# Patient Record
Sex: MALE | Race: WHITE | Employment: FULL TIME | URBAN - METROPOLITAN AREA
[De-identification: names, ages, dates, MRNs, and addresses within clinical notes are randomized per-mention and may not be internally consistent; named-entity substitution may affect disease eponyms.]

---

## 2019-11-25 ENCOUNTER — OFFICE VISIT (OUTPATIENT)
Dept: FAMILY MEDICINE | Facility: CLINIC | Age: 39
End: 2019-11-25
Payer: COMMERCIAL

## 2019-11-25 DIAGNOSIS — Z09 HOSPITAL DISCHARGE FOLLOW-UP: Primary | ICD-10-CM

## 2019-11-25 DIAGNOSIS — M25.522 LEFT ELBOW PAIN: ICD-10-CM

## 2019-11-25 DIAGNOSIS — E78.5 HYPERLIPIDEMIA LDL GOAL <100: ICD-10-CM

## 2019-11-25 DIAGNOSIS — L03.114 CELLULITIS OF LEFT UPPER EXTREMITY: ICD-10-CM

## 2019-11-25 DIAGNOSIS — E11.9 TYPE 2 DIABETES, HBA1C GOAL < 8% (H): ICD-10-CM

## 2019-11-25 PROCEDURE — 99203 OFFICE O/P NEW LOW 30 MIN: CPT | Performed by: FAMILY MEDICINE

## 2019-11-25 RX ORDER — SIMVASTATIN 10 MG
10 TABLET ORAL AT BEDTIME
Qty: 90 TABLET | Refills: 0 | Status: SHIPPED | OUTPATIENT
Start: 2019-11-25 | End: 2019-12-17

## 2019-11-25 RX ORDER — SYRINGE-NEEDLE,INSULIN,0.5 ML 27GX1/2"
SYRINGE, EMPTY DISPOSABLE MISCELLANEOUS
COMMUNITY
Start: 2019-11-20 | End: 2020-12-29

## 2019-11-25 RX ORDER — CEPHALEXIN 500 MG/1
1000 CAPSULE ORAL
COMMUNITY
Start: 2019-11-20 | End: 2019-12-17

## 2019-11-25 RX ORDER — LISINOPRIL 5 MG/1
5 TABLET ORAL DAILY
Qty: 90 TABLET | Refills: 1 | Status: SHIPPED | OUTPATIENT
Start: 2019-11-25 | End: 2019-12-17

## 2019-11-25 RX ORDER — FLUTICASONE PROPIONATE 50 MCG
1 SPRAY, SUSPENSION (ML) NASAL DAILY
COMMUNITY

## 2019-11-25 RX ORDER — INSULIN GLARGINE 100 [IU]/ML
INJECTION, SOLUTION SUBCUTANEOUS
COMMUNITY
Start: 2019-11-25 | End: 2019-12-17

## 2019-11-25 RX ORDER — INSULIN GLARGINE 100 [IU]/ML
30 INJECTION, SOLUTION SUBCUTANEOUS
COMMUNITY
Start: 2019-11-20 | End: 2019-11-25

## 2019-11-25 RX ORDER — LANCETS
EACH MISCELLANEOUS
COMMUNITY
Start: 2019-11-20 | End: 2019-12-17

## 2019-11-25 RX ORDER — BLOOD-GLUCOSE METER
EACH MISCELLANEOUS
COMMUNITY
Start: 2019-11-20 | End: 2022-07-19

## 2019-11-25 RX ORDER — INSULIN GLARGINE 100 [IU]/ML
INJECTION, SOLUTION SUBCUTANEOUS
COMMUNITY
Start: 2019-11-20 | End: 2019-11-25

## 2019-11-25 RX ORDER — FEXOFENADINE HCL 180 MG/1
180 TABLET ORAL DAILY
COMMUNITY

## 2019-11-25 RX ORDER — PEN NEEDLE, DIABETIC 32GX 5/32"
NEEDLE, DISPOSABLE MISCELLANEOUS
COMMUNITY
Start: 2019-11-20 | End: 2020-02-19

## 2019-11-25 RX ORDER — NAPROXEN 500 MG/1
500 TABLET ORAL 2 TIMES DAILY WITH MEALS
Qty: 60 TABLET | Refills: 0 | Status: SHIPPED | OUTPATIENT
Start: 2019-11-25 | End: 2020-03-16

## 2019-11-25 NOTE — PROGRESS NOTES
Subjective     Wilmer Rizvi is a 39 year old male who presents to clinic today for the following health issues:    HPI   Patient comes as a follow-up visit from the hospital.  He was admitted for left elbow cellulitis.  He was treated as in the hospital for 4 days.  He has been discharged on Keflex.  He reports the swelling the pain the redness has been a lot less.  He has no fever no chills.    Previous history diabetes patient was not taking medication.  During his hospital evaluation his hemoglobin A1c was at 14.6.  He been discharged home on metformin 500 mg twice daily, and and Basaglar 30 units at bedtime.  He reports blood sugar remained to be elevated between 180 and 200.    He is scheduled to follow-up with his primary care physician on December 17, 2019.    He denies any fever, has no chills.  He still have stiffness in his elbow.  He has been off work since that time    Hospital Follow-up Visit:    Hospital/Nursing Home/IP Rehab Facility: [facility]  Date of Admission: 11-  Date of Discharge: 11-  Reason(s) for Admission: Left hand infection            Problems taking medications regularly:  None       Medication changes since discharge: New medications on chart       Problems adhering to non-medication therapy:  None    Summary of hospitalization:  CareEverywhere information obtained and reviewed  Diagnostic Tests/Treatments reviewed.  Follow up needed: none  Other Healthcare Providers Involved in Patient s Care:         None  Update since discharge: improved.     Post Discharge Medication Reconciliation: discharge medications reconciled and changed, per note/orders (see AVS).  Plan of care communicated with patient     Coding guidelines for this visit:  Type of Medical   Decision Making Face-to-Face Visit       within 7 Days of discharge Face-to-Face Visit        within 14 days of discharge   Moderate Complexity [id number]   High Complexity [id number]          Current  "Outpatient Medications   Medication Sig Dispense Refill     aspirin 81 MG tablet Take 1 tablet (81 mg) by mouth daily 30 tablet 3     BD HERRERA U/F 32G X 4 MM insulin pen needle        blood glucose (NO BRAND SPECIFIED) test strip tid 100 strip 3     Blood Glucose Calibration (FREESTYLE CONTROL SOLUTION) LIQD 1 Bottle by Test Solution route as needed. 1 Bottle 0     blood glucose monitoring (ACCU-CHEK FASTCLIX) lancets        Blood Glucose Monitoring Suppl (ACCU-CHEK GUIDE) w/Device KIT        cephALEXin (KEFLEX) 500 MG capsule Take 1,000 mg by mouth       fexofenadine (ALLEGRA) 180 MG tablet Take 180 mg by mouth daily       fish oil-omega-3 fatty acids (FISH OIL) 1000 MG capsule Take 2 g by mouth daily Take one tablet twice daily       fluticasone (FLONASE) 50 MCG/ACT nasal spray Spray 1 spray into both nostrils daily       insulin glargine (BASAGLAR KWIKPEN) 100 UNIT/ML pen 30 units qhs       insulin syringe-needle U-100 (BD SAFETYGLIDE INSULIN SYRINGE) 29G X 1/2\" 0.5 ML miscellaneous For administering insulin at home.       lisinopril (PRINIVIL/ZESTRIL) 5 MG tablet Take 1 tablet (5 mg) by mouth daily 90 tablet 1     metFORMIN (GLUCOPHAGE) 1000 MG tablet Take 1 tablet (1,000 mg) by mouth 2 times daily (with meals) 180 tablet 3     naproxen (NAPROSYN) 500 MG tablet Take 1 tablet (500 mg) by mouth 2 times daily (with meals) 60 tablet 0     ORDER FOR DME Respironics REMSTAR 60 Series Auto CPAP 5-18cm H2O, Quattro Air medium FFM       ORDER FOR DME Lancets.  2 to 3 times daily and as needed 100 each 4     simvastatin (ZOCOR) 10 MG tablet Take 1 tablet (10 mg) by mouth At Bedtime 90 tablet 0     Allergies   Allergen Reactions     Amoxicillin Rash     Rash     Recent Labs   Lab Test 08/06/13  0939 07/19/13  1126 01/22/13  0831 01/22/13  0830 03/26/12  1025 03/12/12  0831 12/21/11  1508  12/16/11   A1C  --  7.0*  --  7.5* 7.2*  --  12.7*   < >  --    LDL  --   --  92  --   --  71 141*  --   --    HDL  --   --  25*  --   -- "  34* 25*  --   --    TRIG  --   --  159*  --   --  208* 258*  --   --    ALT  --   --   --   --   --  46  --   --  68   CR  --   --  0.90  --   --   --  0.94  --  0.90   GFRESTIMATED  --   --  >90  --   --   --  >90  --  > 60   GFRESTBLACK  --   --  >90  --   --   --  >90  --  > 60   POTASSIUM  --   --   --   --   --   --  3.8  --  3.8   TSH 1.97  --   --   --   --   --  3.36  --   --     < > = values in this interval not displayed.        Reviewed and updated as needed this visit by Provider         Review of Systems   ROS COMP: Constitutional, HEENT, cardiovascular, pulmonary, gi and gu systems are negative, except as otherwise noted.      Objective    There were no vitals taken for this visit.  There is no height or weight on file to calculate BMI.  Physical Exam   GENERAL: healthy, alert and no distress  NECK: no adenopathy, no asymmetry, masses, or scars and thyroid normal to palpation  RESP: lungs clear to auscultation - no rales, rhonchi or wheezes  CV: regular rate and rhythm, normal S1 S2, no S3 or S4, no murmur, click or rub, no peripheral edema and peripheral pulses strong  ABDOMEN: soft, nontender, no hepatosplenomegaly, no masses and bowel sounds normal  SKIN: left elbow redness, and mild tenderness  NEURO: Normal strength and tone, mentation intact and speech normal  Left elbow exam, he has minor tenderness, no discharges, minimal swelling, painful, stiffness with full extension flexion.    Diagnostic Test Results:  Labs reviewed in Epic  none         Assessment & Plan     1. Hospital discharge follow-up  2. Cellulitis of left upper extremity  Improving he is to continue with his current antibiotic Keflex 4 times a day.  In addition I given naproxen 500 mg twice daily with food for the next 2 weeks.  I did apply AC bandage.  In addition, he was advised to apply ice a few times a day and avoid overusing it.    In addition, he was given a note to remain off work until next Monday.    3. Type 2 diabetes,  HbA1C goal < 8% (H)  Remained to be elevated I increased his metformin to 1000 mg twice daily, continue with his current insulin.  Continue with Accu-Cheks 3 times a day.    He will follow-up with his primary care physician on December 17.    In addition, I added lisinopril.    - metFORMIN (GLUCOPHAGE) 1000 MG tablet; Take 1 tablet (1,000 mg) by mouth 2 times daily (with meals)  Dispense: 180 tablet; Refill: 3  - blood glucose (NO BRAND SPECIFIED) test strip; tid  Dispense: 100 strip; Refill: 3  - lisinopril (PRINIVIL/ZESTRIL) 5 MG tablet; Take 1 tablet (5 mg) by mouth daily  Dispense: 90 tablet; Refill: 1  - insulin glargine (BASAGLAR KWIKPEN) 100 UNIT/ML pen; 30 units qhs    4. Hyperlipidemia LDL goal <100  Started Simvastatin at 10 mg at bedtime.  - simvastatin (ZOCOR) 10 MG tablet; Take 1 tablet (10 mg) by mouth At Bedtime  Dispense: 90 tablet; Refill: 0    5. Left elbow pain    - naproxen (NAPROSYN) 500 MG tablet; Take 1 tablet (500 mg) by mouth 2 times daily (with meals)  Dispense: 60 tablet; Refill: 0       See Patient Instructions    Return in about 3 months (around 2/25/2020).    Lexis Olivo MD  [facility]

## 2019-11-25 NOTE — LETTER
November 25, 2019      Wilmer Rizvi  [address] [facility] [address]  [place] [address]        To Whom It May Concern:    Wilmer Rizvi  was seen on 11/25/2019.  Please excuse him  until 12/02/2019 due to illness.  Return to work on Monday, 12/02/2019        Sincerely,        Lexis Olivo MD

## 2019-11-25 NOTE — PATIENT INSTRUCTIONS
"  Patient Education   Diabetes ABCs  Work with Your Health Care Team to Manage Diabetes!     A1c (hemoglobin A1c test):  Check at least every 6 months.  Goal without diabetes: Less than 6%  Goal with diabetes: Less than 7%, but your doctor may have a different goal for you.  My Goal: ___________________   BG (blood glucose):  Goals without diabetes:  Before meals: 60 to 99 mg/dL  After meals (2 hours): under 140 mg/dL  Goals with diabetes:   Before meals: 80 to 130 mg/dL  After meals: (2 hours): under 180 mg/dL  My Goals:  Before meals: __________  After meals: ___________   Blood pressure:  Check at every doctor visit.   Goal: Less than 140/90    Cholesterol:   Check at least 1 time a year.  Goals for LDL (\"bad\" cholesterol):  With heart disease: Less than 70 mg/dL  Without heart disease: Less than 100 mg/dL   Eyes:   Have a dilated eye exam every year.   Teeth:   See your dentist twice a year. People with diabetes have a higher risk of gum disease.  Smoking:   If you smoke, it's important to quit. Make a plan with help from your health care team.  Weight:   Work towards a healthy weight with help from your diabetes educator or dietitian.  Kidneys:     Check your urine protein 1 time each year.    Protect your kidneys by keeping your blood pressure normal.  Feet:    Check your feet every day for signs of injury, dry skin, cracks, cuts, swelling, or blisters.    Ask your doctor to check your feet at every visit.    Wear shoes and socks that fit well.    Don't go barefoot.  Sex:   Talk about erectile dysfunction (ED) and female sexual dysfunction (FSD) with your doctor.  For informational purposes only. Not to replace the advice of your health care provider. Copyright   2018 5[employer]9. All rights reserved. Illustration ID [id number]   [id number]  [employer]. Clinically reviewed by [facility]. [employer] 726910 - 10/18.       Patient Education   Goals for Your Diabetes Care  A1c   Goal:  " Less than 7 percent--ask your doctor if this is right for you  Check it: Every 3 to 6 months  Why:  Shows how well your blood glucose was controlled over the past 3 months  Blood pressure   Goal: Under 140/90  Check it:  At every clinic check up for diabetes  Why:  May prevent stroke, heart disease and eye and kidney diseases  Cholesterol   Goal:  Discuss cholesterol management with your doctor, and consider taking a statin medicine  Check it:  Every year  Why:  Helps prevent heart attacks and stroke  Kidney test (urine microalbumin)  Goal:  Under 30  Do it:  Every year  Why:  Finds kidney problems before they get worse  Foot exam   Goal:  No cuts or sores, normal sensation  Do it: Remove shoes and socks at every visit; have a monofilament test every year  Why: Finds foot problems before they get worse  Eye exam   Goal:  No changes in your eyes  Do it: Every year  Why:  Finds eye problems before they get worse  Exercise  Goal:  150 minutes of aerobic exercises and 2 to 3 sessions of strength training  Do it: Every week  Why:  Helps manage diabetes and improve health  Aspirin  Goal:  If you are age 50 or older, ask your doctor if you should take aspirin  Take it: Every day, or as prescribed  Why: Lowers the risk of heart attack and stroke  Smoking and tobacco  Goal: No tobacco use  Why: Tobacco is a major risk for heart disease  Diabetes education  Goal: Learn how to manage your diabetes  Do it: At least once a year--ask your doctor for a referral  Why: The more you know, the better you can manage your diabetes  Your goals may be different from what you see here. Your care team will tell you what your goals should be.   For informational purposes only. Not to replace the advice of your health care provider.   Copyright   2009 [employer]. All rights reserved. [employer] [id number] - Rev 01/16.

## 2019-11-27 ENCOUNTER — MYC REFILL (OUTPATIENT)
Dept: FAMILY MEDICINE | Facility: CLINIC | Age: 39
End: 2019-11-27

## 2019-11-27 DIAGNOSIS — E11.9 TYPE 2 DIABETES, HBA1C GOAL < 8% (H): ICD-10-CM

## 2019-11-27 NOTE — TELEPHONE ENCOUNTER
Medication filled 11/25/19. Duplicate, refused. Patient notified via [email or web address] message.     Rakel Carr RN, BSN, PHN  [facility]: [place]

## 2019-12-17 ENCOUNTER — OFFICE VISIT (OUTPATIENT)
Dept: FAMILY MEDICINE | Facility: CLINIC | Age: 39
End: 2019-12-17
Payer: COMMERCIAL

## 2019-12-17 VITALS
BODY MASS INDEX: 31.38 KG/M2 | HEIGHT: 73 IN | DIASTOLIC BLOOD PRESSURE: 85 MMHG | SYSTOLIC BLOOD PRESSURE: 147 MMHG | HEART RATE: 101 BPM | TEMPERATURE: 98.4 F | WEIGHT: 236.8 LBS

## 2019-12-17 DIAGNOSIS — I10 HYPERTENSION GOAL BP (BLOOD PRESSURE) < 140/90: ICD-10-CM

## 2019-12-17 DIAGNOSIS — E78.5 HYPERLIPIDEMIA LDL GOAL <100: ICD-10-CM

## 2019-12-17 DIAGNOSIS — E11.9 TYPE 2 DIABETES, HBA1C GOAL < 8% (H): Primary | ICD-10-CM

## 2019-12-17 LAB — HBA1C MFR BLD: 10.5 % (ref 0–5.6)

## 2019-12-17 PROCEDURE — 83036 HEMOGLOBIN GLYCOSYLATED A1C: CPT | Performed by: FAMILY MEDICINE

## 2019-12-17 PROCEDURE — 99214 OFFICE O/P EST MOD 30 MIN: CPT | Performed by: FAMILY MEDICINE

## 2019-12-17 PROCEDURE — 82043 UR ALBUMIN QUANTITATIVE: CPT | Performed by: FAMILY MEDICINE

## 2019-12-17 PROCEDURE — 36415 COLL VENOUS BLD VENIPUNCTURE: CPT | Performed by: FAMILY MEDICINE

## 2019-12-17 RX ORDER — INSULIN GLARGINE 100 [IU]/ML
INJECTION, SOLUTION SUBCUTANEOUS
Qty: 45 ML | Refills: 1 | Status: SHIPPED | OUTPATIENT
Start: 2019-12-17 | End: 2020-03-16

## 2019-12-17 RX ORDER — LANCETS
EACH MISCELLANEOUS
Qty: 102 EACH | Refills: 3 | Status: SHIPPED | OUTPATIENT
Start: 2019-12-17 | End: 2022-07-19

## 2019-12-17 RX ORDER — LISINOPRIL 10 MG/1
10 TABLET ORAL DAILY
Qty: 30 TABLET | Refills: 1 | Status: SHIPPED | OUTPATIENT
Start: 2019-12-17 | End: 2020-03-16

## 2019-12-17 RX ORDER — SIMVASTATIN 10 MG
10 TABLET ORAL AT BEDTIME
Qty: 90 TABLET | Refills: 0 | Status: SHIPPED | OUTPATIENT
Start: 2019-12-17 | End: 2020-03-16

## 2019-12-17 ASSESSMENT — MIFFLIN-ST. JEOR: SCORE: 2041

## 2019-12-18 NOTE — PATIENT INSTRUCTIONS
BLOOD PRESSURE:  increase lisinopril from 5mg to 10mg    DIABETES:  Increase your lantus by 3 units every 3 days until your fasting blood sugar is less than 140.      Update me with blood sugar readings via [employer]    Return in 2 months      A1c Chart     6-----135 average blood sugar   7-----170  8-----205  9-----240  10----275  11----310  12----345

## 2019-12-18 NOTE — PROGRESS NOTES
Subjective     Wilmer Rizvi is a 39 year old male who presents to clinic today for the following health issues:    HPI   Diabetes Follow-up    How often are you checking your blood sugar? Four or more times daily  Blood sugar testing frequency justification:  Uncontrolled diabetes  What time of day are you checking your blood sugars (select all that apply)?  Before and after meals and At bedtime  Have you had any blood sugars above 200?  No  Have you had any blood sugars below 70?  No    What symptoms do you notice when your blood sugar is low?  None    What concerns do you have today about your diabetes? Other: Long term prognosis      Do you have any of these symptoms? (Select all that apply)  No numbness or tingling in feet.  No redness, sores or blisters on feet.  No complaints of excessive thirst.  No reports of blurry vision.  No significant changes to weight.     Have you had a diabetic eye exam in the last 12 months? Yes- Date of last eye exam: 04/2019 at [facility]    Patients presents with uncontrolled diabetes. For the past 6 years he has not managed his diabetes. Currently he is taking Metformin 1000mg BID and insulin glargine 30 units. He notes that his blood sugar spikes in the morning. His blood sugars are averaging 170-210. After being discharged from the hospital his blood sugars were in the 260s.    Diabetes Management Resources    Hyperlipidemia Follow-Up      Are you regularly taking any medication or supplement to lower your cholesterol?   Yes- simvastatin    Are you having muscle aches or other side effects that you think could be caused by your cholesterol lowering medication?  No    Hypertension Follow-up      Do you check your blood pressure regularly outside of the clinic? Yes     Are you following a low salt diet? Yes    Are your blood pressures ever more than 140 on the top number (systolic) OR more   than 90 on the bottom number (diastolic), for example 140/90? No  Managed by lisinopril 5mg.  "Patient reports his blood pressure readings are within normal limits at home.     BP Readings from Last 2 Encounters:   12/17/19 (!) 147/85   11/05/13 136/85     Hemoglobin A1C (%)   Date Value   12/17/2019 10.5 (H)   07/19/2013 7.0 (H)     LDL Cholesterol Calculated (mg/dL)   Date Value   01/22/2013 92   03/12/2012 71         How many servings of fruits and vegetables do you eat daily?  2-3    On average, how many sweetened beverages do you drink each day (Examples: soda, juice, sweet tea, etc.  Do NOT count diet or artificially sweetened beverages)?   0    How many days per week do you miss taking your medication? 0    BP Readings from Last 3 Encounters:   12/17/19 (!) 147/85   11/05/13 136/85   09/24/13 141/89    Wt Readings from Last 3 Encounters:   12/17/19 107.4 kg (236 lb 12.8 oz)   11/05/13 120.5 kg (265 lb 9.6 oz)   09/24/13 119.1 kg (262 lb 9.6 oz)        Reviewed and updated as needed this visit by Provider  Allergies  Meds  Problems  Med Hx  Surg Hx       Review of Systems   ROS COMP: Constitutional, HEENT, cardiovascular, pulmonary, gi and gu systems are negative, except as otherwise noted.    This document serves as a record of the services and decisions personally performed and made by Tammy Guevara DO. It was created on her behalf by Pham Hadley, a trained medical scribe. The creation of this document is based on the provider's statements to the medical scribe.  Pham Hadley 6:40 PM December 17, 2019      Objective    BP (!) 147/85 (BP Location: Left arm, Patient Position: Chair, Cuff Size: Adult Large)   Pulse 101   Temp 98.4  F (36.9  C) (Oral)   Ht 1.859 m (6' 1.19\")   Wt 107.4 kg (236 lb 12.8 oz)   BMI 31.08 kg/m    Body mass index is 31.08 kg/m .     Physical Exam   GENERAL: healthy, alert and no distress  HENT: mouth without ulcers or lesions  NECK: no adenopathy, no asymmetry, masses, or scars and thyroid normal to palpation  RESP: lungs clear to auscultation - no rales, " rhonchi or wheezes  CV: regular rate and rhythm, normal S1 S2, no S3 or S4, no murmur, click or rub, no peripheral edema and peripheral pulses strong  MS: no gross musculoskeletal defects noted, no edema  SKIN: no suspicious lesions or rashes to visible skin   NEURO: Normal strength and tone, mentation intact and speech normal  PSYCH: mentation appears normal, affect normal/bright        Assessment & Plan       ICD-10-CM    1. Type 2 diabetes, HbA1C goal < 8% (H) E11.9 HEMOGLOBIN A1C     Albumin Random Urine Quantitative with Creat Ratio     OPHTHALMOLOGY ADULT REFERRAL     insulin glargine (BASAGLAR KWIKPEN) 100 UNIT/ML pen     blood glucose monitoring (ACCU-CHEK FASTCLIX) lancets     blood glucose (NO BRAND SPECIFIED) test strip   2. Hyperlipidemia LDL goal <100 E78.5 simvastatin (ZOCOR) 10 MG tablet   3. Hypertension goal BP (blood pressure) < 140/90 I10 lisinopril (PRINIVIL/ZESTRIL) 10 MG tablet     Patient presents with uncontrolled diabetes. He has been instructed to continue his diabetes medication and increase his lantus by 3 units every 3 days until his fasting blood sugar is below 140. He should return in 2 months for a diabetes recheck.     Since patients blood pressure was elevated today I have increased his lisinopril from 5mg to 10mg. He should return in 2 months for a blood pressure recheck.     The current medical regimen for hyperlipidemia is effective;  continue present plan and medications.        Patient Instructions   BLOOD PRESSURE:  increase lisinopril from 5mg to 10mg    DIABETES:  Increase your lantus by 3 units every 3 days until your fasting blood sugar is less than 140.      Update me with blood sugar readings via [employer]    Return in 2 months      A1c Chart     6-----135 average blood sugar   7-----170  8-----205  9-----240  10----275  11----310  12----345         Return in about 2 months (around 2/17/2020) for BP Recheck, diabetes.     The information in this document, created by the  medical scribePham, for me, accurately reflects the services I personally performed and the decisions made by me. I have reviewed and approved this document for accuracy.  December 17, 2019 6:55 PM    Tammy Guevara DO  [facility]

## 2019-12-19 LAB
CREAT UR-MCNC: 91 MG/DL
MICROALBUMIN UR-MCNC: 44 MG/L
MICROALBUMIN/CREAT UR: 47.91 MG/G CR (ref 0–17)

## 2020-02-12 ENCOUNTER — MYC MEDICAL ADVICE (OUTPATIENT)
Dept: FAMILY MEDICINE | Facility: CLINIC | Age: 40
End: 2020-02-12

## 2020-02-12 NOTE — TELEPHONE ENCOUNTER
[employer] message sent to patient.  Dr. Guevara, please review his blood sugar results that he sent in his [employer] message.    Thank you,    Danilo Narvaez RN

## 2020-02-18 ENCOUNTER — TELEPHONE (OUTPATIENT)
Dept: FAMILY MEDICINE | Facility: CLINIC | Age: 40
End: 2020-02-18

## 2020-02-18 DIAGNOSIS — E11.9 TYPE 2 DIABETES, HBA1C GOAL < 8% (H): Primary | ICD-10-CM

## 2020-02-18 NOTE — TELEPHONE ENCOUNTER
"Requested Prescriptions   Pending Prescriptions Disp Refills     BD HERRERA U/F 32G X 4 MM insulin pen needle  Medication is historical  Last Written Prescription Date:  11/20/2019  Last Fill Quantity: ,  # refills:    Last office visit: 12/17/2019 with prescribing provider:  Ismael   Future Office Visit:   Next 5 appointments (look out 90 days)    Feb 24, 2020  8:40 AM CST  SHORT with Tammy Guevara DO  [facility] ([address] [id number] [phone]                Diabetic Supplies Protocol Passed - 2/18/2020  8:44 AM        Passed - Medication is active on med list        Passed - Patient is 18 years of age or older        Passed - Recent (6 mo) or future (30 days) visit within the authorizing provider's specialty     Patient had office visit in the last 6 months or has a visit in the next 30 days with authorizing provider.  See \"Patient Info\" tab in inbasket, or \"Choose Columns\" in Meds & Orders section of the refill encounter.             "

## 2020-02-18 NOTE — TELEPHONE ENCOUNTER
Left patient semi detailed message that alternative medication was requested by his insurance.  This medication has been sent to the pharmacy.  His Pharmacy will call when ready to .    Rylee Ramirez RN

## 2020-02-18 NOTE — TELEPHONE ENCOUNTER
Alternative requested: Re: insulin glargine (BASAGLAR KWIKPEN) 100 UNIT/ML pen    Pharmacy note: Alternative requested, please send new RX for Lantus

## 2020-02-18 NOTE — TELEPHONE ENCOUNTER
"Routing request for insulin alternative to providers in PCP absence.  New order for Lantus pended for approval.  Patient last saw PCP 12/17/19 for DMII-noted pasted below, and patient does have upcoming appt scheduled for 2/24/20.    Kenan James RN    \"Patient presents with uncontrolled diabetes. He has been instructed to continue his diabetes medication and increase his lantus by 3 units every 3 days until his fasting blood sugar is below 140. He should return in 2 months for a diabetes recheck.\"     "

## 2020-02-19 RX ORDER — PEN NEEDLE, DIABETIC 32GX 5/32"
NEEDLE, DISPOSABLE MISCELLANEOUS
Qty: 100 EACH | Refills: 3 | Status: SHIPPED | OUTPATIENT
Start: 2020-02-19 | End: 2020-12-29

## 2020-02-19 NOTE — TELEPHONE ENCOUNTER
Routing refill request to provider for review/approval because:  Medication is reported/historical    Rakel Carr RN, BSN, PHN  [facility] [place]

## 2020-03-02 ENCOUNTER — HEALTH MAINTENANCE LETTER (OUTPATIENT)
Age: 40
End: 2020-03-02

## 2020-03-16 ENCOUNTER — OFFICE VISIT (OUTPATIENT)
Dept: FAMILY MEDICINE | Facility: CLINIC | Age: 40
End: 2020-03-16
Payer: COMMERCIAL

## 2020-03-16 VITALS
TEMPERATURE: 97.8 F | SYSTOLIC BLOOD PRESSURE: 162 MMHG | WEIGHT: 240 LBS | DIASTOLIC BLOOD PRESSURE: 80 MMHG | HEART RATE: 103 BPM | OXYGEN SATURATION: 99 % | BODY MASS INDEX: 31.81 KG/M2 | HEIGHT: 73 IN

## 2020-03-16 DIAGNOSIS — E11.9 TYPE 2 DIABETES, HBA1C GOAL < 8% (H): Primary | ICD-10-CM

## 2020-03-16 DIAGNOSIS — I10 ESSENTIAL HYPERTENSION: ICD-10-CM

## 2020-03-16 DIAGNOSIS — E78.5 HYPERLIPIDEMIA LDL GOAL <100: ICD-10-CM

## 2020-03-16 DIAGNOSIS — Z11.4 SCREENING FOR HIV (HUMAN IMMUNODEFICIENCY VIRUS): ICD-10-CM

## 2020-03-16 LAB — HBA1C MFR BLD: 7.4 % (ref 0–5.6)

## 2020-03-16 PROCEDURE — 87389 HIV-1 AG W/HIV-1&-2 AB AG IA: CPT | Performed by: FAMILY MEDICINE

## 2020-03-16 PROCEDURE — 80061 LIPID PANEL: CPT | Performed by: FAMILY MEDICINE

## 2020-03-16 PROCEDURE — 36415 COLL VENOUS BLD VENIPUNCTURE: CPT | Performed by: FAMILY MEDICINE

## 2020-03-16 PROCEDURE — 90471 IMMUNIZATION ADMIN: CPT | Performed by: FAMILY MEDICINE

## 2020-03-16 PROCEDURE — 83036 HEMOGLOBIN GLYCOSYLATED A1C: CPT | Performed by: FAMILY MEDICINE

## 2020-03-16 PROCEDURE — 99214 OFFICE O/P EST MOD 30 MIN: CPT | Mod: 25 | Performed by: FAMILY MEDICINE

## 2020-03-16 PROCEDURE — 90732 PPSV23 VACC 2 YRS+ SUBQ/IM: CPT | Performed by: FAMILY MEDICINE

## 2020-03-16 PROCEDURE — 80048 BASIC METABOLIC PNL TOTAL CA: CPT | Performed by: FAMILY MEDICINE

## 2020-03-16 RX ORDER — INSULIN GLARGINE 100 [IU]/ML
50 INJECTION, SOLUTION SUBCUTANEOUS DAILY
Qty: 45 ML | Refills: 1 | Status: SHIPPED | OUTPATIENT
Start: 2020-03-16 | End: 2020-11-27

## 2020-03-16 RX ORDER — SIMVASTATIN 10 MG
10 TABLET ORAL AT BEDTIME
Qty: 90 TABLET | Refills: 3 | Status: SHIPPED | OUTPATIENT
Start: 2020-03-16 | End: 2021-05-18

## 2020-03-16 RX ORDER — AMLODIPINE BESYLATE 5 MG/1
5 TABLET ORAL DAILY
Qty: 30 TABLET | Refills: 1 | Status: SHIPPED | OUTPATIENT
Start: 2020-03-16 | End: 2020-04-05

## 2020-03-16 ASSESSMENT — MIFFLIN-ST. JEOR: SCORE: 2056.47

## 2020-03-16 NOTE — NURSING NOTE
Prior to immunization administration, verified patients identity using patient s name and date of birth. Please see Immunization Activity for additional information.     Screening Questionnaire for Adult Immunization    Are you sick today?   No   Do you have allergies to medications, food, a vaccine component or latex?   No   Have you ever had a serious reaction after receiving a vaccination?   No   Do you have a long-term health problem with heart, lung, kidney, or metabolic disease (e.g., diabetes), asthma, a blood disorder, no spleen, complement component deficiency, a cochlear implant, or a spinal fluid leak?  Are you on long-term aspirin therapy?   Yes   Do you have cancer, leukemia, HIV/AIDS, or any other immune system problem?   No   Do you have a parent, brother, or sister with an immune system problem?   No   In the past 3 months, have you taken medications that affect  your immune system, such as prednisone, other steroids, or anticancer drugs; drugs for the treatment of rheumatoid arthritis, Crohn s disease, or psoriasis; or have you had radiation treatments?   No   Have you had a seizure, or a brain or other nervous system problem?   No   During the past year, have you received a transfusion of blood or blood    products, or been given immune (gamma) globulin or antiviral drug?   No   For women: Are you pregnant or is there a chance you could become       pregnant during the next month?   No   Have you received any vaccinations in the past 4 weeks?   No     Immunization questionnaire was positive for at least one answer.  Notified Dr. Guevara.        Per orders of Dr. Guevara, injection of Pneumovax 23 given by Shaan Lindsay CMA. Patient instructed to remain in clinic for 15 minutes afterwards, and to report any adverse reaction to me immediately.       Screening performed by Shaan Lindsay CMA on 3/16/2020 at 10:03 AM.

## 2020-03-16 NOTE — PROGRESS NOTES
Subjective     Wilmer Rizvi is a 39 year old male who presents to clinic today for the following health issues:    HPI     Diabetes Follow-up    How often are you checking your blood sugar? Two times daily  Blood sugar testing frequency justification:  Patient modifying lifestyle changes (diet, exercise) with blood sugars  What time of day are you checking your blood sugars (select all that apply)?  Before and after meals  Have you had any blood sugars above 200?  Yes last 2 days  Have you had any blood sugars below 70?  No    What symptoms do you notice when your blood sugar is low?  None    What concerns do you have today about your diabetes? None     Do you have any of these symptoms? (Select all that apply)  No numbness or tingling in feet.  No redness, sores or blisters on feet.  No complaints of excessive thirst.  No reports of blurry vision.  No significant changes to weight.    Have you had a diabetic eye exam in the last 12 months? Has one scheduled for next week     Takes 50 units of Basaglar every night. Has found that blood sugars are controlled when he sticks to a specific time frame with eating and taking medication.     Hyperlipidemia Follow-Up      Are you regularly taking any medication or supplement to lower your cholesterol?   Yes- Simvastatin     Are you having muscle aches or other side effects that you think could be caused by your cholesterol lowering medication?  No     Forgets Simvastatin every once in a while.     Hypertension Follow-up      Do you check your blood pressure regularly outside of the clinic? No     Are you following a low salt diet? No    Are your blood pressures ever more than 140 on the top number (systolic) OR more   than 90 on the bottom number (diastolic), for example 140/90?     Believes high stress at work may have contributed to elevated blood pressure in clinic today. Has a blood pressure cuff at home but hasn't been using it for the last 1.5 months.     BP Readings  "from Last 2 Encounters:   03/16/20 (!) 162/80   12/17/19 (!) 147/85     Hemoglobin A1C (%)   Date Value   03/16/2020 7.4 (H)   12/17/2019 10.5 (H)     LDL Cholesterol Calculated (mg/dL)   Date Value   01/22/2013 92   03/12/2012 71       How many servings of fruits and vegetables do you eat daily?  2-3    On average, how many sweetened beverages do you drink each day (Examples: soda, juice, sweet tea, etc.  Do NOT count diet or artificially sweetened beverages)?   0    How many days per week do you exercise enough to make your heart beat faster? 7    How many minutes a day do you exercise enough to make your heart beat faster? 60 or more    How many days per week do you miss taking your medication? 0    BP Readings from Last 3 Encounters:   03/16/20 (!) 162/80   12/17/19 (!) 147/85   11/05/13 136/85    Wt Readings from Last 3 Encounters:   03/16/20 108.9 kg (240 lb)   12/17/19 107.4 kg (236 lb 12.8 oz)   11/05/13 120.5 kg (265 lb 9.6 oz)         Reviewed and updated as needed this visit by Provider  Allergies  Meds  Problems  Med Hx  Surg Hx       Review of Systems   ROS COMP: Constitutional, HEENT, cardiovascular, pulmonary, gi and gu systems are negative, except as otherwise noted.    This document serves as a record of the services and decisions personally performed and made by Tammy Guevara DO. It was created on her behalf by Teri Salamanca, a trained medical scribe. The creation of this document is based on the provider's statements to the medical scribe.  Teri Salamanca 9:46 AM March 16, 2020      Objective    BP (!) 162/80   Pulse 103   Temp 97.8  F (36.6  C) (Oral)   Ht 1.861 m (6' 1.25\")   Wt 108.9 kg (240 lb)   SpO2 99%   BMI 31.45 kg/m    Body mass index is 31.45 kg/m .     Physical Exam   GENERAL: healthy, alert and no distress  HENT: ear canals and TM's normal, nose and mouth without ulcers or lesions  NECK: no adenopathy, no asymmetry, masses, or scars and thyroid normal to palpation  RESP: lungs " clear to auscultation - no rales, rhonchi or wheezes  CV: regular rate and rhythm, normal S1 S2, no S3 or S4, no murmur, click or rub, no peripheral edema  PSYCH: mentation appears normal, affect normal/bright    Diagnostic Test Results:  Labs reviewed in Epic  Results for orders placed or performed in visit on 03/16/20 (from the past 24 hour(s))   Hemoglobin A1c   Result Value Ref Range    Hemoglobin A1C 7.4 (H) 0 - 5.6 %           Assessment & Plan       ICD-10-CM    1. Type 2 diabetes, HbA1C goal < 8% (H)  E11.9 BASIC METABOLIC PANEL     Hemoglobin A1c     insulin glargine (BASAGLAR KWIKPEN) 100 UNIT/ML pen   2. Hyperlipidemia LDL goal <100  E78.5 Lipid panel reflex to direct LDL Fasting     simvastatin (ZOCOR) 10 MG tablet   3. Essential hypertension  I10 BASIC METABOLIC PANEL     amLODIPine (NORVASC) 5 MG tablet   4. Screening for HIV (human immunodeficiency virus)  Z11.4 HIV Screening   Diabetes is well controlled, continue current medication. Patient will start amlodipine 5 mg today for hypertension and stop lisinopril. He will start monitoring blood pressures at home and increase amlodipine to 10 mg if not well controlled.         Return in about 3 months (around 6/16/2020) for Lab Work, Routine Visit.    The information in this document, created by the medical scribe for me, accurately reflects the services I personally performed and the decisions made by me. I have reviewed and approved this document for accuracy prior to leaving the patient care area.  March 16, 2020 9:55 AM    Tammy Guevara DO  [facility]

## 2020-03-17 LAB
ANION GAP SERPL CALCULATED.3IONS-SCNC: 11 MMOL/L (ref 3–14)
BUN SERPL-MCNC: 18 MG/DL (ref 7–30)
CALCIUM SERPL-MCNC: 9.1 MG/DL (ref 8.5–10.1)
CHLORIDE SERPL-SCNC: 105 MMOL/L (ref 94–109)
CHOLEST SERPL-MCNC: 200 MG/DL
CO2 SERPL-SCNC: 22 MMOL/L (ref 20–32)
CREAT SERPL-MCNC: 0.86 MG/DL (ref 0.66–1.25)
GFR SERPL CREATININE-BSD FRML MDRD: >90 ML/MIN/{1.73_M2}
GLUCOSE SERPL-MCNC: 248 MG/DL (ref 70–99)
HDLC SERPL-MCNC: 33 MG/DL
HIV 1+2 AB+HIV1 P24 AG SERPL QL IA: NONREACTIVE
LDLC SERPL CALC-MCNC: 123 MG/DL
NONHDLC SERPL-MCNC: 167 MG/DL
POTASSIUM SERPL-SCNC: 3.8 MMOL/L (ref 3.4–5.3)
SODIUM SERPL-SCNC: 138 MMOL/L (ref 133–144)
TRIGL SERPL-MCNC: 218 MG/DL

## 2020-04-01 ENCOUNTER — MYC MEDICAL ADVICE (OUTPATIENT)
Dept: FAMILY MEDICINE | Facility: CLINIC | Age: 40
End: 2020-04-01

## 2020-04-01 DIAGNOSIS — I10 ESSENTIAL HYPERTENSION: ICD-10-CM

## 2020-04-05 RX ORDER — AMLODIPINE BESYLATE 5 MG/1
5 TABLET ORAL DAILY
Qty: 90 TABLET | Refills: 1 | Status: SHIPPED | OUTPATIENT
Start: 2020-04-05 | End: 2020-11-10

## 2020-07-06 ENCOUNTER — MYC MEDICAL ADVICE (OUTPATIENT)
Dept: FAMILY MEDICINE | Facility: CLINIC | Age: 40
End: 2020-07-06

## 2020-07-07 NOTE — TELEPHONE ENCOUNTER
Forms received from Insulin Treated Diabetes Mellitus Report for Tammy Guevara DO.  Forms placed in provider 'sign me' folder.  Please mail forms to  and [employer] after completion.    Yandel Aldridge

## 2020-07-07 NOTE — TELEPHONE ENCOUNTER
Patient attached a picture of his form, which does not print well. [employer] message sent asking patient if he is able to re-send this form.    Yandel Aldridge

## 2020-07-10 NOTE — TELEPHONE ENCOUNTER
Form received back, provider is requesting an appointment to follow up on patient's blood pressure before she feels comfortable clearing patient to drive.    Left message on machine, and mychart message sent requesting for patient to set up an office visit.    Form placed in TC holding forms folder by Angela's work station.    Yandel Aldridge

## 2020-07-13 ENCOUNTER — TELEPHONE (OUTPATIENT)
Dept: FAMILY MEDICINE | Facility: CLINIC | Age: 40
End: 2020-07-13

## 2020-07-13 NOTE — TELEPHONE ENCOUNTER
Reason for Call:  Other     Detailed comments: Patient needs to schedule sooner then August to complete paperwork.    Phone Number Patient can be reached at: Home number on file [phone] (home)    Best Time:     Can we leave a detailed message on this number? YES    Call taken on 7/13/2020 at 12:56 PM by Mary Ann Mohamud

## 2020-07-14 NOTE — TELEPHONE ENCOUNTER
Patient has office visit coming up on 07/27 with PCP regarding his From's for the DMV. He is wondering if a virtual visit would be okay regarding this. Sending to provider to Advise.  He did Fax and Mychart form's to us.

## 2020-07-14 NOTE — TELEPHONE ENCOUNTER
He needs to be seen in the clinic okay to add him onto my day on Thursday may be at the end of the day or if we have MA coverage    Tammy Guevara D.O.

## 2020-07-16 ENCOUNTER — OFFICE VISIT (OUTPATIENT)
Dept: FAMILY MEDICINE | Facility: CLINIC | Age: 40
End: 2020-07-16
Payer: COMMERCIAL

## 2020-07-16 VITALS — RESPIRATION RATE: 20 BRPM | SYSTOLIC BLOOD PRESSURE: 151 MMHG | DIASTOLIC BLOOD PRESSURE: 81 MMHG | HEART RATE: 79 BPM

## 2020-07-16 DIAGNOSIS — I10 HYPERTENSION GOAL BP (BLOOD PRESSURE) < 140/90: ICD-10-CM

## 2020-07-16 DIAGNOSIS — E11.9 TYPE 2 DIABETES, HBA1C GOAL < 8% (H): Primary | ICD-10-CM

## 2020-07-16 DIAGNOSIS — N52.9 ERECTILE DYSFUNCTION, UNSPECIFIED ERECTILE DYSFUNCTION TYPE: ICD-10-CM

## 2020-07-16 LAB
ANION GAP SERPL CALCULATED.3IONS-SCNC: 8 MMOL/L (ref 3–14)
BUN SERPL-MCNC: 14 MG/DL (ref 7–30)
CALCIUM SERPL-MCNC: 9.6 MG/DL (ref 8.5–10.1)
CHLORIDE SERPL-SCNC: 107 MMOL/L (ref 94–109)
CO2 SERPL-SCNC: 24 MMOL/L (ref 20–32)
CREAT SERPL-MCNC: 0.92 MG/DL (ref 0.66–1.25)
GFR SERPL CREATININE-BSD FRML MDRD: >90 ML/MIN/{1.73_M2}
GLUCOSE SERPL-MCNC: 229 MG/DL (ref 70–99)
HBA1C MFR BLD: 8.9 % (ref 0–5.6)
POTASSIUM SERPL-SCNC: 4.6 MMOL/L (ref 3.4–5.3)
SODIUM SERPL-SCNC: 139 MMOL/L (ref 133–144)

## 2020-07-16 PROCEDURE — 99214 OFFICE O/P EST MOD 30 MIN: CPT | Performed by: FAMILY MEDICINE

## 2020-07-16 PROCEDURE — 83036 HEMOGLOBIN GLYCOSYLATED A1C: CPT | Performed by: FAMILY MEDICINE

## 2020-07-16 PROCEDURE — 36415 COLL VENOUS BLD VENIPUNCTURE: CPT | Performed by: FAMILY MEDICINE

## 2020-07-16 PROCEDURE — 80048 BASIC METABOLIC PNL TOTAL CA: CPT | Performed by: FAMILY MEDICINE

## 2020-07-16 RX ORDER — AMLODIPINE BESYLATE 10 MG/1
10 TABLET ORAL DAILY
Qty: 30 TABLET | Refills: 1 | Status: SHIPPED | OUTPATIENT
Start: 2020-07-16 | End: 2020-10-16

## 2020-07-16 RX ORDER — SILDENAFIL 100 MG/1
50-100 TABLET, FILM COATED ORAL DAILY PRN
Qty: 10 TABLET | Refills: 1 | Status: SHIPPED | OUTPATIENT
Start: 2020-07-16 | End: 2022-03-04

## 2020-07-16 NOTE — PROGRESS NOTES
Subjective     Wilmer Rizvi is a 40 year old male who presents to clinic today for the following health issues:    HPI       For for DMV regarding DM,       Hypertension Follow-up      Do you check your blood pressure regularly outside of the clinic? Yes     Are you following a low salt diet? No    Are your blood pressures ever more than 140 on the top number (systolic) OR more   than 90 on the bottom number (diastolic), for example 140/90? Yes   120-130/70-80 at home.  He is checking daily or a few times a week    He is doing well with his norvasc       And Other Concerns   163/83  Diabetes Follow-up    How often are you checking your blood sugar? Three times daily  Blood sugar testing frequency justification:  Uncontrolled diabetes  What time of day are you checking your blood sugars (select all that apply)?  Before and after meals, fasting 156, 161, 200  Have you had any blood sugars above 200?  No, only 1   Have you had any blood sugars below 70?  No    What symptoms do you notice when your blood sugar is low?  None    What concerns do you have today about your diabetes? None     Do you have any of these symptoms? (Select all that apply)  No numbness or tingling in feet.  No redness, sores or blisters on feet.  No complaints of excessive thirst.  No reports of blurry vision.  No significant changes to weight.    Have you had a diabetic eye exam in the last 12 months? No, was cancelled   Lab Results   Component Value Date    A1C 8.9 07/16/2020    A1C 7.4 03/16/2020    A1C 10.5 12/17/2019    A1C 7.0 07/19/2013    A1C 7.5 01/22/2013         Metformin 1000 mg twice daily, Basaglar 50 units daily       BP Readings from Last 2 Encounters:   07/16/20 (!) 151/81   03/16/20 (!) 162/80     Hemoglobin A1C (%)   Date Value   07/16/2020 8.9 (H)   03/16/2020 7.4 (H)     LDL Cholesterol Calculated (mg/dL)   Date Value   03/16/2020 123 (H)   01/22/2013 92                 BP Readings from Last 3 Encounters:   07/16/20 (!) 151/81    03/16/20 (!) 162/80   12/17/19 (!) 147/85    Wt Readings from Last 3 Encounters:   03/16/20 108.9 kg (240 lb)   12/17/19 107.4 kg (236 lb 12.8 oz)   11/05/13 120.5 kg (265 lb 9.6 oz)           Reviewed and updated as needed this visit by Provider  Tobacco  Allergies  Meds  Problems  Med Hx  Surg Hx  Fam Hx         Review of Systems   Constitutional, HEENT, cardiovascular, pulmonary, GI, , musculoskeletal, neuro, skin, endocrine and psych systems are negative, except as otherwise noted.      Objective    BP (!) 151/81   Pulse 79   Resp 20   There is no height or weight on file to calculate BMI.  Physical Exam   GENERAL: healthy, alert and no distress  NECK: no adenopathy, no asymmetry, masses, or scars and thyroid normal to palpation  RESP: lungs clear to auscultation - no rales, rhonchi or wheezes  CV: regular rate and rhythm, normal S1 S2, no S3 or S4, no murmur, click or rub, no peripheral edema and peripheral pulses strong  MS: no gross musculoskeletal defects noted, no edema  SKIN: no suspicious lesions or rashes  PSYCH: mentation appears normal, affect normal/bright    Diagnostic Test Results:  Labs reviewed in Epic  Results for orders placed or performed in visit on 07/16/20 (from the past 24 hour(s))   Hemoglobin A1c   Result Value Ref Range    Hemoglobin A1C 8.9 (H) 0 - 5.6 %           Assessment & Plan     (E11.9) Type 2 diabetes, HbA1C goal < 8% (H)  (primary encounter diagnosis)  Comment: The patient does not want to adjust his medication at this time.  He feels his elevated A1c is due to poor food choices during the early days of the pandemic.  Plan: Hemoglobin A1c            (I10) Hypertension goal BP (blood pressure) < 140/90  Comment: We will increase his Norvasc from 5 mg daily to 10 mg daily and recheck in 2 months  Plan: Basic metabolic panel  (Ca, Cl, CO2, Creat,         Gluc, K, Na, BUN), amLODIPine (NORVASC) 10 MG         tablet            (N52.9) Erectile dysfunction, unspecified  "erectile dysfunction type  Comment:   Plan: sildenafil (VIAGRA) 100 MG tablet               BMI:   Estimated body mass index is 31.45 kg/m  as calculated from the following:    Height as of 3/16/20: 1.861 m (6' 1.25\").    Weight as of 3/16/20: 108.9 kg (240 lb).       Return in about 3 months (around 10/16/2020) for BP Recheck, diabetes.    Tammy Guevara DO  [facility]  "

## 2020-07-16 NOTE — PATIENT INSTRUCTIONS
Lab Results   Component Value Date    A1C 8.9 07/16/2020    A1C 7.4 03/16/2020    A1C 10.5 12/17/2019    A1C 7.0 07/19/2013    A1C 7.5 01/22/2013

## 2020-10-15 DIAGNOSIS — I10 HYPERTENSION GOAL BP (BLOOD PRESSURE) < 140/90: ICD-10-CM

## 2020-10-16 RX ORDER — AMLODIPINE BESYLATE 10 MG/1
10 TABLET ORAL DAILY
Qty: 30 TABLET | Refills: 0 | Status: SHIPPED | OUTPATIENT
Start: 2020-10-16 | End: 2020-11-10

## 2020-10-16 NOTE — TELEPHONE ENCOUNTER
Routing refill request to provider for review/approval because:  Labs out of range:  bp  BP Readings from Last 3 Encounters:   07/16/20 (!) 151/81   03/16/20 (!) 162/80   12/17/19 (!) 147/85     Jackson Garcia RN, BSN

## 2020-11-10 DIAGNOSIS — I10 HYPERTENSION GOAL BP (BLOOD PRESSURE) < 140/90: ICD-10-CM

## 2020-11-10 RX ORDER — AMLODIPINE BESYLATE 10 MG/1
TABLET ORAL
Qty: 30 TABLET | Refills: 0 | Status: SHIPPED | OUTPATIENT
Start: 2020-11-10 | End: 2020-11-27

## 2020-11-10 NOTE — TELEPHONE ENCOUNTER
I have called in a 30-day supply of this medication.  Trying contact this patient and find out if he is taking his Basaglar or Metformin.  It looks like he is due for refills on these also.  He was supposed to follow-up last month for diabetes recheck    Tammy Guevara DO

## 2020-11-27 ENCOUNTER — OFFICE VISIT (OUTPATIENT)
Dept: FAMILY MEDICINE | Facility: CLINIC | Age: 40
End: 2020-11-27
Payer: COMMERCIAL

## 2020-11-27 VITALS
HEIGHT: 73 IN | HEART RATE: 96 BPM | WEIGHT: 262.4 LBS | DIASTOLIC BLOOD PRESSURE: 88 MMHG | TEMPERATURE: 98 F | SYSTOLIC BLOOD PRESSURE: 142 MMHG | BODY MASS INDEX: 34.78 KG/M2 | OXYGEN SATURATION: 96 %

## 2020-11-27 DIAGNOSIS — E11.9 TYPE 2 DIABETES, HBA1C GOAL < 8% (H): Primary | ICD-10-CM

## 2020-11-27 DIAGNOSIS — I10 HYPERTENSION GOAL BP (BLOOD PRESSURE) < 140/90: ICD-10-CM

## 2020-11-27 DIAGNOSIS — E78.5 HYPERLIPIDEMIA LDL GOAL <100: ICD-10-CM

## 2020-11-27 LAB — HBA1C MFR BLD: 9.9 % (ref 0–5.6)

## 2020-11-27 PROCEDURE — 90686 IIV4 VACC NO PRSV 0.5 ML IM: CPT | Performed by: FAMILY MEDICINE

## 2020-11-27 PROCEDURE — 99214 OFFICE O/P EST MOD 30 MIN: CPT | Mod: 25 | Performed by: FAMILY MEDICINE

## 2020-11-27 PROCEDURE — 90471 IMMUNIZATION ADMIN: CPT | Performed by: FAMILY MEDICINE

## 2020-11-27 PROCEDURE — 82043 UR ALBUMIN QUANTITATIVE: CPT | Performed by: FAMILY MEDICINE

## 2020-11-27 PROCEDURE — 36415 COLL VENOUS BLD VENIPUNCTURE: CPT | Performed by: FAMILY MEDICINE

## 2020-11-27 PROCEDURE — 83036 HEMOGLOBIN GLYCOSYLATED A1C: CPT | Performed by: FAMILY MEDICINE

## 2020-11-27 RX ORDER — AMLODIPINE BESYLATE 10 MG/1
10 TABLET ORAL DAILY
Qty: 90 TABLET | Refills: 1 | Status: SHIPPED | OUTPATIENT
Start: 2020-11-27 | End: 2021-05-11

## 2020-11-27 RX ORDER — INSULIN GLARGINE 100 [IU]/ML
70 INJECTION, SOLUTION SUBCUTANEOUS DAILY
Qty: 30 ML | Refills: 1 | Status: SHIPPED | OUTPATIENT
Start: 2020-11-27 | End: 2021-02-24

## 2020-11-27 RX ORDER — LIRAGLUTIDE 6 MG/ML
1.2 INJECTION SUBCUTANEOUS DAILY
Qty: 6 ML | Refills: 2 | Status: SHIPPED | OUTPATIENT
Start: 2020-11-27 | End: 2021-02-16

## 2020-11-27 ASSESSMENT — MIFFLIN-ST. JEOR: SCORE: 2153.08

## 2020-11-27 NOTE — PROGRESS NOTES
Subjective     Wilmer Rizvi is a 40 year old male who presents to clinic today for the following health issues:    HPI         Diabetes Follow-up      How often are you checking your blood sugar? Not at all, patient's meter has been broken for a few weeks, normally once or twice a day    What concerns do you have today about your diabetes? None     Do you have any of these symptoms? (Select all that apply)  No numbness or tingling in feet.  No redness, sores or blisters on feet.  No complaints of excessive thirst.  No reports of blurry vision.  No significant changes to weight.    Have you had a diabetic eye exam in the last 12 months? Yes- Date of last eye exam: 10/01/2020,  Location: [facility]     basaglar 68-70 units at Hs and metformin 1000 mg bid     Lab Results   Component Value Date    A1C 9.9 11/27/2020    A1C 8.9 07/16/2020    A1C 7.4 03/16/2020    A1C 10.5 12/17/2019    A1C 7.0 07/19/2013               Hyperlipidemia Follow-Up      Are you regularly taking any medication or supplement to lower your cholesterol?   Yes- Simvastatin    Are you having muscle aches or other side effects that you think could be caused by your cholesterol lowering medication?  No   LDL Cholesterol Calculated   Date Value Ref Range Status   03/16/2020 123 (H) <100 mg/dL Final     Comment:     Above desirable:  100-129 mg/dl  Borderline High:  130-159 mg/dL  High:             160-189 mg/dL  Very high:       >189 mg/dl             Hypertension Follow-up      Do you check your blood pressure regularly outside of the clinic? Yes     Are you following a low salt diet? No     Are your blood pressures ever more than 140 on the top number (systolic) OR more   than 90 on the bottom number (diastolic), for example 140/90? No 127/68 at home     BP Readings from Last 2 Encounters:   11/27/20 (!) 142/88   07/16/20 (!) 151/81     Hemoglobin A1C (%)   Date Value   11/27/2020 9.9 (H)   07/16/2020 8.9 (H)     LDL Cholesterol Calculated  "(mg/dL)   Date Value   03/16/2020 123 (H)   01/22/2013 92         How many servings of fruits and vegetables do you eat daily?  2-3    On average, how many sweetened beverages do you drink each day (Examples: soda, juice, sweet tea, etc.  Do NOT count diet or artificially sweetened beverages)?   0    How many days per week do you exercise enough to make your heart beat faster? 3 or less    How many minutes a day do you exercise enough to make your heart beat faster? 30 - 60  How many days per week do you miss taking your medication? 1    What makes it hard for you to take your medications?  forgets to take insulin at night, sometimes wakes up at night will take it      Review of Systems   Constitutional, HEENT, cardiovascular, pulmonary, GI, , musculoskeletal, neuro, skin, endocrine and psych systems are negative, except as otherwise noted.      Objective    BP (!) 142/88 (BP Location: Right arm, Patient Position: Chair, Cuff Size: Adult Large)   Pulse 96   Temp 98  F (36.7  C) (Tympanic)   Ht 1.861 m (6' 1.25\")   Wt 119 kg (262 lb 6.4 oz)   SpO2 96%   BMI 34.38 kg/m    Body mass index is 34.38 kg/m .  Physical Exam   GENERAL: healthy, alert and no distress  NECK: no adenopathy, no asymmetry, masses, or scars and thyroid normal to palpation  RESP: lungs clear to auscultation - no rales, rhonchi or wheezes  CV: regular rate and rhythm, normal S1 S2, no S3 or S4, no murmur, click or rub, no peripheral edema and peripheral pulses strong  MS: no gross musculoskeletal defects noted, no edema  PSYCH: mentation appears normal, affect normal/bright    Results for orders placed or performed in visit on 11/27/20 (from the past 24 hour(s))   Hemoglobin A1c   Result Value Ref Range    Hemoglobin A1C 9.9 (H) 0 - 5.6 %           Assessment & Plan     Wilmer was seen today for chronic disease management.    Diagnoses and all orders for this visit:    Type 2 diabetes, HbA1C goal < 8% (H)  -     Albumin Random Urine " Quantitative with Creat Ratio  -     Hemoglobin A1c  -     liraglutide (VICTOZA) 18 MG/3ML solution; Inject 1.2 mg Subcutaneous daily  -     insulin glargine (BASAGLAR KWIKPEN) 100 UNIT/ML pen; Inject 70 Units Subcutaneous daily  -     metFORMIN (GLUCOPHAGE) 1000 MG tablet; Take 1 tablet (1,000 mg) by mouth 2 times daily (with meals)  -     blood glucose monitoring (NO BRAND SPECIFIED) meter device kit; Use to test blood sugar 2 times daily or as directed.    Hypertension goal BP (blood pressure) < 140/90  -     amLODIPine (NORVASC) 10 MG tablet; Take 1 tablet (10 mg) by mouth daily    Hyperlipidemia LDL goal <100    Other orders  -     HC FLU VAC PRESRV FREE QUAD SPLIT VIR > 6 MONTHS IM ([id number])              Return in about 3 months (around 2/27/2021) for diabetes, BP Recheck.    Tammy Guevara [facility]

## 2020-12-01 LAB
CREAT UR-MCNC: 136 MG/DL
MICROALBUMIN UR-MCNC: 23 MG/L
MICROALBUMIN/CREAT UR: 17.06 MG/G CR (ref 0–17)

## 2020-12-19 ENCOUNTER — OFFICE VISIT (OUTPATIENT)
Dept: URGENT CARE | Facility: URGENT CARE | Age: 40
End: 2020-12-19
Payer: COMMERCIAL

## 2020-12-19 VITALS
SYSTOLIC BLOOD PRESSURE: 156 MMHG | WEIGHT: 261.38 LBS | TEMPERATURE: 97.5 F | RESPIRATION RATE: 12 BRPM | BODY MASS INDEX: 34.25 KG/M2 | HEART RATE: 89 BPM | DIASTOLIC BLOOD PRESSURE: 94 MMHG | OXYGEN SATURATION: 98 %

## 2020-12-19 DIAGNOSIS — Z51.89 VISIT FOR WOUND CARE: ICD-10-CM

## 2020-12-19 DIAGNOSIS — K61.1 PERIRECTAL ABSCESS: Primary | ICD-10-CM

## 2020-12-19 PROCEDURE — 99213 OFFICE O/P EST LOW 20 MIN: CPT | Performed by: PHYSICIAN ASSISTANT

## 2020-12-19 RX ORDER — DOCUSATE SODIUM 100 MG/1
100 CAPSULE, LIQUID FILLED ORAL
COMMUNITY
Start: 2020-12-16 | End: 2020-12-30

## 2020-12-19 RX ORDER — SULFAMETHOXAZOLE/TRIMETHOPRIM 800-160 MG
1 TABLET ORAL
COMMUNITY
Start: 2020-12-16 | End: 2020-12-26

## 2020-12-19 RX ORDER — CEPHALEXIN 500 MG/1
500 CAPSULE ORAL
COMMUNITY
Start: 2020-12-16 | End: 2020-12-26

## 2020-12-19 ASSESSMENT — ENCOUNTER SYMPTOMS
COLOR CHANGE: 1
WOUND: 1
WHEEZING: 0
PALPITATIONS: 0
RHINORRHEA: 0
FEVER: 0
SHORTNESS OF BREATH: 0
FATIGUE: 0
COUGH: 0
CARDIOVASCULAR NEGATIVE: 1
CHILLS: 0
RESPIRATORY NEGATIVE: 1
CHEST TIGHTNESS: 0
SORE THROAT: 0

## 2020-12-19 NOTE — PROGRESS NOTES
"Subjective   Wilmer Rizvi is a 40 year old male who presents to clinic today for the following health issues:  HPI   Wound care  Onset: 5days ago  Description:  Developed a perirectal abscess 5days ago and this was I&D in the ER with good relief.  The wound was dressed with packing strips and he was started on keflex and bactrim DS with good improvement.  Was told to have this rechecked in 3days.  Intensity: moderate  Progression of Symptoms:  improving  Accompanying Signs & Symptoms:  Continues to have moderate pain and mild drainage but no fevers.   No abdominal pain, n/v, constipation, diarrhea, bloody or black tarry stools.     Previous history of similar problem: see above  Precipitating factors:        Worsened by: none  Alleviating factors:        Improved by: none  Therapies tried and outcome: see above    Patient Active Problem List   Diagnosis     Type 2 diabetes, HbA1C goal < 8% (H)     Hyperlipidemia LDL goal <100     Hypertension goal BP (blood pressure) < 140/90     Environmental allergies     Current Outpatient Medications   Medication     amLODIPine (NORVASC) 10 MG tablet     BD HERRERA U/F 32G X 4 MM XX insulin pen needle     blood glucose (NO BRAND SPECIFIED) test strip     blood glucose monitoring (ACCU-CHEK FASTCLIX) lancets     blood glucose monitoring (NO BRAND SPECIFIED) meter device kit     Blood Glucose Monitoring Suppl (ACCU-CHEK GUIDE) w/Device KIT     fexofenadine (ALLEGRA) 180 MG tablet     fish oil-omega-3 fatty acids (FISH OIL) 1000 MG capsule     fluticasone (FLONASE) 50 MCG/ACT nasal spray     insulin glargine (BASAGLAR KWIKPEN) 100 UNIT/ML pen     insulin syringe-needle U-100 (BD SAFETYGLIDE INSULIN SYRINGE) 29G X 1/2\" 0.5 ML miscellaneous     liraglutide (VICTOZA) 18 MG/3ML solution     metFORMIN (GLUCOPHAGE) 1000 MG tablet     sildenafil (VIAGRA) 100 MG tablet     simvastatin (ZOCOR) 10 MG tablet     No current facility-administered medications for this visit.         Allergies "   Allergen Reactions     Amoxicillin Rash     Rash       Review of Systems   Constitutional: Negative for chills, fatigue and fever.   HENT: Negative.  Negative for congestion, ear pain, rhinorrhea and sore throat.    Respiratory: Negative.  Negative for cough, chest tightness, shortness of breath and wheezing.    Cardiovascular: Negative.  Negative for chest pain, palpitations and peripheral edema.   Skin: Positive for color change and wound. Negative for pallor and rash.   All other systems reviewed and are negative.           Objective    BP (!) 156/94 (BP Location: Left arm, Patient Position: Chair, Cuff Size: Adult Large)   Pulse 89   Temp 97.5  F (36.4  C) (Tympanic)   Resp 12   Wt 118.6 kg (261 lb 6 oz)   SpO2 98%   BMI 34.25 kg/m    Body mass index is 34.25 kg/m .  Physical Exam  Vitals signs and nursing note reviewed.   Constitutional:       General: He is not in acute distress.     Appearance: Normal appearance. He is well-developed. He is obese. He is not ill-appearing.   Skin:     General: Skin is warm and dry.      Capillary Refill: Capillary refill takes less than 2 seconds.      Findings: Abscess (over the R gluteal fold.  moderate tenderness but no erythema or discharge present) and wound present. No abrasion, acne, bruising, burn, ecchymosis, erythema, signs of injury, laceration, lesion, petechiae or rash. Rash is not crusting, macular, nodular, papular, purpuric, pustular, scaling, urticarial or vesicular.   Neurological:      Mental Status: He is alert and oriented to person, place, and time.   Psychiatric:         Mood and Affect: Mood normal.         Behavior: Behavior normal.         Thought Content: Thought content normal.         Judgment: Judgment normal.           Assessment & Plan   Perirectal abscess:  This was I&D in the ER with good relief.  Packing strips and dressings were changed today.  Continue with keflex and bactrim DS as directed.  Tylenol/ibuprofen as needed for pain.   Recheck in 3days for wound care. To the ER if worsening pain, fevers, drainage or redness.    Visit for wound care            Daniela Sow PA-C  [facility]

## 2020-12-29 ENCOUNTER — MYC MEDICAL ADVICE (OUTPATIENT)
Dept: FAMILY MEDICINE | Facility: CLINIC | Age: 40
End: 2020-12-29

## 2020-12-29 DIAGNOSIS — E11.9 TYPE 2 DIABETES, HBA1C GOAL < 8% (H): ICD-10-CM

## 2020-12-29 RX ORDER — PEN NEEDLE, DIABETIC 32GX 5/32"
NEEDLE, DISPOSABLE MISCELLANEOUS
Qty: 200 EACH | Refills: 1 | Status: SHIPPED | OUTPATIENT
Start: 2020-12-29 | End: 2021-07-27

## 2020-12-29 NOTE — TELEPHONE ENCOUNTER
Forwarding MyChart Rx change request to PCP.  Patient previously used one insulin pen needle daily, but now requires two due to taking Basaglar and Victoza.  Rx pended to reflect current use.    Kenna James RN  [facility]

## 2020-12-29 NOTE — TELEPHONE ENCOUNTER
MyChart sent requesting further clarification on needle size, etc, as there are a couple of past orders on file.  Will await patient's response.    Kenna James RN  [facility]

## 2021-03-04 ENCOUNTER — MYC MEDICAL ADVICE (OUTPATIENT)
Dept: FAMILY MEDICINE | Facility: CLINIC | Age: 41
End: 2021-03-04

## 2021-03-04 NOTE — TELEPHONE ENCOUNTER
Patient Quality Outreach      Summary:    Patient has the following on his problem list/HM:     Diabetes    Last A1C:  Lab Results   Component Value Date    A1C 9.9 11/27/2020    A1C 8.9 07/16/2020       Last LDL:    Lab Results   Component Value Date     03/16/2020       Is the patient on a Statin? Yes          Is the patient on Aspirin? No    Medications     HMG CoA Reductase Inhibitors     simvastatin (ZOCOR) 10 MG tablet             Last three blood pressure readings:  BP Readings from Last 3 Encounters:   12/19/20 (!) 156/94   11/27/20 (P) 128/88   07/16/20 (!) 151/81            Tobacco Use      Smoking status: Never Smoker      Smokeless tobacco: Never Used          Hypertension   Last three blood pressure readings:  BP Readings from Last 3 Encounters:   12/19/20 (!) 156/94   11/27/20 (P) 128/88   07/16/20 (!) 151/81     Blood pressure: Failed    HTN Guidelines:  ? 139/89     Patient is due/failing the following:   A1C and BP Check, Hypertension follow-up visit and Adult/Adolescent physical, date due: Overdue    Type of outreach:    Sent [email or web address] message.    Questions for provider review:    None                                                                                                               Zakiya Machuca MA       Chart routed to Care Team.

## 2021-03-04 NOTE — LETTER
March 18, 2021      Wilmer Rizvi  [address]  [place] [id number]      Your healthcare team cares about your health. To provide you with the best care, we have reviewed your chart and based on our findings, we see that you are due to:     - DIABETES FOLLOW UP: Schedule a diabetic follow up appointment as a Office Visit. Patients with diabetes should generally see their provider every 3-6 months.    - HYPERTENSION FOLLOW UP: Office Visit  - OTHER FOLLOW UP:  Annual Physical    If you have already completed these items, please contact the clinic via phone or [employer] so your care team can review and update your records.  Thank you for choosing [facility] for your healthcare needs. For any questions, concerns, or to schedule an appointment please contact the clinic.       Healthy Regards,      Your [facility] Care Team

## 2021-03-11 NOTE — TELEPHONE ENCOUNTER
Patient Quality Outreach 2nd Attempt      Summary:    Type of outreach:    Phone, left message for patient/parent to call back.    Next Steps:  Reach out within 90 days via Letter.    Max number of attempts reached: Yes. Will try again in 90 days if patient still on fail list.    Questions for provider review:    None                                                                                                                      Zakiya Machuca MA       Chart routed to Care Team.

## 2021-03-18 NOTE — TELEPHONE ENCOUNTER
Patient Quality Outreach 2nd Attempt      Summary:    Type of outreach:    Sent letter.    Next Steps:  Reach out within 90 days via Phone.    Max number of attempts reached: Yes. Will try again in 90 days if patient still on fail list.    Questions for provider review:    None                                                                                                                      Zakiya Machuca MA

## 2021-04-18 ENCOUNTER — HEALTH MAINTENANCE LETTER (OUTPATIENT)
Age: 41
End: 2021-04-18

## 2021-05-06 ENCOUNTER — MYC MEDICAL ADVICE (OUTPATIENT)
Dept: FAMILY MEDICINE | Facility: CLINIC | Age: 41
End: 2021-05-06

## 2021-05-06 DIAGNOSIS — E11.9 TYPE 2 DIABETES, HBA1C GOAL < 8% (H): ICD-10-CM

## 2021-05-06 RX ORDER — INSULIN GLARGINE 100 [IU]/ML
70 INJECTION, SOLUTION SUBCUTANEOUS DAILY
Qty: 30 ML | Refills: 0 | Status: SHIPPED | OUTPATIENT
Start: 2021-05-06 | End: 2021-05-18

## 2021-05-06 NOTE — TELEPHONE ENCOUNTER
Appointment for physical exam scheduled with patient over the phone for 5/18/2021. Please consider refills. Thank you.  Megan Thomas CMA ([facility])

## 2021-05-06 NOTE — TELEPHONE ENCOUNTER
Routing refill request to provider for review/approval because:  Labs not current:    Hemoglobin A1C   Date Value Ref Range Status   11/27/2020 9.9 (H) 0 - 5.6 % Final     Comment:     Normal <5.7% Prediabetes 5.7-6.4%  Diabetes 6.5% or higher - adopted from ADA   consensus guidelines.  Results confirmed by repeat test         Patient sent My Chart requesting assist with scheduling appointment.  Only has 2 pens left, one does not work    Cordell Wallace, RN, BSN, PHN  [facility]

## 2021-05-08 DIAGNOSIS — E11.9 TYPE 2 DIABETES, HBA1C GOAL < 8% (H): ICD-10-CM

## 2021-05-08 DIAGNOSIS — I10 HYPERTENSION GOAL BP (BLOOD PRESSURE) < 140/90: ICD-10-CM

## 2021-05-10 NOTE — TELEPHONE ENCOUNTER
Routing refill request to provider for review/approval because:  Labs not current:    Lab Results   Component Value Date    A1C 9.9 11/27/2020    A1C 8.9 07/16/2020    A1C 7.4 03/16/2020    A1C 10.5 12/17/2019    A1C 7.0 07/19/2013       BP under 140/90 in past 12 months    Zeny Caballero RN

## 2021-05-11 RX ORDER — AMLODIPINE BESYLATE 10 MG/1
TABLET ORAL
Qty: 30 TABLET | Refills: 0 | Status: SHIPPED | OUTPATIENT
Start: 2021-05-11 | End: 2021-05-18

## 2021-05-11 ASSESSMENT — ENCOUNTER SYMPTOMS
NAUSEA: 0
DIARRHEA: 1
PALPITATIONS: 0
HEARTBURN: 1
CHILLS: 0
DYSURIA: 0
SHORTNESS OF BREATH: 0
MYALGIAS: 0
HEMATURIA: 0
PARESTHESIAS: 0
SORE THROAT: 0
ABDOMINAL PAIN: 0
WEAKNESS: 0
DIZZINESS: 0
FREQUENCY: 0
ARTHRALGIAS: 0
CONSTIPATION: 1
EYE PAIN: 0
HEADACHES: 0
JOINT SWELLING: 0

## 2021-05-18 ENCOUNTER — OFFICE VISIT (OUTPATIENT)
Dept: FAMILY MEDICINE | Facility: CLINIC | Age: 41
End: 2021-05-18
Payer: COMMERCIAL

## 2021-05-18 VITALS
DIASTOLIC BLOOD PRESSURE: 88 MMHG | HEART RATE: 104 BPM | SYSTOLIC BLOOD PRESSURE: 142 MMHG | HEIGHT: 74 IN | WEIGHT: 269 LBS | BODY MASS INDEX: 34.52 KG/M2 | TEMPERATURE: 98 F

## 2021-05-18 DIAGNOSIS — Z13.220 SCREENING FOR HYPERLIPIDEMIA: ICD-10-CM

## 2021-05-18 DIAGNOSIS — E78.5 HYPERLIPIDEMIA LDL GOAL <100: ICD-10-CM

## 2021-05-18 DIAGNOSIS — Z00.00 ROUTINE GENERAL MEDICAL EXAMINATION AT A HEALTH CARE FACILITY: Primary | ICD-10-CM

## 2021-05-18 DIAGNOSIS — Z11.59 NEED FOR HEPATITIS C SCREENING TEST: ICD-10-CM

## 2021-05-18 DIAGNOSIS — E11.9 TYPE 2 DIABETES, HBA1C GOAL < 8% (H): ICD-10-CM

## 2021-05-18 DIAGNOSIS — I10 HYPERTENSION GOAL BP (BLOOD PRESSURE) < 140/90: ICD-10-CM

## 2021-05-18 LAB
ANION GAP SERPL CALCULATED.3IONS-SCNC: 7 MMOL/L (ref 3–14)
BUN SERPL-MCNC: 11 MG/DL (ref 7–30)
CALCIUM SERPL-MCNC: 9.4 MG/DL (ref 8.5–10.1)
CHLORIDE SERPL-SCNC: 106 MMOL/L (ref 94–109)
CHOLEST SERPL-MCNC: 214 MG/DL
CO2 SERPL-SCNC: 25 MMOL/L (ref 20–32)
CREAT SERPL-MCNC: 0.87 MG/DL (ref 0.66–1.25)
GFR SERPL CREATININE-BSD FRML MDRD: >90 ML/MIN/{1.73_M2}
GLUCOSE SERPL-MCNC: 191 MG/DL (ref 70–99)
HBA1C MFR BLD: 8.6 % (ref 0–5.6)
HDLC SERPL-MCNC: 33 MG/DL
LDLC SERPL CALC-MCNC: 134 MG/DL
NONHDLC SERPL-MCNC: 181 MG/DL
POTASSIUM SERPL-SCNC: 4.3 MMOL/L (ref 3.4–5.3)
SODIUM SERPL-SCNC: 138 MMOL/L (ref 133–144)
TRIGL SERPL-MCNC: 237 MG/DL

## 2021-05-18 PROCEDURE — 36415 COLL VENOUS BLD VENIPUNCTURE: CPT | Performed by: PHYSICIAN ASSISTANT

## 2021-05-18 PROCEDURE — 86803 HEPATITIS C AB TEST: CPT | Performed by: PHYSICIAN ASSISTANT

## 2021-05-18 PROCEDURE — 80061 LIPID PANEL: CPT | Performed by: PHYSICIAN ASSISTANT

## 2021-05-18 PROCEDURE — 99214 OFFICE O/P EST MOD 30 MIN: CPT | Mod: 25 | Performed by: PHYSICIAN ASSISTANT

## 2021-05-18 PROCEDURE — 80048 BASIC METABOLIC PNL TOTAL CA: CPT | Performed by: PHYSICIAN ASSISTANT

## 2021-05-18 PROCEDURE — 83036 HEMOGLOBIN GLYCOSYLATED A1C: CPT | Performed by: PHYSICIAN ASSISTANT

## 2021-05-18 PROCEDURE — 99396 PREV VISIT EST AGE 40-64: CPT | Performed by: PHYSICIAN ASSISTANT

## 2021-05-18 RX ORDER — LISINOPRIL 5 MG/1
5 TABLET ORAL DAILY
Qty: 90 TABLET | Refills: 1 | Status: SHIPPED | OUTPATIENT
Start: 2021-05-18 | End: 2021-09-10

## 2021-05-18 RX ORDER — INSULIN GLARGINE 100 [IU]/ML
70 INJECTION, SOLUTION SUBCUTANEOUS DAILY
Qty: 66 ML | Refills: 0 | Status: SHIPPED | OUTPATIENT
Start: 2021-05-18 | End: 2021-09-07

## 2021-05-18 RX ORDER — SIMVASTATIN 10 MG
10 TABLET ORAL AT BEDTIME
Qty: 90 TABLET | Refills: 3 | Status: SHIPPED | OUTPATIENT
Start: 2021-05-18 | End: 2022-06-06

## 2021-05-18 RX ORDER — LIRAGLUTIDE 6 MG/ML
1.2 INJECTION SUBCUTANEOUS DAILY
Qty: 18 ML | Refills: 1 | Status: SHIPPED | OUTPATIENT
Start: 2021-05-18 | End: 2021-09-07

## 2021-05-18 RX ORDER — AMLODIPINE BESYLATE 10 MG/1
10 TABLET ORAL DAILY
Qty: 90 TABLET | Refills: 0 | Status: SHIPPED | OUTPATIENT
Start: 2021-05-18 | End: 2021-08-09

## 2021-05-18 ASSESSMENT — ENCOUNTER SYMPTOMS
HEMATURIA: 0
NAUSEA: 0
ARTHRALGIAS: 0
ABDOMINAL PAIN: 0
EYE PAIN: 0
WEAKNESS: 0
DIZZINESS: 0
CHILLS: 0
SHORTNESS OF BREATH: 0
MYALGIAS: 0
CONSTIPATION: 1
PARESTHESIAS: 0
DYSURIA: 0
DIARRHEA: 1
PALPITATIONS: 0
JOINT SWELLING: 0
HEADACHES: 0
FREQUENCY: 0
SORE THROAT: 0
HEARTBURN: 1

## 2021-05-18 ASSESSMENT — MIFFLIN-ST. JEOR: SCORE: 2194.93

## 2021-05-18 ASSESSMENT — PAIN SCALES - GENERAL: PAINLEVEL: NO PAIN (0)

## 2021-05-18 NOTE — PROGRESS NOTES
SUBJECTIVE:   CC: Wilmer Rizvi is an 40 year old male who presents for preventative health visit.     Patient has been advised of split billing requirements and indicates understanding: Yes  Healthy Habits:     Getting at least 3 servings of Calcium per day:  Yes    Bi-annual eye exam:  Yes    Dental care twice a year:  Yes    Sleep apnea or symptoms of sleep apnea:  None    Diet:  Low salt, Diabetic and Carbohydrate counting    Frequency of exercise:  2-3 days/week    Duration of exercise:  30-45 minutes    Taking medications regularly:  Yes    Medication side effects:  None    PHQ-2 Total Score: 0    Additional concerns today:  No    Seeing me because he couldn't get in with his PCP until next week    1. Diabetes follow up: Taking long acting insulin in the mornings - does 65-70 units. His diet and exercise regimens need improvement. He has some questions about diet and lifestyle changes        Today's PHQ-2 Score:   PHQ-2 ( 1999 [employer]) 5/11/2021   Q1: Little interest or pleasure in doing things 0   Q2: Feeling down, depressed or hopeless 0   PHQ-2 Score 0   Q1: Little interest or pleasure in doing things Not at all   Q2: Feeling down, depressed or hopeless Not at all   PHQ-2 Score 0       Abuse: Current or Past(Physical, Sexual or Emotional)- No  Do you feel safe in your environment? Yes    Have you ever done Advance Care Planning? (For example, a Health Directive, POLST, or a discussion with a medical provider or your loved ones about your wishes): Yes, patient states has an Advance Care Planning document and will bring a copy to the clinic.    Social History     Tobacco Use     Smoking status: Never Smoker     Smokeless tobacco: Never Used   Substance Use Topics     Alcohol use: Yes     Comment: occasional     If you drink alcohol do you typically have >3 drinks per day or >7 drinks per week? No    Alcohol Use 5/11/2021   Prescreen: >3 drinks/day or >7 drinks/week? No   Prescreen: >3 drinks/day or >7  "drinks/week? -       Last PSA: No results found for: PSA    Reviewed orders with patient. Reviewed health maintenance and updated orders accordingly - Yes  Lab work is in process    Reviewed and updated as needed this visit by clinical staff  Tobacco  Allergies  Meds  Problems  Med Hx  Surg Hx  Fam Hx  Soc Hx          Reviewed and updated as needed this visit by Provider     Problems                Review of Systems   Constitutional: Negative for chills.   HENT: Negative for congestion, ear pain, hearing loss and sore throat.    Eyes: Negative for pain and visual disturbance.   Respiratory: Negative for shortness of breath.    Cardiovascular: Negative for chest pain, palpitations and peripheral edema.   Gastrointestinal: Positive for constipation, diarrhea and heartburn. Negative for abdominal pain and nausea.   Genitourinary: Positive for impotence. Negative for discharge, dysuria, frequency, genital sores, hematuria and urgency.   Musculoskeletal: Negative for arthralgias, joint swelling and myalgias.   Skin: Negative for rash.   Neurological: Negative for dizziness, weakness, headaches and paresthesias.   Psychiatric/Behavioral: Negative for mood changes.     OBJECTIVE:   BP (!) 142/88 (BP Location: Right arm, Patient Position: Sitting, Cuff Size: Adult Large)   Pulse 104   Temp 98  F (36.7  C) (Oral)   Ht 1.88 m (6' 2\")   Wt 122 kg (269 lb)   BMI 34.54 kg/m      Physical Exam  GENERAL: healthy, alert and no distress  EYES: Eyes grossly normal to inspection, PERRL and conjunctivae and sclerae normal  HENT: ear canals and TM's normal, nose and mouth without ulcers or lesions  NECK: no adenopathy, no asymmetry, masses, or scars and thyroid normal to palpation  RESP: lungs clear to auscultation - no rales, rhonchi or wheezes  CV: regular rate and rhythm, normal S1 S2, no S3 or S4, no murmur, click or rub, no peripheral edema and peripheral pulses strong  ABDOMEN: soft, nontender, no hepatosplenomegaly, " no masses and bowel sounds normal  MS: no gross musculoskeletal defects noted, no edema  SKIN: no suspicious lesions or rashes  NEURO: Normal strength and tone, mentation intact and speech normal  PSYCH: mentation appears normal, affect normal/bright  LYMPH: no cervical, supraclavicular, axillary, or inguinal adenopathy    Diagnostic Test Results:  Labs reviewed in Epic    ASSESSMENT/PLAN:   (Z00.00) Routine general medical examination at a health care facility  (primary encounter diagnosis)  Comment: Well person   Plan: Diet, exercise, wellness and other preventive recommendations related to health maintenance were discussed.  Follow up as needed for acute issues.  Physical exam in 1 year.     (E11.9) Type 2 diabetes, HbA1C goal < 8% (H)  Comment:   Plan: Hemoglobin A1c, Lipid panel reflex to direct         LDL Fasting, insulin glargine (BASAGLAR         KWIKPEN) 100 UNIT/ML pen, liraglutide (VICTOZA         PEN) 18 MG/3ML solution, metFORMIN (GLUCOPHAGE)        1000 MG tablet, AMBULATORY ADULT DIABETES         EDUCATOR REFERRAL, lisinopril (ZESTRIL) 5 MG         tablet, Basic metabolic panel  (Ca, Cl, CO2,         Creat, Gluc, K, Na, BUN)          Lab Results   Component Value Date    A1C 8.6 05/18/2021      Focus on diet, exercise, lifestyle changes. Recommend he increase basal insulin to 70-75 units. He might need pre-meal insulin considering how his A1C is staying above 8. He wants to do fundamentals and then go from there. Recommend seeing diabetes educator to spend more time on meal prep, planning, carbs, etc.     (Z11.59) Need for hepatitis C screening test  Comment:   Plan: Hepatitis C Screen Reflex to HCV RNA Quant and         Genotype          (Z13.220) Screening for hyperlipidemia  Comment:   Plan:     (I10) Hypertension goal BP (blood pressure) < 140/90  Comment:   Plan: amLODIPine (NORVASC) 10 MG tablet        Elevated. Restart Lisinopril. Not clear why he went off. Will do 5 mg to start     (E78.5)  "Hyperlipidemia LDL goal <100  Comment:   Plan: simvastatin (ZOCOR) 10 MG tablet        Refills     Patient has been advised of split billing requirements and indicates understanding: Yes  COUNSELING:   Reviewed preventive health counseling, as reflected in patient instructions    Estimated body mass index is 34.54 kg/m  as calculated from the following:    Height as of this encounter: 1.88 m (6' 2\").    Weight as of this encounter: 122 kg (269 lb).       He reports that he has never smoked. He has never used smokeless tobacco.      Counseling Resources:  ATP IV Guidelines  Pooled Cohorts Equation Calculator  FRAX Risk Assessment  ICSI Preventive Guidelines  Dietary Guidelines for Americans, 2010  USDA's MyPlate  ASA Prophylaxis  Lung CA Screening    REKHA WHEELER PA-C  [facility]  "

## 2021-05-18 NOTE — PATIENT INSTRUCTIONS
1. Consider doing an A1C in 3 months and then consider pre-meal insulin? You could see our [facility] clinical pharmacist to start that  2. Weight loss is a huge challenge - could see our weight loss clinic if you want at some point  3. Consider seeing diabetes educator (they will call you to schedule)     Preventive Health Recommendations  Male Ages 40 to 49    Yearly exam:             See your health care provider every year in order to  o   Review health changes.   o   Discuss preventive care.    o   Review your medicines if your doctor has prescribed any.    You should be tested each year for STDs (sexually transmitted diseases) if you re at risk.     Have a cholesterol test every 5 years.     Have a colonoscopy (test for colon cancer) if someone in your family has had colon cancer or polyps before age 50.     After age 45, have a diabetes test (fasting glucose). If you are at risk for diabetes, you should have this test every 3 years.      Talk with your health care provider about whether or not a prostate cancer screening test (PSA) is right for you.    Shots: Get a flu shot each year. Get a tetanus shot every 10 years.     Nutrition:    Eat at least 5 servings of fruits and vegetables daily.     Eat whole-grain bread, whole-wheat pasta and brown rice instead of white grains and rice.     Get adequate Calcium and Vitamin D.     Lifestyle    Exercise for at least 150 minutes a week (30 minutes a day, 5 days a week). This will help you control your weight and prevent disease.     Limit alcohol to one drink per day.     No smoking.     Wear sunscreen to prevent skin cancer.     See your dentist every six months for an exam and cleaning.

## 2021-05-19 ENCOUNTER — TELEPHONE (OUTPATIENT)
Dept: FAMILY MEDICINE | Facility: CLINIC | Age: 41
End: 2021-05-19

## 2021-05-19 LAB — HCV AB SERPL QL IA: NONREACTIVE

## 2021-05-19 NOTE — TELEPHONE ENCOUNTER
Diabetes Education Scheduling Outreach #1:    Call to patient to schedule. Left message with phone number to call to schedule.    Plan for 2nd outreach attempt within 1 week.    Robyn Mccallum  [facility]  Diabetes and Nutrition Scheduling

## 2021-05-26 NOTE — TELEPHONE ENCOUNTER
Diabetes Education Scheduling Outreach #2:    Sending [facility] message.    Robyn [facility]  Diabetes and Nutrition Scheduling

## 2021-06-08 DIAGNOSIS — E11.9 TYPE 2 DIABETES, HBA1C GOAL < 8% (H): ICD-10-CM

## 2021-08-09 DIAGNOSIS — I10 HYPERTENSION GOAL BP (BLOOD PRESSURE) < 140/90: ICD-10-CM

## 2021-08-09 RX ORDER — AMLODIPINE BESYLATE 10 MG/1
10 TABLET ORAL DAILY
Qty: 30 TABLET | Refills: 0 | Status: SHIPPED | OUTPATIENT
Start: 2021-08-09 | End: 2021-09-10

## 2021-08-09 NOTE — TELEPHONE ENCOUNTER
Routing refill request to provider for review/approval because:  Blood pressure under 140/90 in past 12 months    Zeny Caballero RN

## 2021-08-09 NOTE — TELEPHONE ENCOUNTER
Please let the patient know he needs a follow-up appointment for uncontrolled hypertension and diabetes    Tammy Guevara DO

## 2021-08-11 NOTE — TELEPHONE ENCOUNTER
[facility] 8/10/21-- patient aware he needs to schedule office visit for follow-up    Zakiya Machuca MA

## 2021-08-12 ENCOUNTER — MYC MEDICAL ADVICE (OUTPATIENT)
Dept: FAMILY MEDICINE | Facility: CLINIC | Age: 41
End: 2021-08-12

## 2021-08-12 NOTE — TELEPHONE ENCOUNTER
Patient Quality Outreach      Summary:    Patient has the following on his problem list/HM:     Diabetes    Last A1C:  Lab Results   Component Value Date    A1C 8.6 05/18/2021    A1C 9.9 11/27/2020       Last LDL:    Lab Results   Component Value Date     05/18/2021       Is the patient on a Statin? Yes          Is the patient on Aspirin? No    Medications     HMG CoA Reductase Inhibitors     simvastatin (ZOCOR) 10 MG tablet             Last three blood pressure readings:  BP Readings from Last 3 Encounters:   05/18/21 (!) 142/88   12/19/20 (!) 156/94   11/27/20 (P) 128/88            Tobacco Use      Smoking status: Never Smoker      Smokeless tobacco: Never Used          Hypertension   Last three blood pressure readings:  BP Readings from Last 3 Encounters:   05/18/21 (!) 142/88   12/19/20 (!) 156/94   11/27/20 (P) 128/88     Blood pressure: Failed    HTN Guidelines:  ? 139/89     Patient is due/failing the following:   Diabetic Follow-Up Visit    Type of outreach:    Sent [employer] message.    Questions for provider review:    None                                                                                                                                       Zakiya Machuca MA       Chart routed to Care Team.

## 2021-08-19 NOTE — TELEPHONE ENCOUNTER
Patient Quality Outreach 2nd Attempt      Summary:    Type of outreach:    Patient has read his [employer] message    Next Steps:  Reach out within 90 days via Phone.    Max number of attempts reached: Yes. Will try again in 90 days if patient still on fail list.    Questions for provider review:    None                                                                                                                      Zakiya Machuca MA

## 2021-09-04 ENCOUNTER — MYC MEDICAL ADVICE (OUTPATIENT)
Dept: FAMILY MEDICINE | Facility: CLINIC | Age: 41
End: 2021-09-04

## 2021-09-04 DIAGNOSIS — E11.9 TYPE 2 DIABETES, HBA1C GOAL < 8% (H): ICD-10-CM

## 2021-09-07 RX ORDER — INSULIN GLARGINE 100 [IU]/ML
70 INJECTION, SOLUTION SUBCUTANEOUS DAILY
Qty: 66 ML | Refills: 0 | Status: SHIPPED | OUTPATIENT
Start: 2021-09-07 | End: 2021-09-10

## 2021-09-07 RX ORDER — LIRAGLUTIDE 6 MG/ML
1.2 INJECTION SUBCUTANEOUS DAILY
Qty: 18 ML | Refills: 1 | Status: SHIPPED | OUTPATIENT
Start: 2021-09-07 | End: 2021-12-03

## 2021-09-07 NOTE — TELEPHONE ENCOUNTER
Routing refill request to provider for review/approval because:  Labs not current:    Lab Results   Component Value Date    A1C 8.6 05/18/2021    A1C 9.9 11/27/2020    A1C 8.9 07/16/2020    A1C 7.4 03/16/2020    A1C 10.5 12/17/2019     Scheduled for next visit 9/10/21    Zeny Caballero RN

## 2021-09-10 ENCOUNTER — OFFICE VISIT (OUTPATIENT)
Dept: FAMILY MEDICINE | Facility: CLINIC | Age: 41
End: 2021-09-10
Payer: COMMERCIAL

## 2021-09-10 VITALS
OXYGEN SATURATION: 96 % | SYSTOLIC BLOOD PRESSURE: 138 MMHG | HEIGHT: 73 IN | HEART RATE: 89 BPM | BODY MASS INDEX: 35.65 KG/M2 | RESPIRATION RATE: 16 BRPM | WEIGHT: 269 LBS | TEMPERATURE: 98.4 F | DIASTOLIC BLOOD PRESSURE: 80 MMHG

## 2021-09-10 DIAGNOSIS — E78.5 HYPERLIPIDEMIA LDL GOAL <100: ICD-10-CM

## 2021-09-10 DIAGNOSIS — E11.65 TYPE 2 DIABETES MELLITUS WITH HYPERGLYCEMIA, WITH LONG-TERM CURRENT USE OF INSULIN (H): Primary | ICD-10-CM

## 2021-09-10 DIAGNOSIS — Z79.4 TYPE 2 DIABETES MELLITUS WITH HYPERGLYCEMIA, WITH LONG-TERM CURRENT USE OF INSULIN (H): ICD-10-CM

## 2021-09-10 DIAGNOSIS — E11.65 TYPE 2 DIABETES MELLITUS WITH HYPERGLYCEMIA, WITH LONG-TERM CURRENT USE OF INSULIN (H): ICD-10-CM

## 2021-09-10 DIAGNOSIS — I10 HYPERTENSION GOAL BP (BLOOD PRESSURE) < 140/90: ICD-10-CM

## 2021-09-10 DIAGNOSIS — E11.9 TYPE 2 DIABETES, HBA1C GOAL < 8% (H): ICD-10-CM

## 2021-09-10 DIAGNOSIS — E66.01 MORBID OBESITY (H): ICD-10-CM

## 2021-09-10 DIAGNOSIS — Z79.4 TYPE 2 DIABETES MELLITUS WITH HYPERGLYCEMIA, WITH LONG-TERM CURRENT USE OF INSULIN (H): Primary | ICD-10-CM

## 2021-09-10 LAB — HBA1C MFR BLD: 8.7 % (ref 0–5.6)

## 2021-09-10 PROCEDURE — 36415 COLL VENOUS BLD VENIPUNCTURE: CPT | Performed by: FAMILY MEDICINE

## 2021-09-10 PROCEDURE — 99215 OFFICE O/P EST HI 40 MIN: CPT | Mod: 25 | Performed by: FAMILY MEDICINE

## 2021-09-10 PROCEDURE — 83036 HEMOGLOBIN GLYCOSYLATED A1C: CPT | Performed by: FAMILY MEDICINE

## 2021-09-10 PROCEDURE — 90686 IIV4 VACC NO PRSV 0.5 ML IM: CPT | Performed by: FAMILY MEDICINE

## 2021-09-10 PROCEDURE — 99207 PR FOOT EXAM NO CHARGE: CPT | Performed by: FAMILY MEDICINE

## 2021-09-10 PROCEDURE — 90471 IMMUNIZATION ADMIN: CPT | Performed by: FAMILY MEDICINE

## 2021-09-10 PROCEDURE — 82043 UR ALBUMIN QUANTITATIVE: CPT | Performed by: FAMILY MEDICINE

## 2021-09-10 RX ORDER — PEN NEEDLE, DIABETIC 32GX 5/32"
NEEDLE, DISPOSABLE MISCELLANEOUS
Qty: 100 EACH | Refills: 3 | Status: CANCELLED | OUTPATIENT
Start: 2021-09-10

## 2021-09-10 RX ORDER — ALBUTEROL SULFATE 108 UG/1
AEROSOL, METERED RESPIRATORY (INHALATION)
Qty: 100 EACH | Refills: 3 | Status: SHIPPED | OUTPATIENT
Start: 2021-09-10 | End: 2021-12-03

## 2021-09-10 RX ORDER — GLUCOSAMINE HCL/CHONDROITIN SU 500-400 MG
CAPSULE ORAL
Qty: 100 EACH | Refills: 3 | Status: SHIPPED | OUTPATIENT
Start: 2021-09-10 | End: 2021-12-03

## 2021-09-10 RX ORDER — FLURBIPROFEN SODIUM 0.3 MG/ML
SOLUTION/ DROPS OPHTHALMIC
Qty: 100 EACH | Refills: 3 | Status: CANCELLED | OUTPATIENT
Start: 2021-09-10

## 2021-09-10 RX ORDER — AMLODIPINE BESYLATE 10 MG/1
10 TABLET ORAL DAILY
Qty: 30 TABLET | Refills: 1 | Status: SHIPPED | OUTPATIENT
Start: 2021-09-10 | End: 2021-12-03

## 2021-09-10 RX ORDER — METHYLPREDNISOLONE SODIUM SUCCINATE 125 MG/2ML
INJECTION, POWDER, FOR SOLUTION INTRAMUSCULAR; INTRAVENOUS
Qty: 100 EACH | Refills: 3 | Status: CANCELLED | OUTPATIENT
Start: 2021-09-10

## 2021-09-10 RX ORDER — LISINOPRIL 10 MG/1
10 TABLET ORAL DAILY
Qty: 30 TABLET | Refills: 2 | Status: SHIPPED | OUTPATIENT
Start: 2021-09-10 | End: 2021-12-03

## 2021-09-10 ASSESSMENT — PAIN SCALES - GENERAL: PAINLEVEL: NO PAIN (0)

## 2021-09-10 ASSESSMENT — MIFFLIN-ST. JEOR: SCORE: 2174.06

## 2021-09-10 NOTE — PROGRESS NOTES
Assessment & Plan     Type 2 diabetes mellitus with hyperglycemia, with long-term current use of insulin (H)    We have the patient switch from Basaglar 80 units daily to Humulin 70/30 40 units twice daily with meals.  We will have him continue Victoza and Metformin    - FOOT EXAM  - Albumin Random Urine Quantitative with Creat Ratio; Future  - insulin NPH-Regular (HUMULIN MIX 70/30 KWIKPEN) susp; 40 units before breakfast  40 units before dinner  - insulin pen needle (ULTICARE) 29G X 12.7MM miscellaneous; Use 1 pen needles daily or as directed.  - alcohol swab prep pads; Use to swab area of injection/mayito as directed.  - Albumin Random Urine Quantitative with Creat Ratio    Hyperlipidemia LDL goal <100  The current medical regimen is effective;  continue present plan and medications.      Hypertension goal BP (blood pressure) < 140/90  Patient's goal blood pressure is less than 135/85 so will increase lisinopril from 5 mg to 10 mg daily and continue Norvasc 10 mg daily  - lisinopril (ZESTRIL) 10 MG tablet; Take 1 tablet (10 mg) by mouth daily    Morbid obesity (H)  Patient is working on diet and exercise        40 minutes spent on the date of the encounter doing chart review, review of test results, interpretation of tests, patient visit and documentation        Work on weight loss  Regular exercise    Return in about 3 months (around 12/10/2021) for diabetes, BP Recheck.    Tammy Guevara DO  [facility]    Raman Guillen is a 41 year old who presents for the following health issues     HPI     Diabetes Follow-up    How often are you checking your blood sugar? A few times a month  What time of day are you checking your blood sugars (select all that apply)?  Before meals  Have you had any blood sugars above 200?  No  Have you had any blood sugars below 70?  No    What symptoms do you notice when your blood sugar is low?  None    What concerns do you have today about your diabetes?  None     Do you have any of these symptoms? (Select all that apply)  No numbness or tingling in feet.  No redness, sores or blisters on feet.  No complaints of excessive thirst.  No reports of blurry vision.  No significant changes to weight.    Have you had a diabetic eye exam in the last 12 months? No- Due  Lab Results   Component Value Date    A1C 8.7 09/10/2021    A1C 8.6 05/18/2021    A1C 9.9 11/27/2020    A1C 8.9 07/16/2020    A1C 7.4 03/16/2020    A1C 10.5 12/17/2019       Basaglar 80 units daily, Victoza 1.2 mg daily and Metformin 1000 mg twice daily    His sugars are spiking with meals. He will need to come basaglar           A1c Chart     6-----135 average blood sugar   7-----170  8-----205  9-----240  10----275  11----310  12----345         Hyperlipidemia Follow-Up      Are you regularly taking any medication or supplement to lower your cholesterol?   Yes- simvastatin    Are you having muscle aches or other side effects that you think could be caused by your cholesterol lowering medication?  No   LDL Cholesterol Calculated   Date Value Ref Range Status   05/18/2021 134 (H) <100 mg/dL Final     Comment:     Above desirable:  100-129 mg/dl  Borderline High:  130-159 mg/dL  High:             160-189 mg/dL  Very high:       >189 mg/dl             Hypertension Follow-up      Do you check your blood pressure regularly outside of the clinic? No     Are you following a low salt diet? No    Are your blood pressures ever more than 140 on the top number (systolic) OR more   than 90 on the bottom number (diastolic), for example 140/90? Yes   Norvasc 10 mg daily and lisinopril 5 mg daily    BP Readings from Last 2 Encounters:   09/10/21 138/80   05/18/21 (!) 142/88     Hemoglobin A1C (%)   Date Value   09/10/2021 8.7 (H)   05/18/2021 8.6 (H)   11/27/2020 9.9 (H)     LDL Cholesterol Calculated (mg/dL)   Date Value   05/18/2021 134 (H)   03/16/2020 123 (H)         How many servings of fruits and vegetables do you eat  "daily?  4 or more    On average, how many sweetened beverages do you drink each day (Examples: soda, juice, sweet tea, etc.  Do NOT count diet or artificially sweetened beverages)?   0    How many days per week do you exercise enough to make your heart beat faster? 3 or less    How many minutes a day do you exercise enough to make your heart beat faster? 30 - 60    How many days per week do you miss taking your medication? 0        Review of Systems   Constitutional, HEENT, cardiovascular, pulmonary, gi and gu systems are negative, except as otherwise noted.      Objective    /80   Pulse 89   Temp 98.4  F (36.9  C) (Oral)   Resp 16   Ht 1.854 m (6' 1\")   Wt 122 kg (269 lb)   SpO2 96%   BMI 35.49 kg/m    Body mass index is 35.49 kg/m .  Physical Exam   GENERAL: healthy, alert and no distress  NECK: no adenopathy, no asymmetry, masses, or scars and thyroid normal to palpation  RESP: lungs clear to auscultation - no rales, rhonchi or wheezes  CV: regular rate and rhythm, normal S1 S2, no S3 or S4, no murmur, click or rub, no peripheral edema and peripheral pulses strong  MS: no gross musculoskeletal defects noted, no edema  PSYCH: mentation appears normal, affect normal/bright  Diabetic foot exam: normal DP and PT pulses, no trophic changes or ulcerative lesions, normal sensory exam and normal monofilament exam    Results for orders placed or performed in visit on 09/10/21 (from the past 24 hour(s))   Extra Tube    Narrative    The following orders were created for panel order Extra Tube.  Procedure                               Abnormality         Status                     ---------                               -----------         ------                     Extra Red Top Tube[[id number]]                                                          Extra Green Top (Lithium...[[id number]]                                                 Extra Purple Top Tube[[id number]]                                             "             Please view results for these tests on the individual orders.   Hemoglobin A1c   Result Value Ref Range    Hemoglobin A1C 8.7 (H) 0.0 - 5.6 %

## 2021-09-11 LAB
CREAT UR-MCNC: 64 MG/DL
MICROALBUMIN UR-MCNC: 17 MG/L
MICROALBUMIN/CREAT UR: 26.56 MG/G CR (ref 0–17)

## 2021-09-13 RX ORDER — HUMAN INSULIN 100 [USP'U]/ML
40 INJECTION, SUSPENSION SUBCUTANEOUS 2 TIMES DAILY WITH MEALS
Qty: 24 ML | Refills: 1 | Status: CANCELLED | OUTPATIENT
Start: 2021-09-13 | End: 2021-11-12

## 2021-09-13 NOTE — TELEPHONE ENCOUNTER
Routing to PCP,     Insurance not covering humulin  Alternative is novolin-pended.    Please verify if same dosing instructions        Rylee Ramirez RN

## 2021-11-19 ENCOUNTER — TRANSFERRED RECORDS (OUTPATIENT)
Dept: MULTI SPECIALTY CLINIC | Facility: CLINIC | Age: 41
End: 2021-11-19
Payer: COMMERCIAL

## 2021-11-20 LAB — RETINOPATHY: NORMAL

## 2021-12-03 ENCOUNTER — OFFICE VISIT (OUTPATIENT)
Dept: FAMILY MEDICINE | Facility: CLINIC | Age: 41
End: 2021-12-03
Payer: COMMERCIAL

## 2021-12-03 VITALS
WEIGHT: 274.4 LBS | SYSTOLIC BLOOD PRESSURE: 136 MMHG | OXYGEN SATURATION: 97 % | DIASTOLIC BLOOD PRESSURE: 80 MMHG | HEART RATE: 85 BPM | BODY MASS INDEX: 36.37 KG/M2 | TEMPERATURE: 98.1 F | HEIGHT: 73 IN | RESPIRATION RATE: 16 BRPM

## 2021-12-03 DIAGNOSIS — Z23 HIGH PRIORITY FOR 2019-NCOV VACCINE: ICD-10-CM

## 2021-12-03 DIAGNOSIS — Z79.4 TYPE 2 DIABETES MELLITUS WITH HYPERGLYCEMIA, WITH LONG-TERM CURRENT USE OF INSULIN (H): Primary | ICD-10-CM

## 2021-12-03 DIAGNOSIS — I10 HYPERTENSION GOAL BP (BLOOD PRESSURE) < 140/90: ICD-10-CM

## 2021-12-03 DIAGNOSIS — E11.65 TYPE 2 DIABETES MELLITUS WITH HYPERGLYCEMIA, WITH LONG-TERM CURRENT USE OF INSULIN (H): Primary | ICD-10-CM

## 2021-12-03 LAB
HBA1C MFR BLD: 7.7 % (ref 0–5.6)
HOLD SPECIMEN: NORMAL

## 2021-12-03 PROCEDURE — 99214 OFFICE O/P EST MOD 30 MIN: CPT | Mod: 25 | Performed by: FAMILY MEDICINE

## 2021-12-03 PROCEDURE — 83036 HEMOGLOBIN GLYCOSYLATED A1C: CPT | Performed by: FAMILY MEDICINE

## 2021-12-03 PROCEDURE — 0004A COVID-19,PF,PFIZER (12+ YRS): CPT | Performed by: FAMILY MEDICINE

## 2021-12-03 PROCEDURE — 36415 COLL VENOUS BLD VENIPUNCTURE: CPT | Performed by: FAMILY MEDICINE

## 2021-12-03 PROCEDURE — 91300 COVID-19,PF,PFIZER (12+ YRS): CPT | Performed by: FAMILY MEDICINE

## 2021-12-03 RX ORDER — PEN NEEDLE, DIABETIC 32GX 5/32"
NEEDLE, DISPOSABLE MISCELLANEOUS
Qty: 200 EACH | Refills: 1 | Status: SHIPPED | OUTPATIENT
Start: 2021-12-03 | End: 2022-03-04

## 2021-12-03 RX ORDER — LISINOPRIL 10 MG/1
10 TABLET ORAL DAILY
Qty: 30 TABLET | Refills: 2 | Status: SHIPPED | OUTPATIENT
Start: 2021-12-03 | End: 2022-06-06

## 2021-12-03 RX ORDER — LIRAGLUTIDE 6 MG/ML
1.2 INJECTION SUBCUTANEOUS DAILY
Qty: 18 ML | Refills: 1 | Status: SHIPPED | OUTPATIENT
Start: 2021-12-03 | End: 2022-06-06

## 2021-12-03 RX ORDER — AMLODIPINE BESYLATE 10 MG/1
10 TABLET ORAL DAILY
Qty: 90 TABLET | Refills: 1 | Status: SHIPPED | OUTPATIENT
Start: 2021-12-03 | End: 2022-06-02

## 2021-12-03 RX ORDER — ALBUTEROL SULFATE 108 UG/1
AEROSOL, METERED RESPIRATORY (INHALATION)
Qty: 100 EACH | Refills: 3 | Status: SHIPPED | OUTPATIENT
Start: 2021-12-03 | End: 2022-03-04 | Stop reason: ALTCHOICE

## 2021-12-03 ASSESSMENT — PAIN SCALES - GENERAL: PAINLEVEL: NO PAIN (0)

## 2021-12-03 ASSESSMENT — MIFFLIN-ST. JEOR: SCORE: 2198.55

## 2021-12-03 NOTE — PROGRESS NOTES
"  Assessment & Plan     Type 2 diabetes mellitus with hyperglycemia, with long-term current use of insulin (H)  The current medical regimen is effective;  continue present plan and medications.    - Hemoglobin A1c; Future  - Extra Tube; Future  - Extra Tube  - Hemoglobin A1c  - BD HERRERA U/F 32G X 4 MM insulin pen needle; Use 3 pen needles daily or as directed.  - insulin NPH-Regular (HUMULIN 70/30;NOVOLIN 70/30) susp; 40 units before breakfast  40 units before dinner  - insulin pen needle (ULTICARE) 29G X 12.7MM miscellaneous; Use 1 pen needles daily or as directed.  - liraglutide (VICTOZA PEN) 18 MG/3ML solution; Inject 1.2 mg Subcutaneous daily  - metFORMIN (GLUCOPHAGE) 1000 MG tablet; TAKE 1 TABLET BY MOUTH TWICE A DAY WITH MEALS    Hypertension goal BP (blood pressure) < 140/90  The current medical regimen is effective;  continue present plan and medications.  - amLODIPine (NORVASC) 10 MG tablet; Take 1 tablet (10 mg) by mouth daily  - lisinopril (ZESTRIL) 10 MG tablet; Take 1 tablet (10 mg) by mouth daily    High priority for 2019-nCoV vaccine  Covid booster today        30 minutes spent on the date of the encounter doing chart review, history and exam, documentation and further activities per the note       BMI:   Estimated body mass index is 36.2 kg/m  as calculated from the following:    Height as of this encounter: 1.854 m (6' 1\").    Weight as of this encounter: 124.5 kg (274 lb 6.4 oz).   Weight management plan: Discussed healthy diet and exercise guidelines      Return in about 3 months (around 3/3/2022) for diabetes, BP Recheck.    Tammy Guevara DO  [facility]    Raman Guillen is a 41 year old who presents for the following health issues     HPI     Diabetes Follow-up    How often are you checking your blood sugar? One time daily  What time of day are you checking your blood sugars (select all that apply)?  Before and after meals  Have you had any blood sugars above 200?  " No  Have you had any blood sugars below 70?  No    What symptoms do you notice when your blood sugar is low?  None    What concerns do you have today about your diabetes? None     Do you have any of these symptoms? (Select all that apply)  No numbness or tingling in feet.  No redness, sores or blisters on feet.  No complaints of excessive thirst.  No reports of blurry vision.  No significant changes to weight.    Have you had a diabetic eye exam in the last 12 months? Yes- Date of last eye exam: 11/20/21,  Location: [facility]     Humulin 70/30 40 units twice daily, Metformin 1000 mg twice daily and Victoza 1.2 mg subcu daily    He was switched from Basaglar 80 units daily to Humulin 70/30 40 units twice daily at last office visit 3 months ago.  Lab Results   Component Value Date    A1C 7.7 12/03/2021    A1C 8.7 09/10/2021    A1C 8.6 05/18/2021    A1C 9.9 11/27/2020    A1C 8.9 07/16/2020    A1C 7.4 03/16/2020    A1C 10.5 12/17/2019               Hyperlipidemia Follow-Up      Are you regularly taking any medication or supplement to lower your cholesterol?   Yes- simvastatin    Are you having muscle aches or other side effects that you think could be caused by your cholesterol lowering medication?  No   LDL Cholesterol Calculated   Date Value Ref Range Status   05/18/2021 134 (H) <100 mg/dL Final     Comment:     Above desirable:  100-129 mg/dl  Borderline High:  130-159 mg/dL  High:             160-189 mg/dL  Very high:       >189 mg/dl             Hypertension Follow-up      Do you check your blood pressure regularly outside of the clinic? Yes     Are you following a low salt diet? Yes    Are your blood pressures ever more than 140 on the top number (systolic) OR more   than 90 on the bottom number (diastolic), for example 140/90? No   Norvasc 10 mg daily and lisinopril 10 mg daily    BP Readings from Last 2 Encounters:   12/03/21 (!) 144/84   09/10/21 138/80     Hemoglobin A1C (%)   Date Value   12/03/2021  "7.7 (H)   09/10/2021 8.7 (H)   05/18/2021 8.6 (H)   11/27/2020 9.9 (H)     LDL Cholesterol Calculated (mg/dL)   Date Value   05/18/2021 134 (H)   03/16/2020 123 (H)         How many servings of fruits and vegetables do you eat daily?  2-3    On average, how many sweetened beverages do you drink each day (Examples: soda, juice, sweet tea, etc.  Do NOT count diet or artificially sweetened beverages)?   0    How many days per week do you exercise enough to make your heart beat faster? 4    How many minutes a day do you exercise enough to make your heart beat faster? 20 - 29    How many days per week do you miss taking your medication? 0      Review of Systems   Constitutional, HEENT, cardiovascular, pulmonary, gi and gu systems are negative, except as otherwise noted.      Objective    BP (!) 144/84 (BP Location: Right arm, Patient Position: Sitting, Cuff Size: Adult Large)   Pulse 85   Temp 98.1  F (36.7  C) (Oral)   Resp 16   Ht 1.854 m (6' 1\")   Wt 124.5 kg (274 lb 6.4 oz)   SpO2 97%   BMI 36.20 kg/m    Body mass index is 36.2 kg/m .  Physical Exam   GENERAL: healthy, alert and no distress  RESP: lungs clear to auscultation - no rales, rhonchi or wheezes  CV: regular rate and rhythm, normal S1 S2, no S3 or S4, no murmur, click or rub, no peripheral edema and peripheral pulses strong  MS: no gross musculoskeletal defects noted, no edema  SKIN: no suspicious lesions or rashes  PSYCH: mentation appears normal, affect normal/bright    Results for orders placed or performed in visit on 12/03/21 (from the past 24 hour(s))   Extra Tube    Narrative    The following orders were created for panel order Extra Tube.  Procedure                               Abnormality         Status                     ---------                               -----------         ------                     Extra Red Top Tube[[id number]]                               In process                 Extra Green Top (Lithium...[[id number]]          "             In process                 Extra Purple Top Tube[[id number]]                            In process                   Please view results for these tests on the individual orders.   Hemoglobin A1c   Result Value Ref Range    Hemoglobin A1C 7.7 (H) 0.0 - 5.6 %

## 2022-02-28 DIAGNOSIS — Z79.4 TYPE 2 DIABETES MELLITUS WITH HYPERGLYCEMIA, WITH LONG-TERM CURRENT USE OF INSULIN (H): ICD-10-CM

## 2022-02-28 DIAGNOSIS — E11.65 TYPE 2 DIABETES MELLITUS WITH HYPERGLYCEMIA, WITH LONG-TERM CURRENT USE OF INSULIN (H): ICD-10-CM

## 2022-02-28 NOTE — TELEPHONE ENCOUNTER
[facility] faxed a Patient Requests New RX    PHARMACY COMMENTS:  PATIENT REQUESTS NEW RX:  Please send us a new RX for 90 days supply for Humulin

## 2022-03-01 RX ORDER — INSULIN HUMAN 100 [IU]/ML
INJECTION, SUSPENSION SUBCUTANEOUS
Qty: 75 ML | Refills: 0 | Status: SHIPPED | OUTPATIENT
Start: 2022-03-01 | End: 2022-03-04

## 2022-03-04 ENCOUNTER — OFFICE VISIT (OUTPATIENT)
Dept: FAMILY MEDICINE | Facility: CLINIC | Age: 42
End: 2022-03-04
Payer: COMMERCIAL

## 2022-03-04 VITALS
HEART RATE: 92 BPM | BODY MASS INDEX: 37.03 KG/M2 | RESPIRATION RATE: 22 BRPM | TEMPERATURE: 97.6 F | SYSTOLIC BLOOD PRESSURE: 138 MMHG | DIASTOLIC BLOOD PRESSURE: 88 MMHG | HEIGHT: 73 IN | WEIGHT: 279.4 LBS | OXYGEN SATURATION: 97 %

## 2022-03-04 DIAGNOSIS — S46.912A STRAIN OF LEFT SHOULDER, INITIAL ENCOUNTER: ICD-10-CM

## 2022-03-04 DIAGNOSIS — E66.01 MORBID OBESITY (H): ICD-10-CM

## 2022-03-04 DIAGNOSIS — E11.65 TYPE 2 DIABETES MELLITUS WITH HYPERGLYCEMIA, WITH LONG-TERM CURRENT USE OF INSULIN (H): Primary | ICD-10-CM

## 2022-03-04 DIAGNOSIS — Z79.4 TYPE 2 DIABETES MELLITUS WITH HYPERGLYCEMIA, WITH LONG-TERM CURRENT USE OF INSULIN (H): Primary | ICD-10-CM

## 2022-03-04 LAB
HBA1C MFR BLD: 8.1 % (ref 0–5.6)
HOLD SPECIMEN: NORMAL
HOLD SPECIMEN: NORMAL

## 2022-03-04 PROCEDURE — 83036 HEMOGLOBIN GLYCOSYLATED A1C: CPT | Performed by: FAMILY MEDICINE

## 2022-03-04 PROCEDURE — 99215 OFFICE O/P EST HI 40 MIN: CPT | Performed by: FAMILY MEDICINE

## 2022-03-04 PROCEDURE — 36415 COLL VENOUS BLD VENIPUNCTURE: CPT | Performed by: FAMILY MEDICINE

## 2022-03-04 RX ORDER — PEN NEEDLE, DIABETIC 32GX 5/32"
NEEDLE, DISPOSABLE MISCELLANEOUS
Qty: 300 EACH | Refills: 4 | Status: SHIPPED | OUTPATIENT
Start: 2022-03-04 | End: 2023-09-21

## 2022-03-04 RX ORDER — INSULIN HUMAN 100 [IU]/ML
INJECTION, SUSPENSION SUBCUTANEOUS
Qty: 75 ML | Refills: 1 | Status: SHIPPED | OUTPATIENT
Start: 2022-03-04 | End: 2022-06-06

## 2022-03-04 RX ORDER — LIRAGLUTIDE 6 MG/ML
1.8 INJECTION SUBCUTANEOUS DAILY
Qty: 20 ML | Refills: 1 | Status: SHIPPED | OUTPATIENT
Start: 2022-03-04 | End: 2022-06-03

## 2022-03-04 ASSESSMENT — PAIN SCALES - GENERAL: PAINLEVEL: MILD PAIN (2)

## 2022-03-04 NOTE — PROGRESS NOTES
Assessment & Plan     Type 2 diabetes mellitus with hyperglycemia, with long-term current use of insulin (H)  We will increase Victoza from 1.2 to 1.8 mg daily and recheck in 3 months    - Hemoglobin A1c; Future  - Extra Tube; Future  - Hemoglobin A1c  - Extra Tube  - insulin NPH-Regular (HUMULIN MIX 70/30 KWIKPEN) susp; INJECT 40 UNITS SUB-Q BEFORE BREAKFAST AND 40 UNITS BEFORE DINNER  - liraglutide (VICTOZA) 18 MG/3ML solution; Inject 1.8 mg Subcutaneous daily  - BD HERRERA U/F 32G X 4 MM insulin pen needle; Use 3 pen needles daily or as directed.    Morbid obesity (H)  Complicated by diabetes    Strain of left shoulder, initial encounter  Start physical therapy  - Physical Therapy Referral; Future      40 minutes spent on the date of the encounter doing chart review, history and exam, documentation and further activities per the note      Return in about 3 months (around 6/4/2022) for diabetes.    Tammy Guevara [facility] JERAD Guillen is a 41 year old who presents for the following health issues  accompanied by his none.    History of Present Illness       Diabetes:   He presents for follow up of diabetes.  He is checking home blood glucose a few times a week. He checks blood glucose before meals and before and after meals.  Blood glucose is never over 200 and never under 70. When his blood glucose is low, the patient is asymptomatic for confusion, blurred vision, lethargy and reports not feeling dizzy, shaky, or weak.  He has no concerns regarding his diabetes at this time.  He is not experiencing numbness or burning in feet, excessive thirst, blurry vision, weight changes or redness, sores or blisters on feet.       Reason for visit:  Diabetes check-up & Left shoulder Pain  Symptom onset:  3-4 weeks ago  Symptoms include:  Sore left shoulder pain when extending/rotation horizontally and vertically  Symptom intensity:  Mild  Symptom progression:  Staying the same  Had these  "symptoms before:  No  What makes it worse:  Over extending, high lateral or horizontal movements  What makes it better:  Keeping under 30 degrees or less in horizontal or vertical movement and arm close to the body    He eats 2-3 servings of fruits and vegetables daily.He consumes 0 sweetened beverage(s) daily.He exercises with enough effort to increase his heart rate 20 to 29 minutes per day.  He exercises with enough effort to increase his heart rate 4 days per week.   He is taking medications regularly.     Metformin 1000 mg twice daily, Victoza 1.2 mg subcu daily Humulin 70/30 40 units with breakfast and dinner  Lab Results   Component Value Date    A1C 8.1 03/04/2022    A1C 7.7 12/03/2021    A1C 8.7 09/10/2021    A1C 8.6 05/18/2021    A1C 9.9 11/27/2020    A1C 8.9 07/16/2020    A1C 7.4 03/16/2020    A1C 10.5 12/17/2019     Patient has hypertension and is taking Norvasc 10 mg daily and lisinopril 10 mg daily    He has dyslipidemia and takes Zocor 10 mg daily    He is complaining of left shoulder pain after lifting something and turning.  He feels the pain with reaching or pulling.  He is complaining of pain in the posterior shoulder and upper arm.  It is worse with sleeping on that side and driving.      Review of Systems   Constitutional, HEENT, cardiovascular, pulmonary, gi and gu systems are negative, except as otherwise noted.      Objective    /88 (BP Location: Right arm, Patient Position: Sitting, Cuff Size: Adult Large)   Pulse 92   Temp 97.6  F (36.4  C) (Tympanic)   Resp 22   Ht 1.854 m (6' 1\")   Wt 126.7 kg (279 lb 6.4 oz)   SpO2 97%   BMI 36.86 kg/m    Body mass index is 36.86 kg/m .  Physical Exam   GENERAL: healthy, alert and no distress  NECK: no adenopathy, no asymmetry, masses, or scars and thyroid normal to palpation  RESP: lungs clear to auscultation - no rales, rhonchi or wheezes  CV: regular rate and rhythm, normal S1 S2, no S3 or S4, no murmur, click or rub, no peripheral edema " and peripheral pulses strong  MS: Non tender over biceps tendon, deltoid tendon and rotator cuff, full painless ROM, good strength of rotator cuff  SKIN: no suspicious lesions or rashes  NEURO: Normal strength and tone, mentation intact and speech normal  PSYCH: mentation appears normal, affect normal/bright    Results for orders placed or performed in visit on 03/04/22 (from the past 24 hour(s))   Hemoglobin A1c   Result Value Ref Range    Hemoglobin A1C 8.1 (H) 0.0 - 5.6 %

## 2022-03-23 ENCOUNTER — THERAPY VISIT (OUTPATIENT)
Dept: PHYSICAL THERAPY | Facility: CLINIC | Age: 42
End: 2022-03-23
Attending: FAMILY MEDICINE
Payer: COMMERCIAL

## 2022-03-23 DIAGNOSIS — G89.29 CHRONIC LEFT SHOULDER PAIN: ICD-10-CM

## 2022-03-23 DIAGNOSIS — S46.912A STRAIN OF LEFT SHOULDER, INITIAL ENCOUNTER: ICD-10-CM

## 2022-03-23 DIAGNOSIS — M25.512 CHRONIC LEFT SHOULDER PAIN: ICD-10-CM

## 2022-03-23 PROCEDURE — 97110 THERAPEUTIC EXERCISES: CPT | Mod: GP | Performed by: PHYSICAL THERAPIST

## 2022-03-23 PROCEDURE — 97161 PT EVAL LOW COMPLEX 20 MIN: CPT | Mod: GP | Performed by: PHYSICAL THERAPIST

## 2022-03-23 NOTE — PROGRESS NOTES
Physical Therapy Initial Evaluation  Subjective:  Subjective:   Wilmer Rizvi is a 41 year old male who presents to outpatient physical therapy for evaluation. His symptoms consist of:  1. L shoulder pain    Patient Comments (HPI): Reports symptoms came on in January 2022, but he feels that he has had something going on with his shoulder for a longer period of time. He lifted a box away from his body and felt pain. Felt like arm wanted to give out but did not. No feelings of instability. Pain is in L lateral shoulder, upper trap, sometimes radiates down to the elbow. Has some subjective paresthesias in L hand but is unsure. He mentions his mother had some sort nerve issue in the arm and he wonders if this is what he is feeling. He is right handed. Feels symptoms most at the end of the day.    Overall, he reports his status remains unchanged.    He denies red flags, including Numbness, Dizziness, Diplopia, Dysarthria and Dysphagia.  He reports the following red flags: none.    Aggravating Factors: reaching up/out, reaching behind back, laying on L side  Relieving Factors: Advil, rest     Previous Treatments: none    General health as reported by patient: good.    PMH: HTN, Diabetes. I briefly reviewed the review of systems as noted on the health history form.  I am only responding to those symptoms which are directly relevant the specific indication for my consultation. I recommended the patient follow up with their primary care referring provider to pursue any other symptoms which may be of concern.    Surgical history/trauma: None. He denies any significant current illness or recent hospital admissions. He denies any regional implanted devices.  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    Barriers to treatment: none    Patient Self-identified Goal: Wilmer Rizvi would like to understand what is going on in his arm and making a plan to help improve his function. He would like to lift weights again and bike ride.       The history is provided by the patient.   Patient Health History  Wilmer Rizvi being seen for Left shoulder pain.     Problem began: 1/10/2022.   Problem occurred: Unknown   Pain is reported as 2/10 on pain scale.  General health as reported by patient is good.  Pertinent medical history includes: diabetes and high blood pressure.     Medical allergies: other. Other medical allergies details: Amoxicillian.   Surgeries include:  None.    Current medications:  High blood pressure medication.       Primary job tasks include:  Computer work, lifting/carrying, prolonged sitting, prolonged standing, pushing/pulling and repetitive tasks.                                    Objective:        Flexibility/Screens:   Negative screens: Cervical (negative spurlings, overpressure)                              Shoulder Evaluation:  ROM:  AROM:  normal (EXCEPT IR: L5 L, T8 R. Painful arc)                              Pain: painful arc and mildly in end range    Strength:  : intact along myotomes C1-T1.  Flexion: Left:/5 strong/painful    Pain: +    Right: /5 strong/pain free    Pain:     Abduction:  Left:/5  Strong/painful  Pain:++    Right:/5  Strong/pain free    Pain:    Internal Rotation:  Left:/5  Strong/pain free    Pain:    Right:/5  Strong/pain free    Pain:  External Rotation:   Left:/5  Strong/pain free    Pain:   Right:/5  Strong/pain free    Pain:            Stability Testing:  not assessed      Special Tests:    Left shoulder positive for the following special tests:  Rotator cuff tear (POS+ subscap lift off, full can; NEG- hornblower, drop arm) and Acromioclavicular (Mildly POS+ Paxino's sign)  Left shoulder negative for the following special tests:  Impingement (HK and Neer)    Right  shoulder negative for the following special tests:Rotator cuff tear and Acrimioclavicular  Palpation:    Left shoulder tenderness present at:  Acrimioclavicular and Levator  Left shoulder tenderness not present at: Biceps or Supraspinatus    Right shoulder tenderness not present at:Acrimioclavicular; Biceps or Supraspinatus                                     General     ROS    Assessment/Plan:    Patient is a 41 year old male with left side shoulder complaints.    Patient has the following significant findings with corresponding treatment plan.                Diagnosis 1:  L shoulder pain (differentials partial rotator cuff tear vs tendinopathy, subacromial pain syndrome, AC joint dysfunction)    Pain -  hot/cold therapy, manual therapy, self management and education  Decreased ROM/flexibility - manual therapy, therapeutic exercise and therapeutic activity  Decreased strength - therapeutic exercise, therapeutic activities and home program  Impaired muscle performance - neuro re-education and home program  Decreased function - therapeutic activities and home program    Therapy Evaluation Codes:   Cumulative Therapy Evaluation is: Low complexity.    Previous and current functional limitations:  (See Goal Flow Sheet for this information)    Short term and Long term goals: (See Goal Flow Sheet for this information)     Communication ability:  Patient appears to be able to clearly communicate and understand verbal and written communication and follow directions correctly.  Treatment Explanation - The following has been discussed with the patient:   RX ordered/plan of care  Anticipated outcomes  Possible risks and side effects  This patient would benefit from PT intervention to resume normal activities.   Rehab potential is excellent.    Frequency:  2-3 X a month, once daily  Duration:  for 8 weeks  Discharge Plan:  Achieve all LTG.  Independent in home treatment program.  Reach maximal therapeutic benefit.    Please  refer to the daily flowsheet for treatment today, total treatment time and time spent performing 1:1 timed codes.

## 2022-04-06 ENCOUNTER — THERAPY VISIT (OUTPATIENT)
Dept: PHYSICAL THERAPY | Facility: CLINIC | Age: 42
End: 2022-04-06
Payer: COMMERCIAL

## 2022-04-06 DIAGNOSIS — M25.512 CHRONIC LEFT SHOULDER PAIN: ICD-10-CM

## 2022-04-06 DIAGNOSIS — G89.29 CHRONIC LEFT SHOULDER PAIN: ICD-10-CM

## 2022-04-06 PROCEDURE — 97140 MANUAL THERAPY 1/> REGIONS: CPT | Mod: GP | Performed by: PHYSICAL THERAPIST

## 2022-04-06 PROCEDURE — 97110 THERAPEUTIC EXERCISES: CPT | Mod: GP | Performed by: PHYSICAL THERAPIST

## 2022-04-27 ENCOUNTER — THERAPY VISIT (OUTPATIENT)
Dept: PHYSICAL THERAPY | Facility: CLINIC | Age: 42
End: 2022-04-27
Payer: COMMERCIAL

## 2022-04-27 DIAGNOSIS — M25.512 CHRONIC LEFT SHOULDER PAIN: Primary | ICD-10-CM

## 2022-04-27 DIAGNOSIS — G89.29 CHRONIC LEFT SHOULDER PAIN: Primary | ICD-10-CM

## 2022-04-27 PROCEDURE — 97110 THERAPEUTIC EXERCISES: CPT | Mod: GP | Performed by: PHYSICAL THERAPIST

## 2022-06-02 DIAGNOSIS — I10 HYPERTENSION GOAL BP (BLOOD PRESSURE) < 140/90: ICD-10-CM

## 2022-06-02 RX ORDER — AMLODIPINE BESYLATE 10 MG/1
TABLET ORAL
Qty: 90 TABLET | Refills: 1 | Status: SHIPPED | OUTPATIENT
Start: 2022-06-02 | End: 2022-12-08

## 2022-06-02 NOTE — TELEPHONE ENCOUNTER
**Patient has appointment scheduled for 6/6/22.    Routing refill request to provider for review/approval because:  Labs not current:  Creatinine  Creatinine   Date Value Ref Range Status   05/18/2021 0.87 0.66 - 1.25 mg/dL Final     Rylee Barrett RN

## 2022-06-03 DIAGNOSIS — E11.65 TYPE 2 DIABETES MELLITUS WITH HYPERGLYCEMIA, WITH LONG-TERM CURRENT USE OF INSULIN (H): ICD-10-CM

## 2022-06-03 DIAGNOSIS — Z79.4 TYPE 2 DIABETES MELLITUS WITH HYPERGLYCEMIA, WITH LONG-TERM CURRENT USE OF INSULIN (H): ICD-10-CM

## 2022-06-03 RX ORDER — LIRAGLUTIDE 6 MG/ML
INJECTION SUBCUTANEOUS
Qty: 3 ML | Refills: 1 | Status: SHIPPED | OUTPATIENT
Start: 2022-06-03 | End: 2022-06-06

## 2022-06-03 NOTE — TELEPHONE ENCOUNTER
Routing refill request to provider for review/approval because:  Labs not current:    Hemoglobin A1C POCT   Date Value Ref Range Status   05/18/2021 8.6 (H) 0 - 5.6 % Final     Comment:     Normal <5.7% Prediabetes 5.7-6.4%  Diabetes 6.5% or higher - adopted from ADA   consensus guidelines.  Results confirmed by repeat test       Hemoglobin A1C   Date Value Ref Range Status   03/04/2022 8.1 (H) 0.0 - 5.6 % Final     Comment:     Normal <5.7%   Prediabetes 5.7-6.4%    Diabetes 6.5% or higher     Note: Adopted from ADA consensus guidelines.       Creatinine   Date Value Ref Range Status   05/18/2021 0.87 0.66 - 1.25 mg/dL Final       Patient has appointment 6/7    Cordell Wallace, RN, BSN, PHN  [facility]

## 2022-06-06 ENCOUNTER — OFFICE VISIT (OUTPATIENT)
Dept: FAMILY MEDICINE | Facility: CLINIC | Age: 42
End: 2022-06-06
Payer: COMMERCIAL

## 2022-06-06 VITALS
HEIGHT: 73 IN | DIASTOLIC BLOOD PRESSURE: 80 MMHG | TEMPERATURE: 98.1 F | WEIGHT: 272 LBS | OXYGEN SATURATION: 98 % | BODY MASS INDEX: 36.05 KG/M2 | SYSTOLIC BLOOD PRESSURE: 134 MMHG | HEART RATE: 98 BPM | RESPIRATION RATE: 16 BRPM

## 2022-06-06 DIAGNOSIS — E11.65 TYPE 2 DIABETES MELLITUS WITH HYPERGLYCEMIA, WITH LONG-TERM CURRENT USE OF INSULIN (H): Primary | ICD-10-CM

## 2022-06-06 DIAGNOSIS — Z91.09 ENVIRONMENTAL ALLERGIES: ICD-10-CM

## 2022-06-06 DIAGNOSIS — E78.5 HYPERLIPIDEMIA LDL GOAL <100: ICD-10-CM

## 2022-06-06 DIAGNOSIS — Z79.4 TYPE 2 DIABETES MELLITUS WITH HYPERGLYCEMIA, WITH LONG-TERM CURRENT USE OF INSULIN (H): Primary | ICD-10-CM

## 2022-06-06 DIAGNOSIS — I10 HYPERTENSION GOAL BP (BLOOD PRESSURE) < 140/90: ICD-10-CM

## 2022-06-06 LAB — HBA1C MFR BLD: 8.3 % (ref 0–5.6)

## 2022-06-06 PROCEDURE — 80048 BASIC METABOLIC PNL TOTAL CA: CPT | Performed by: FAMILY MEDICINE

## 2022-06-06 PROCEDURE — 90471 IMMUNIZATION ADMIN: CPT | Performed by: FAMILY MEDICINE

## 2022-06-06 PROCEDURE — 36415 COLL VENOUS BLD VENIPUNCTURE: CPT | Performed by: FAMILY MEDICINE

## 2022-06-06 PROCEDURE — 90670 PCV13 VACCINE IM: CPT | Performed by: FAMILY MEDICINE

## 2022-06-06 PROCEDURE — 99214 OFFICE O/P EST MOD 30 MIN: CPT | Mod: 25 | Performed by: FAMILY MEDICINE

## 2022-06-06 PROCEDURE — 83036 HEMOGLOBIN GLYCOSYLATED A1C: CPT | Performed by: FAMILY MEDICINE

## 2022-06-06 RX ORDER — LISINOPRIL 10 MG/1
10 TABLET ORAL DAILY
Qty: 90 TABLET | Refills: 1 | Status: SHIPPED | OUTPATIENT
Start: 2022-06-06 | End: 2022-11-24

## 2022-06-06 RX ORDER — SIMVASTATIN 10 MG
10 TABLET ORAL AT BEDTIME
Qty: 90 TABLET | Refills: 3 | Status: SHIPPED | OUTPATIENT
Start: 2022-06-06 | End: 2023-09-21

## 2022-06-06 RX ORDER — LIRAGLUTIDE 6 MG/ML
1.2 INJECTION SUBCUTANEOUS DAILY
Qty: 18 ML | Refills: 1 | Status: CANCELLED | OUTPATIENT
Start: 2022-06-06

## 2022-06-06 RX ORDER — INSULIN HUMAN 100 [IU]/ML
INJECTION, SUSPENSION SUBCUTANEOUS
Qty: 75 ML | Refills: 1 | Status: SHIPPED | OUTPATIENT
Start: 2022-06-06 | End: 2023-08-14

## 2022-06-06 RX ORDER — LIRAGLUTIDE 6 MG/ML
1.8 INJECTION SUBCUTANEOUS DAILY
Qty: 15 ML | Refills: 3 | Status: SHIPPED | OUTPATIENT
Start: 2022-06-06 | End: 2023-08-14

## 2022-06-06 ASSESSMENT — PAIN SCALES - GENERAL: PAINLEVEL: NO PAIN (0)

## 2022-06-06 NOTE — PROGRESS NOTES
"  Assessment & Plan     Type 2 diabetes mellitus with hyperglycemia, with long-term current use of insulin (H)    Diabetes continues to be uncontrolled.  We will increase his Humulin 70/30 to 45 units twice daily.  We will also have him start diabetic educator and getting a continuous glucose monitor.  He will follow-up in 3 months for his    - BASIC METABOLIC PANEL; Future  - Hemoglobin A1c; Future  - BASIC METABOLIC PANEL  - Hemoglobin A1c  - metFORMIN (GLUCOPHAGE) 1000 MG tablet; TAKE 1 TABLET BY MOUTH TWICE A DAY WITH MEALS  - insulin NPH-Regular (HUMULIN MIX 70/30 KWIKPEN) susp; INJECT 45 UNITS SUB-Q BEFORE BREAKFAST AND 45 UNITS BEFORE DINNER  - AMB Adult Diabetes Educator Referral; Future  - liraglutide (VICTOZA PEN) 18 MG/3ML solution; Inject 1.8 mg Subcutaneous daily    Hyperlipidemia LDL goal <100  Who will come in fasting in 3 months we will do fasting labs at that time.  May switch to Lipitor because of simvastatin and Norvasc interaction  - simvastatin (ZOCOR) 10 MG tablet; Take 1 tablet (10 mg) by mouth At Bedtime    Hypertension goal BP (blood pressure) < 140/90    - lisinopril (ZESTRIL) 10 MG tablet; Take 1 tablet (10 mg) by mouth daily    Environmental allergies  Patient was told to stop Allegra and start Claritin.  He is to continue the Flonase and Mucinex.  I asked him to stop the DayQuil and NyQuil.  He is uncertain what he is allergic to so we will send him to an allergist  - Adult Allergy/Asthma Referral; Future    30 minutes spent on the date of the encounter doing chart review, history and exam, documentation and further activities per the note       BMI:   Estimated body mass index is 35.89 kg/m  as calculated from the following:    Height as of this encounter: 1.854 m (6' 1\").    Weight as of this encounter: 123.4 kg (272 lb).   Weight management plan: Discussed healthy diet and exercise guidelines        Return in about 3 months (around 9/6/2022) for diabetes fasting labs .    Tammy Guevara, " [facility] JERAD Guillen is a 41 year old who presents for the following health issues     History of Present Illness       Diabetes:   He presents for follow up of diabetes.  He is checking home blood glucose one time daily. He checks blood glucose before and after meals.  Blood glucose is sometimes over 200 and never under 70. When his blood glucose is low, the patient is asymptomatic for confusion, blurred vision, lethargy and reports not feeling dizzy, shaky, or weak.  He is concerned about other.  He is not experiencing numbness or burning in feet, excessive thirst, blurry vision, weight changes or redness, sores or blisters on feet.         Reason for visit:  3 month labs / allergy issues    He eats 2-3 servings of fruits and vegetables daily.He consumes 0 sweetened beverage(s) daily.He exercises with enough effort to increase his heart rate 30 to 60 minutes per day.  He exercises with enough effort to increase his heart rate 4 days per week.   He is taking medications regularly.       Metformin 1000 mg twice daily, Victoza pen 1.8 inject daily, Humulin 70/30 40 units twice daily    The patient Victoza was increased from 1.2 mg 1.8 mg daily about 3 months ago.    Lab Results   Component Value Date    A1C 8.3 06/06/2022    A1C 8.1 03/04/2022    A1C 7.7 12/03/2021    A1C 8.7 09/10/2021    A1C 8.6 05/18/2021    A1C 9.9 11/27/2020    A1C 8.9 07/16/2020    A1C 7.4 03/16/2020    A1C 10.5 12/17/2019         --- Seasonal allegies. Has been taking Mucinex, flonase, Allegra and dayquil, nyquil but continues to have sinus pressure. Stated he had Covid about 3 weeks ago.   He has used claritin in the past. He has done allegra the last 2-3 years.         Hyperlipidemia Follow-Up      Are you regularly taking any medication or supplement to lower your cholesterol?   Yes- simvastatin    Are you having muscle aches or other side effects that you think could be caused by your cholesterol  "lowering medication?  No   LDL Cholesterol Calculated   Date Value Ref Range Status   05/18/2021 134 (H) <100 mg/dL Final     Comment:     Above desirable:  100-129 mg/dl  Borderline High:  130-159 mg/dL  High:             160-189 mg/dL  Very high:       >189 mg/dl               Hypertension Follow-up      Do you check your blood pressure regularly outside of the clinic? Yes     Are you following a low salt diet? Yes    Are your blood pressures ever more than 140 on the top number (systolic) OR more   than 90 on the bottom number (diastolic), for example 140/90? Yes 130-140's/ 80-90's  Norvasc 10 mg daily and lisinopril 10 mg daily          Review of Systems   Constitutional, HEENT, cardiovascular, pulmonary, gi and gu systems are negative, except as otherwise noted.      Objective    BP (!) 146/84 (BP Location: Right arm, Patient Position: Sitting, Cuff Size: Adult Large)   Pulse 98   Temp 98.1  F (36.7  C) (Oral)   Resp 16   Ht 1.854 m (6' 1\")   Wt 123.4 kg (272 lb)   SpO2 98%   BMI 35.89 kg/m    Body mass index is 35.89 kg/m .  Physical Exam   GENERAL: healthy, alert and no distress  HENT: normal cephalic/atraumatic, ear canals and TM's normal, nasal mucosa edematous , oropharynx clear and oral mucous membranes moist  NECK: no adenopathy, no asymmetry, masses, or scars and thyroid normal to palpation  RESP: lungs clear to auscultation - no rales, rhonchi or wheezes  CV: regular rate and rhythm, normal S1 S2, no S3 or S4, no murmur, click or rub, no peripheral edema and peripheral pulses strong  MS: no gross musculoskeletal defects noted, no edema  PSYCH: mentation appears normal, affect normal/bright    Results for orders placed or performed in visit on 06/06/22 (from the past 24 hour(s))   Hemoglobin A1c   Result Value Ref Range    Hemoglobin A1C 8.3 (H) 0.0 - 5.6 %               "

## 2022-06-07 LAB
ANION GAP SERPL CALCULATED.3IONS-SCNC: 3 MMOL/L (ref 3–14)
BUN SERPL-MCNC: 16 MG/DL (ref 7–30)
CALCIUM SERPL-MCNC: 9.4 MG/DL (ref 8.5–10.1)
CHLORIDE BLD-SCNC: 110 MMOL/L (ref 94–109)
CO2 SERPL-SCNC: 26 MMOL/L (ref 20–32)
CREAT SERPL-MCNC: 0.79 MG/DL (ref 0.66–1.25)
GFR SERPL CREATININE-BSD FRML MDRD: >90 ML/MIN/1.73M2
GLUCOSE BLD-MCNC: 184 MG/DL (ref 70–99)
POTASSIUM BLD-SCNC: 4.6 MMOL/L (ref 3.4–5.3)
SODIUM SERPL-SCNC: 139 MMOL/L (ref 133–144)

## 2022-06-08 ENCOUNTER — TELEPHONE (OUTPATIENT)
Dept: FAMILY MEDICINE | Facility: CLINIC | Age: 42
End: 2022-06-08
Payer: COMMERCIAL

## 2022-06-08 NOTE — TELEPHONE ENCOUNTER
Diabetes Education Scheduling Outreach #1:    Call to patient to schedule. Left message with phone number to call to schedule.    Also sent [employer] message for second attempt. Requested patient to call to schedule.    Robyn Rodriguez OnCall  Diabetes and Nutrition Scheduling

## 2022-06-23 NOTE — PROGRESS NOTES
Discharge Note    Progress reporting period is from initial evaluation date (please see noted date below) to Apr 27, 2022.  No linked episodes      Wilmer failed to follow up and current status is unknown.  Please see information below for last relevant information on current status.  Patient seen for 3 visits.    SUBJECTIVE  Subjective changes noted by patient:  Feels the shoulder is more worked up today. Woke up with more soreness and it has remained a dull ache all day. Felt that spine manual therapy increased his soreness and did not give much benefit.  .  Current pain level is  .     Previous pain level was  2/10.   Changes in function:  Yes (See Goal flowsheet attached for changes in current functional level)  Adverse reaction to treatment or activity: None    OBJECTIVE  Changes noted in objective findings: ROM full with painful arc scaption, abduction, flexion     ASSESSMENT/PLAN  Diagnosis: L shoulder pain   Updated problem list and treatment plan:   Pain - HEP  Decreased ROM/flexibility - HEP  Decreased function - HEP  STG/LTGs have been met or progress has been made towards goals:  Yes, please see goal flowsheet for most current information  Assessment of Progress: current status is unknown.    Last current status: Pt is progressing as expected, Pt has experienced exacerbation of symptoms   Self Management Plans:  HEP  I have re-evaluated this patient and find that the nature, scope, duration and intensity of the therapy is appropriate for the medical condition of the patient.  Wilmer continues to require the following intervention to meet STG and LTG's:  HEP.    Recommendations:  Discharge with current home program.  Patient to follow up with MD as needed.    Please refer to the daily flowsheet for treatment today, total treatment time and time spent performing 1:1 timed codes.

## 2022-07-09 ENCOUNTER — HEALTH MAINTENANCE LETTER (OUTPATIENT)
Age: 42
End: 2022-07-09

## 2022-07-13 ENCOUNTER — ALLIED HEALTH/NURSE VISIT (OUTPATIENT)
Dept: EDUCATION SERVICES | Facility: CLINIC | Age: 42
End: 2022-07-13
Attending: FAMILY MEDICINE
Payer: COMMERCIAL

## 2022-07-13 DIAGNOSIS — Z79.4 TYPE 2 DIABETES MELLITUS WITH HYPERGLYCEMIA, WITH LONG-TERM CURRENT USE OF INSULIN (H): ICD-10-CM

## 2022-07-13 DIAGNOSIS — E11.65 TYPE 2 DIABETES MELLITUS WITH HYPERGLYCEMIA, WITH LONG-TERM CURRENT USE OF INSULIN (H): Primary | ICD-10-CM

## 2022-07-13 DIAGNOSIS — E11.65 TYPE 2 DIABETES MELLITUS WITH HYPERGLYCEMIA, WITH LONG-TERM CURRENT USE OF INSULIN (H): ICD-10-CM

## 2022-07-13 DIAGNOSIS — Z79.4 TYPE 2 DIABETES MELLITUS WITH HYPERGLYCEMIA, WITH LONG-TERM CURRENT USE OF INSULIN (H): Primary | ICD-10-CM

## 2022-07-13 PROCEDURE — G0108 DIAB MANAGE TRN  PER INDIV: HCPCS | Performed by: DIETITIAN, REGISTERED

## 2022-07-13 PROCEDURE — 99207 PR DROP WITH A PROCEDURE: CPT | Performed by: DIETITIAN, REGISTERED

## 2022-07-13 NOTE — LETTER
7/13/2022         RE: Wilmer Rizvi  [address] [facility] [address]  [place] [id number]        Dear Colleague,    Thank you for referring your patient, Wilmer Rizvi, to the [facility]. Please see a copy of my visit note below.    Diabetes Self-Management Education & Support    Presents for: Individual review    Type of Visit: In Person    How would patient like to obtain AVS? MyChart    ASSESSMENT:    Wilmer recently ran out of diabetes testing supplies and would like to switch to CGM. He feels like he has a good grasp on healthy eating for diabetes. We started the Dexcom G6 in clinic today. Connected patient to Dexcom clarity account. Discussed and recommended follow-up to review CGM reports and make insulin adjustments.  We also discussed switching from Victoza to Ozempic. Patient is interested, but would like to wait until next visit with PCP to discuss.     Patient's most recent   Lab Results   Component Value Date    A1C 8.3 06/06/2022    A1C 8.6 05/18/2021    is not meeting goal of <7.0    Diabetes knowledge and skills assessment:   Patient is knowledgeable in diabetes management concepts related to: Healthy Eating, Being Active, Monitoring, Taking Medication, Problem Solving, Reducing Risks and Healthy Coping    Continue education with the following diabetes management concepts: Healthy Eating, Being Active, Monitoring, Taking Medication, Problem Solving, Reducing Risks and Healthy Coping    Based on learning assessment above, most appropriate setting for further diabetes education would be: Individual setting.    INTERVENTIONS:    Education provided today on:  AADE Self-Care Behaviors:  Monitoring: purpose, proper technique, log and interpret results, individual blood glucose targets and frequency of monitoring  Taking Medication: action of prescribed medication and when to take medications  Problem Solving: high blood glucose - causes, signs/symptoms, treatment and prevention and  low blood glucose - causes, signs/symptoms, treatment and prevention    Opportunities for ongoing education and support in diabetes-self management were discussed. Pt verbalized understanding of concepts discussed and recommendations provided today.       Education Materials Provided:  No new materials provided today      PLAN    Started Dexcom G6 in clinic today with sample - routed rx for PCP to send sensor refills to pharmacy. Unsure if covered by insurance, patient is open to switching to Viviana 2 if needed.   Continue with current insulin dose  Follow-up via [employer]      Topics to cover at upcoming visits: Healthy Eating, Being Active, Monitoring, Taking Medication, Problem Solving, Reducing Risks and Healthy Coping  Follow-up: Via [employer]    See Goals Section for co-developed, patient-stated behavior change goals.  AVS provided to patient today.          SUBJECTIVE / OBJECTIVE:  Presents for: Individual review  Accompanied by: Self  Diabetes education in the past 24mo: No  Diabetes type: Type 2  Date of diagnosis: 2009  Disease course: Other  How confident are you filling out medical forms by yourself:: Extremely  Difficulty affording diabetes medication?: No  Difficulty affording diabetes testing supplies?: No  Other concerns:: None  Cultural Influences/Ethnic Background:  Choose not to answer    Diabetes Symptoms & Complications:  Fatigue: Sometimes  Neuropathy: No  Polydipsia: No  Polyphagia: No  Polyuria: No  Visual change: No  Slow healing wounds: No  Symptom course: Stable  Weight trend: Stable  Autonomic neuropathy: No  CVA: No  Heart disease: No  Nephropathy: No  Peripheral neuropathy: No  Peripheral Vascular Disease: No  Retinopathy: No  Sexual dysfunction: No    Patient Problem List and Family Medical History reviewed for relevant medical history, current medical status, and diabetes risk factors.    Vitals:  There were no vitals taken for this visit.  Estimated body mass index is 35.89 kg/m  as  "calculated from the following:    Height as of 6/6/22: 1.854 m (6' 1\").    Weight as of 6/6/22: 123.4 kg (272 lb).   Last 3 BP:   BP Readings from Last 3 Encounters:   06/06/22 134/80   03/04/22 138/88   12/03/21 136/80       History   Smoking Status     Never Smoker   Smokeless Tobacco     Never Used       Labs:  Lab Results   Component Value Date    A1C 8.3 06/06/2022    A1C 8.6 05/18/2021     Lab Results   Component Value Date     06/06/2022     05/18/2021     Lab Results   Component Value Date     05/18/2021     HDL Cholesterol   Date Value Ref Range Status   05/18/2021 33 (L) >39 mg/dL Final   ]  GFR Estimate   Date Value Ref Range Status   06/06/2022 >90 >60 mL/min/1.73m2 Final     Comment:     Effective December 21, 2021 eGFRcr in adults is calculated using the 2021 CKD-EPI creatinine equation which includes age and gender (Earnest et al., NEJ, DOI: 10.1056/TXQKyv6707092)   05/18/2021 >90 >60 mL/min/[1.73_m2] Final     Comment:     Non  GFR Calc  Starting 12/18/2018, serum creatinine based estimated GFR (eGFR) will be   calculated using the Chronic Kidney Disease Epidemiology Collaboration   (CKD-EPI) equation.       GFR Estimate If Black   Date Value Ref Range Status   05/18/2021 >90 >60 mL/min/[1.73_m2] Final     Comment:      GFR Calc  Starting 12/18/2018, serum creatinine based estimated GFR (eGFR) will be   calculated using the Chronic Kidney Disease Epidemiology Collaboration   (CKD-EPI) equation.       Lab Results   Component Value Date    CR 0.79 06/06/2022    CR 0.87 05/18/2021     No results found for: MICROALBUMIN    Healthy Eating:  Healthy Eating Assessed Today: Yes  Cultural/Holiness diet restrictions?: No  Meal planning/habits: Carb counting, Low carb, Smaller portions, Plate planning method  How many times a week on average do you eat food made away from home (restaurant/take-out)?: 1  Meals include: Breakfast, Lunch, Dinner, Evening " Snack  Breakfast: At work cafeteria: eggs, protein, sometimes with hashbrowns  Dinner: meat, cheese, fruit, vegetables OR salad with protein  Snacks: Fruit, cheese, crackers, nuts  Beverages: Water, Coffee, Diet soda  Has patient met with a dietitian in the past?: Yes    Being Active:  Being Active Assessed Today: Yes  Exercise:: Yes (walking, active at work)  Days per week of moderate to strenuous exercise (like a brisk walk): 4  On average, minutes per day of exercise at this level: 30  How intense was your typical exercise? : Moderate (like brisk walking)  Exercise Minutes per Week: 120  Barrier to exercise: Other (shoulder injury)    Monitoring:  Monitoring Assessed Today: Yes  Blood Glucose Meter: Other  Times checking blood sugar at home (number): Other  Blood glucose trend: Other    Glucose data:  Did not review current BG today      Taking Medications:  Diabetes Medication(s)     Biguanides       metFORMIN (GLUCOPHAGE) 1000 MG tablet    TAKE 1 TABLET BY MOUTH TWICE A DAY WITH MEALS    Insulin       insulin NPH-Regular (HUMULIN MIX 70/30 KWIKPEN) susp    INJECT 45 UNITS SUB-Q BEFORE BREAKFAST AND 45 UNITS BEFORE DINNER    Incretin Mimetic Agents (GLP-1 Receptor Agonists)       liraglutide (VICTOZA PEN) 18 MG/3ML solution    Inject 1.8 mg Subcutaneous daily          Taking Medication Assessed Today: Yes  Current Treatments: Insulin Injections, Oral Medication (taken by mouth)  Problems taking diabetes medications regularly?: No  Diabetes medication side effects?: No    Problem Solving:  Problem Solving Assessed Today: Yes              Reducing Risks:  Reducing Risks Assessed Today: Yes  CAD Risks: Diabetes Mellitus, Dyslipidemia, Family history  Has dilated eye exam at least once a year?: Yes  Sees dentist every 6 months?: Yes  Feet checked by healthcare provider in the last year?: Yes    Healthy Coping:  Healthy Coping Assessed Today: Yes  Informal Support system:: Family, Friends  Patient Activation Measure  Survey Score:  CLAYTON Score (Last Two) 12/21/2011   CLAYTON Raw Score 42   Activation Score 66   CLAYTON Level 3             Monserrat Avila RD LD [facility]  Time Spent: 45 minutes  Encounter Type: Individual        Any diabetes medication dose changes were made via the Certified Diabetes Care & Education Protocol in collaboration with the patient's referring provider. A copy of this encounter was shared with the provider.

## 2022-07-13 NOTE — TELEPHONE ENCOUNTER
Wilmer Arcos would benefit from continuous glucose monitoring, he is interested in the Dexcom G6 system.     Please sign pended order if you agree with plan.     Thanks!  MELBA Plata [facility]

## 2022-07-13 NOTE — PROGRESS NOTES
Diabetes Self-Management Education & Support    Presents for: Individual review    Type of Visit: In Person    How would patient like to obtain AVS? [place]    ASSESSMENT:    Wilmer recently ran out of diabetes testing supplies and would like to switch to CGM. He feels like he has a good grasp on healthy eating for diabetes. We started the Dexcom G6 in clinic today. Connected patient to Dexcom clarity account. Discussed and recommended follow-up to review CGM reports and make insulin adjustments.  We also discussed switching from Victoza to Ozempic. Patient is interested, but would like to wait until next visit with PCP to discuss.     Patient's most recent   Lab Results   Component Value Date    A1C 8.3 06/06/2022    A1C 8.6 05/18/2021    is not meeting goal of <7.0    Diabetes knowledge and skills assessment:   Patient is knowledgeable in diabetes management concepts related to: Healthy Eating, Being Active, Monitoring, Taking Medication, Problem Solving, Reducing Risks and Healthy Coping    Continue education with the following diabetes management concepts: Healthy Eating, Being Active, Monitoring, Taking Medication, Problem Solving, Reducing Risks and Healthy Coping    Based on learning assessment above, most appropriate setting for further diabetes education would be: Individual setting.    INTERVENTIONS:    Education provided today on:  AADE Self-Care Behaviors:  Monitoring: purpose, proper technique, log and interpret results, individual blood glucose targets and frequency of monitoring  Taking Medication: action of prescribed medication and when to take medications  Problem Solving: high blood glucose - causes, signs/symptoms, treatment and prevention and low blood glucose - causes, signs/symptoms, treatment and prevention    Opportunities for ongoing education and support in diabetes-self management were discussed. Pt verbalized understanding of concepts discussed and recommendations provided today.    "    Education Materials Provided:  No new materials provided today      PLAN    Started Dexcom G6 in clinic today with sample - routed rx for PCP to send sensor refills to pharmacy. Unsure if covered by insurance, patient is open to switching to Viviana 2 if needed.   Continue with current insulin dose  Follow-up via [employer]      Topics to cover at upcoming visits: Healthy Eating, Being Active, Monitoring, Taking Medication, Problem Solving, Reducing Risks and Healthy Coping  Follow-up: Via [employer] [employer]    See Goals Section for co-developed, patient-stated behavior change goals.  AVS provided to patient today.          SUBJECTIVE / OBJECTIVE:  Presents for: Individual review  Accompanied by: Self  Diabetes education in the past 24mo: No  Diabetes type: Type 2  Date of diagnosis: 2009  Disease course: Other  How confident are you filling out medical forms by yourself:: Extremely  Difficulty affording diabetes medication?: No  Difficulty affording diabetes testing supplies?: No  Other concerns:: None  Cultural Influences/Ethnic Background:  Choose not to answer    Diabetes Symptoms & Complications:  Fatigue: Sometimes  Neuropathy: No  Polydipsia: No  Polyphagia: No  Polyuria: No  Visual change: No  Slow healing wounds: No  Symptom course: Stable  Weight trend: Stable  Autonomic neuropathy: No  CVA: No  Heart disease: No  Nephropathy: No  Peripheral neuropathy: No  Peripheral Vascular Disease: No  Retinopathy: No  Sexual dysfunction: No    Patient Problem List and Family Medical History reviewed for relevant medical history, current medical status, and diabetes risk factors.    Vitals:  There were no vitals taken for this visit.  Estimated body mass index is 35.89 kg/m  as calculated from the following:    Height as of 6/6/22: 1.854 m (6' 1\").    Weight as of 6/6/22: 123.4 kg (272 lb).   Last 3 BP:   BP Readings from Last 3 Encounters:   06/06/22 134/80   03/04/22 138/88   12/03/21 136/80       History   Smoking Status     " Never Smoker   Smokeless Tobacco     Never Used       Labs:  Lab Results   Component Value Date    A1C 8.3 06/06/2022    A1C 8.6 05/18/2021     Lab Results   Component Value Date     06/06/2022     05/18/2021     Lab Results   Component Value Date     05/18/2021     HDL Cholesterol   Date Value Ref Range Status   05/18/2021 33 (L) >39 mg/dL Final   ]  GFR Estimate   Date Value Ref Range Status   06/06/2022 >90 >60 mL/min/1.73m2 Final     Comment:     Effective December 21, 2021 eGFRcr in adults is calculated using the 2021 CKD-EPI creatinine equation which includes age and gender (Earnest et al., NEJ, DOI: 10.1056/NLBGhs4305091)   05/18/2021 >90 >60 mL/min/[1.73_m2] Final     Comment:     Non  GFR Calc  Starting 12/18/2018, serum creatinine based estimated GFR (eGFR) will be   calculated using the Chronic Kidney Disease Epidemiology Collaboration   (CKD-EPI) equation.       GFR Estimate If Black   Date Value Ref Range Status   05/18/2021 >90 >60 mL/min/[1.73_m2] Final     Comment:      GFR Calc  Starting 12/18/2018, serum creatinine based estimated GFR (eGFR) will be   calculated using the Chronic Kidney Disease Epidemiology Collaboration   (CKD-EPI) equation.       Lab Results   Component Value Date    CR 0.79 06/06/2022    CR 0.87 05/18/2021     No results found for: MICROALBUMIN    Healthy Eating:  Healthy Eating Assessed Today: Yes  Cultural/Mosque diet restrictions?: No  Meal planning/habits: Carb counting, Low carb, Smaller portions, Plate planning method  How many times a week on average do you eat food made away from home (restaurant/take-out)?: 1  Meals include: Breakfast, Lunch, Dinner, Evening Snack  Breakfast: At work cafeteria: eggs, protein, sometimes with hashbrowns  Dinner: meat, cheese, fruit, vegetables OR salad with protein  Snacks: Fruit, cheese, crackers, nuts  Beverages: Water, Coffee, Diet soda  Has patient met with a dietitian in the  past?: Yes    Being Active:  Being Active Assessed Today: Yes  Exercise:: Yes (walking, active at work)  Days per week of moderate to strenuous exercise (like a brisk walk): 4  On average, minutes per day of exercise at this level: 30  How intense was your typical exercise? : Moderate (like brisk walking)  Exercise Minutes per Week: 120  Barrier to exercise: Other (shoulder injury)    Monitoring:  Monitoring Assessed Today: Yes  Blood Glucose Meter: Other  Times checking blood sugar at home (number): Other  Blood glucose trend: Other    Glucose data:  Did not review current BG today      Taking Medications:  Diabetes Medication(s)     Biguanides       metFORMIN (GLUCOPHAGE) 1000 MG tablet    TAKE 1 TABLET BY MOUTH TWICE A DAY WITH MEALS    Insulin       insulin NPH-Regular (HUMULIN MIX 70/30 KWIKPEN) susp    INJECT 45 UNITS SUB-Q BEFORE BREAKFAST AND 45 UNITS BEFORE DINNER    Incretin Mimetic Agents (GLP-1 Receptor Agonists)       liraglutide (VICTOZA PEN) 18 MG/3ML solution    Inject 1.8 mg Subcutaneous daily          Taking Medication Assessed Today: Yes  Current Treatments: Insulin Injections, Oral Medication (taken by mouth)  Problems taking diabetes medications regularly?: No  Diabetes medication side effects?: No    Problem Solving:  Problem Solving Assessed Today: Yes              Reducing Risks:  Reducing Risks Assessed Today: Yes  CAD Risks: Diabetes Mellitus, Dyslipidemia, Family history  Has dilated eye exam at least once a year?: Yes  Sees dentist every 6 months?: Yes  Feet checked by healthcare provider in the last year?: Yes    Healthy Coping:  Healthy Coping Assessed Today: Yes  Informal Support system:: Family, Friends  Patient Activation Measure Survey Score:  CLAYTON Score (Last Two) 12/21/2011   CLAYTON Raw Score 42   Activation Score 66   CLAYTON Level 3             MELBA Plata [facility]  Time Spent: 45 minutes  Encounter Type: Individual        Any diabetes medication dose changes were made via the  Certified Diabetes Care & Education Protocol in collaboration with the patient's referring provider. A copy of this encounter was shared with the provider.

## 2022-07-14 ENCOUNTER — MYC MEDICAL ADVICE (OUTPATIENT)
Dept: EDUCATION SERVICES | Facility: CLINIC | Age: 42
End: 2022-07-14

## 2022-07-14 RX ORDER — PROCHLORPERAZINE 25 MG/1
1 SUPPOSITORY RECTAL
Qty: 1 EACH | Refills: 1 | Status: SHIPPED | OUTPATIENT
Start: 2022-07-14 | End: 2022-09-15 | Stop reason: ALTCHOICE

## 2022-07-14 RX ORDER — PROCHLORPERAZINE 25 MG/1
1 SUPPOSITORY RECTAL
Qty: 3 EACH | Refills: 5 | Status: SHIPPED | OUTPATIENT
Start: 2022-07-14 | End: 2022-09-15 | Stop reason: ALTCHOICE

## 2022-07-19 ENCOUNTER — LAB (OUTPATIENT)
Dept: LAB | Facility: CLINIC | Age: 42
End: 2022-07-19

## 2022-07-19 ENCOUNTER — OFFICE VISIT (OUTPATIENT)
Dept: ALLERGY | Facility: CLINIC | Age: 42
End: 2022-07-19
Payer: COMMERCIAL

## 2022-07-19 VITALS
HEART RATE: 82 BPM | SYSTOLIC BLOOD PRESSURE: 126 MMHG | DIASTOLIC BLOOD PRESSURE: 83 MMHG | WEIGHT: 273 LBS | OXYGEN SATURATION: 96 % | BODY MASS INDEX: 36.02 KG/M2

## 2022-07-19 DIAGNOSIS — J30.9 ALLERGIC RHINITIS, UNSPECIFIED SEASONALITY, UNSPECIFIED TRIGGER: ICD-10-CM

## 2022-07-19 DIAGNOSIS — J30.9 ALLERGIC RHINITIS, UNSPECIFIED SEASONALITY, UNSPECIFIED TRIGGER: Primary | ICD-10-CM

## 2022-07-19 PROCEDURE — 86003 ALLG SPEC IGE CRUDE XTRC EA: CPT | Mod: 59

## 2022-07-19 PROCEDURE — 36415 COLL VENOUS BLD VENIPUNCTURE: CPT

## 2022-07-19 PROCEDURE — 99203 OFFICE O/P NEW LOW 30 MIN: CPT | Performed by: INTERNAL MEDICINE

## 2022-07-19 PROCEDURE — 86003 ALLG SPEC IGE CRUDE XTRC EA: CPT

## 2022-07-19 ASSESSMENT — ENCOUNTER SYMPTOMS
FACIAL SWELLING: 0
WHEEZING: 0
ARTHRALGIAS: 0
HEADACHES: 1
DIARRHEA: 0
EYE ITCHING: 0
MYALGIAS: 0
SHORTNESS OF BREATH: 0
EYE REDNESS: 0
FEVER: 0
ACTIVITY CHANGE: 0
CHEST TIGHTNESS: 0
NAUSEA: 0
JOINT SWELLING: 0
FATIGUE: 0
COUGH: 0
VOMITING: 0
RHINORRHEA: 0
ADENOPATHY: 0
EYE DISCHARGE: 0
SINUS PRESSURE: 0

## 2022-07-19 NOTE — LETTER
7/19/2022         RE: Wilmer Rizvi  [address] [facility] [address]  [place] [id number]        Dear Colleague,    Thank you for referring your patient, Wilmer Rizvi, to the [facility]TY [facility]. Please see a copy of my visit note below.    Wilmer Rizvi was seen in the Allergy Clinic at [facility].    Wilmer Rizvi is a 42 year old White male being seen today at the request of Tammy Guevara DO / [facility] in consultation for Environmental allergies.    Wilmer has symptoms of allergic rhinitis for at least 20 to 30 years.  The last year has been particularly bothersome.  He was taking Allegra for several years and Flonase together all year-round with good symptom control.  This spring he has significant uptick in his allergy symptoms including nasal congestion, sinus pressure, sneezing, cough, rhinorrhea.  He was seen and recommended to try Claritin which provided partial benefit but still is symptomatic.  He is currently back to baseline.  He approximates that was April through June where most of his symptoms were occurring.  He did develop COVID this spring.    He does not have any eye itching.  He is here to be assessed for allergic rhinitis.      Past Medical History:   Diagnosis Date     Diabetes (H) 2012     Hypertension goal BP (blood pressure) < 140/90      Family History   Problem Relation Age of Onset     Diabetes Mother      Thyroid Disease Mother      Breast Cancer Mother      Hypertension Father         Has dementia  and altheimers     Past Surgical History:   Procedure Laterality Date     SURGICAL HISTORY OF -       Groin Cyst removal 2012       ENVIRONMENTAL HISTORY:   Pets inside the house include None.  Do you smoke cigarettes or other recreational drugs? No There is/are 0 smokers living in the house. The house does not have a damp basement.     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     Review of Systems   Constitutional: Negative for activity change, fatigue and fever.   HENT: Negative for congestion, dental problem, ear pain, facial swelling, nosebleeds, postnasal drip, rhinorrhea, sinus pressure and sneezing.    Eyes: Negative for discharge, redness and itching.   Respiratory: Negative for cough, chest tightness, shortness of breath and wheezing.    Cardiovascular: Negative for chest pain.   Gastrointestinal: Negative for diarrhea, nausea and vomiting.   Musculoskeletal: Negative for arthralgias, joint swelling and myalgias.   Skin: Negative for rash.   Neurological: Positive for headaches.   Hematological: Negative for adenopathy.   Psychiatric/Behavioral: Negative for behavioral problems and self-injury.     Patient supplied answers from flow sheet for:  Allergy and Asthma Intake Questionnaire.  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 : (P) No    Nasal and Eye Symptoms   Check any nasal or sinus symptoms you have: : (P) Stuffy head or nose, Ear Pressure  Have you had Nasal polyps?: (P) No  Have you had sinus surgery?: (P) No  Have you had sinus infections?: (P) Yes  Have you used any nasal sprays?: (P) Yes  Names:: (P) Flownaise  Have you used any pills for symptoms?: (P) Yes  Names:: (P) Clariton  Have you used any eye drops: (P) No  When do nasal, sinus or eye symptoms occur?: (P) Spring, Summer, Fall, Morning, Evening, At work or school, Outdoors, Indoors  What makes nasal, sinus or eye symptoms worse?: (P) Colds, infections            Current Outpatient  Medications:      amLODIPine (NORVASC) 10 MG tablet, TAKE 1 TABLET BY MOUTH EVERY DAY, Disp: 90 tablet, Rfl: 1     BD HERRERA U/F 32G X 4 MM insulin pen needle, Use 3 pen needles daily or as directed., Disp: 300 each, Rfl: 4     Continuous Blood Gluc Sensor (DEXCOM G6 SENSOR) MISC, 1 each every 10 days Change every 10 days., Disp: 3 each, Rfl: 5     Continuous Blood Gluc Transmit (DEXCOM G6 TRANSMITTER) MISC, 1 each every 3 months Change every 3 months., Disp: 1 each, Rfl: 1     fexofenadine (ALLEGRA) 180 MG tablet, Take 180 mg by mouth daily, Disp: , Rfl:      fish oil-omega-3 fatty acids 1000 MG capsule, Take 2 g by mouth daily Take one tablet twice daily, Disp: , Rfl:      fluticasone (FLONASE) 50 MCG/ACT nasal spray, Spray 1 spray into both nostrils daily, Disp: , Rfl:      insulin NPH-Regular (HUMULIN MIX 70/30 KWIKPEN) susp, INJECT 45 UNITS SUB-Q BEFORE BREAKFAST AND 45 UNITS BEFORE DINNER, Disp: 75 mL, Rfl: 1     liraglutide (VICTOZA PEN) 18 MG/3ML solution, Inject 1.8 mg Subcutaneous daily, Disp: 15 mL, Rfl: 3     lisinopril (ZESTRIL) 10 MG tablet, Take 1 tablet (10 mg) by mouth daily, Disp: 90 tablet, Rfl: 1     metFORMIN (GLUCOPHAGE) 1000 MG tablet, TAKE 1 TABLET BY MOUTH TWICE A DAY WITH MEALS, Disp: 180 tablet, Rfl: 1     simvastatin (ZOCOR) 10 MG tablet, Take 1 tablet (10 mg) by mouth At Bedtime, Disp: 90 tablet, Rfl: 3     blood glucose (NO BRAND SPECIFIED) test strip, tid, Disp: 100 strip, Rfl: 3     blood glucose monitoring (ACCU-CHEK FASTCLIX) lancets, Take qid due to high sugars, Disp: 102 each, Rfl: 3     blood glucose monitoring (NO BRAND SPECIFIED) meter device kit, Use to test blood sugar 2 times daily or as directed., Disp: 1 kit, Rfl: 0     Blood Glucose Monitoring Suppl (ACCU-CHEK GUIDE) w/Device KIT, , Disp: , Rfl:   Allergies   Allergen Reactions     Amoxicillin Rash     Rash         EXAM:   /83   Pulse 82   Wt 123.8 kg (273 lb)   SpO2 96%   BMI 36.02 kg/m      Physical  Exam  Constitutional:       General: He is not in acute distress.     Appearance: Normal appearance. He is not ill-appearing.   HENT:      Head: Normocephalic and atraumatic.      Nose: No turbinate hypertrophy, and minimal rhinorrhea.     Mouth/Throat:      Mouth: Mucous membranes are moist.      Pharynx: Oropharynx is clear. No posterior oropharyngeal erythema.   Eyes:      General:         Right eye: No discharge.         Left eye: No discharge.   Cardiovascular:      Rate and Rhythm: Normal rate and regular rhythm.      Heart sounds: Normal heart sounds.   Pulmonary:      Effort: Pulmonary effort is normal.      Breath sounds: Normal breath sounds. No wheezing or rhonchi.   Skin:     General: Skin is warm.      Findings: No erythema or rash.   Neurological:      General: No focal deficit present.      Mental Status: He is alert. Mental status is at baseline.   Psychiatric:         Mood and Affect: Mood normal.         Behavior: Behavior normal.     WORKUP: Unable to skin test due to taking Claritin.    ASSESSMENT/PLAN:  Wilmer Rizvi is a 42 year old male seen today for allergic rhinitis evaluation.    1. Will check labs since he is taking Claritin.  2. Will make recommendations based on your labs.  3. Will consider a trial off of medications depending on lab results.      Follow-up if not improving or flares of symptoms despite medications.      Thank you for allowing me to participate in the care of Wilmer Rizvi.      A total of 30 minutes was spent on the day of the encounter performing chart review, history and exam, documentation, and counseling and coordination of care as noted above.       Eugene Kelley MD  Allergy/Immunology  [facility]        Again, thank you for allowing me to participate in the care of your patient.        Sincerely,        Eugene Kelley MD

## 2022-07-19 NOTE — PROGRESS NOTES
Wilmer Rizvi was seen in the Allergy Clinic at [facility].    Wilmer Rizvi is a 42 year old White male being seen today at the request of Tammy Guevara DO / [facility] in consultation for Environmental allergies.    Wilmer has symptoms of allergic rhinitis for at least 20 to 30 years.  The last year has been particularly bothersome.  He was taking Allegra for several years and Flonase together all year-round with good symptom control.  This spring he has significant uptick in his allergy symptoms including nasal congestion, sinus pressure, sneezing, cough, rhinorrhea.  He was seen and recommended to try Claritin which provided partial benefit but still is symptomatic.  He is currently back to baseline.  He approximates that was April through June where most of his symptoms were occurring.  He did develop COVID this spring.    He does not have any eye itching.  He is here to be assessed for allergic rhinitis.      Past Medical History:   Diagnosis Date     Diabetes (H) 2012     Hypertension goal BP (blood pressure) < 140/90      Family History   Problem Relation Age of Onset     Diabetes Mother      Thyroid Disease Mother      Breast Cancer Mother      Hypertension Father         Has dementia  and altheimers     Past Surgical History:   Procedure Laterality Date     SURGICAL HISTORY OF -       Groin Cyst removal 2012       ENVIRONMENTAL HISTORY:   Pets inside the house include None.  Do you smoke cigarettes or other recreational drugs? No There is/are 0 smokers living in the house. The house does not have a damp basement.     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     Review of Systems   Constitutional: Negative for activity change, fatigue and fever.   HENT: Negative for congestion, dental problem, ear pain, facial swelling, nosebleeds, postnasal drip, rhinorrhea, sinus pressure and sneezing.    Eyes:  Negative for discharge, redness and itching.   Respiratory: Negative for cough, chest tightness, shortness of breath and wheezing.    Cardiovascular: Negative for chest pain.   Gastrointestinal: Negative for diarrhea, nausea and vomiting.   Musculoskeletal: Negative for arthralgias, joint swelling and myalgias.   Skin: Negative for rash.   Neurological: Positive for headaches.   Hematological: Negative for adenopathy.   Psychiatric/Behavioral: Negative for behavioral problems and self-injury.     Patient supplied answers from flow sheet for:  Allergy and Asthma Intake Questionnaire.  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 : (P) No    Nasal and Eye Symptoms   Check any nasal or sinus symptoms you have: : (P) Stuffy head or nose, Ear Pressure  Have you had Nasal polyps?: (P) No  Have you had sinus surgery?: (P) No  Have you had sinus infections?: (P) Yes  Have you used any nasal sprays?: (P) Yes  Names:: (P) Flownaise  Have you used any pills for symptoms?: (P) Yes  Names:: (P) Clariton  Have you used any eye drops: (P) No  When do nasal, sinus or eye symptoms occur?: (P) Spring, Summer, Fall, Morning, Evening, At work or school, Outdoors, Indoors  What makes nasal, sinus or eye symptoms worse?: (P) Colds, infections            Current Outpatient Medications:      amLODIPine (NORVASC) 10 MG tablet, TAKE 1 TABLET BY MOUTH EVERY DAY, Disp: 90 tablet, Rfl: 1     BD HERRERA U/F 32G X 4 MM insulin pen needle, Use 3 pen needles daily or as directed., Disp: 300 each, Rfl: 4     Continuous Blood Gluc Sensor (DEXCOM G6  SENSOR) MISC, 1 each every 10 days Change every 10 days., Disp: 3 each, Rfl: 5     Continuous Blood Gluc Transmit (DEXCOM G6 TRANSMITTER) MISC, 1 each every 3 months Change every 3 months., Disp: 1 each, Rfl: 1     fexofenadine (ALLEGRA) 180 MG tablet, Take 180 mg by mouth daily, Disp: , Rfl:      fish oil-omega-3 fatty acids 1000 MG capsule, Take 2 g by mouth daily Take one tablet twice daily, Disp: , Rfl:      fluticasone (FLONASE) 50 MCG/ACT nasal spray, Spray 1 spray into both nostrils daily, Disp: , Rfl:      insulin NPH-Regular (HUMULIN MIX 70/30 KWIKPEN) susp, INJECT 45 UNITS SUB-Q BEFORE BREAKFAST AND 45 UNITS BEFORE DINNER, Disp: 75 mL, Rfl: 1     liraglutide (VICTOZA PEN) 18 MG/3ML solution, Inject 1.8 mg Subcutaneous daily, Disp: 15 mL, Rfl: 3     lisinopril (ZESTRIL) 10 MG tablet, Take 1 tablet (10 mg) by mouth daily, Disp: 90 tablet, Rfl: 1     metFORMIN (GLUCOPHAGE) 1000 MG tablet, TAKE 1 TABLET BY MOUTH TWICE A DAY WITH MEALS, Disp: 180 tablet, Rfl: 1     simvastatin (ZOCOR) 10 MG tablet, Take 1 tablet (10 mg) by mouth At Bedtime, Disp: 90 tablet, Rfl: 3     blood glucose (NO BRAND SPECIFIED) test strip, tid, Disp: 100 strip, Rfl: 3     blood glucose monitoring (ACCU-CHEK FASTCLIX) lancets, Take qid due to high sugars, Disp: 102 each, Rfl: 3     blood glucose monitoring (NO BRAND SPECIFIED) meter device kit, Use to test blood sugar 2 times daily or as directed., Disp: 1 kit, Rfl: 0     Blood Glucose Monitoring Suppl (ACCU-CHEK GUIDE) w/Device KIT, , Disp: , Rfl:   Allergies   Allergen Reactions     Amoxicillin Rash     Rash         EXAM:   /83   Pulse 82   Wt 123.8 kg (273 lb)   SpO2 96%   BMI 36.02 kg/m      Physical Exam  Constitutional:       General: He is not in acute distress.     Appearance: Normal appearance. He is not ill-appearing.   HENT:      Head: Normocephalic and atraumatic.      Nose: No turbinate hypertrophy, and minimal rhinorrhea.     Mouth/Throat:      Mouth: Mucous  membranes are moist.      Pharynx: Oropharynx is clear. No posterior oropharyngeal erythema.   Eyes:      General:         Right eye: No discharge.         Left eye: No discharge.   Cardiovascular:      Rate and Rhythm: Normal rate and regular rhythm.      Heart sounds: Normal heart sounds.   Pulmonary:      Effort: Pulmonary effort is normal.      Breath sounds: Normal breath sounds. No wheezing or rhonchi.   Skin:     General: Skin is warm.      Findings: No erythema or rash.   Neurological:      General: No focal deficit present.      Mental Status: He is alert. Mental status is at baseline.   Psychiatric:         Mood and Affect: Mood normal.         Behavior: Behavior normal.     WORKUP: Unable to skin test due to taking Claritin.    ASSESSMENT/PLAN:  Wilmer Rizvi is a 42 year old male seen today for allergic rhinitis evaluation.    1. Will check labs since he is taking Claritin.  2. Will make recommendations based on your labs.  3. Will consider a trial off of medications depending on lab results.      Follow-up if not improving or flares of symptoms despite medications.      Thank you for allowing me to participate in the care of Wilmer Rizvi.      A total of 30 minutes was spent on the day of the encounter performing chart review, history and exam, documentation, and counseling and coordination of care as noted above.       Eugene Kelley MD  Allergy/Immunology  [facility]

## 2022-07-20 LAB
CALIF WALNUT POLN IGE QN: <0.1 KU(A)/L
EAST WHITE PINE IGE QN: <0.1 KU(A)/L
SALTWORT IGE QN: <0.1 KU(A)/L
SILVER BIRCH IGE QN: 0.65 KU(A)/L
TIMOTHY IGE QN: 0.39 KU(A)/L
WHITE MULBERRY IGE QN: <0.1 KU(A)/L

## 2022-07-21 LAB
A ALTERNATA IGE QN: 0.71 KU(A)/L
A FUMIGATUS IGE QN: <0.1 KU(A)/L
C HERBARUM IGE QN: <0.1 KU(A)/L
CAT DANDER IGG QN: <0.1 KU(A)/L
CEDAR IGE QN: <0.1 KU(A)/L
COMMON RAGWEED IGE QN: <0.1 KU(A)/L
COTTONWOOD IGE QN: <0.1 KU(A)/L
D FARINAE IGE QN: <0.1 KU(A)/L
D PTERONYSS IGE QN: <0.1 KU(A)/L
DOG DANDER+EPITH IGE QN: <0.1 KU(A)/L
E PURPURASCENS IGE QN: <0.1 KU(A)/L
ENGL PLANTAIN IGE QN: <0.1 KU(A)/L
FIREBUSH IGE QN: <0.1 KU(A)/L
GIANT RAGWEED IGE QN: <0.1 KU(A)/L
GOOSEFOOT IGE QN: <0.1 KU(A)/L
JOHNSON GRASS IGE QN: <0.1 KU(A)/L
MAPLE IGE QN: <0.1 KU(A)/L
MUGWORT IGE QN: <0.1 KU(A)/L
NETTLE IGE QN: <0.1 KU(A)/L
P NOTATUM IGE QN: <0.1 KU(A)/L
RED MULBERRY IGE QN: <0.1 KU(A)/L
SHEEP SORREL IGE QN: <0.1 KU(A)/L
WHITE ASH IGE QN: <0.1 KU(A)/L
WHITE ELM IGE QN: <0.1 KU(A)/L
WHITE OAK IGE QN: 0.23 KU(A)/L
WORMWOOD IGE QN: <0.1 KU(A)/L

## 2022-07-22 ENCOUNTER — TELEPHONE (OUTPATIENT)
Dept: EDUCATION SERVICES | Facility: CLINIC | Age: 42
End: 2022-07-22

## 2022-07-22 ENCOUNTER — TELEPHONE (OUTPATIENT)
Dept: FAMILY MEDICINE | Facility: CLINIC | Age: 42
End: 2022-07-22

## 2022-07-22 DIAGNOSIS — E11.65 TYPE 2 DIABETES MELLITUS WITH HYPERGLYCEMIA, WITH LONG-TERM CURRENT USE OF INSULIN (H): Primary | ICD-10-CM

## 2022-07-22 DIAGNOSIS — Z79.4 TYPE 2 DIABETES MELLITUS WITH HYPERGLYCEMIA, WITH LONG-TERM CURRENT USE OF INSULIN (H): Primary | ICD-10-CM

## 2022-07-22 NOTE — TELEPHONE ENCOUNTER
PA Initiation    Medication: Freestyle rosa maria reader and sensors - pa pending   Insurance Company: [employer] ([facility]) - Phone [phone] Fax [phone]  Pharmacy Filling the Rx:    Filling Pharmacy Phone:    Filling Pharmacy Fax:    Start Date: 7/22/2022    Sensors key: [id number]            [place] key:[id number]

## 2022-07-22 NOTE — TELEPHONE ENCOUNTER
Dr. Guevara,    Dexcom was not covered by [employer] insurance. Please sign pended order for Viviana 2 if you agree with plan.     [facility]

## 2022-07-28 NOTE — TELEPHONE ENCOUNTER
Prior Authorization Approval    Authorization Effective Date: 7/22/2022  Authorization Expiration Date: 7/22/2023  Medication: Freestyle rosa maria reader and sensors - pa approved  Approved Dose/Quantity: 2/28ds  Reference #: [id number]   Insurance Company: [employer] ([facility]) - Phone [phone] Fax [phone]  Expected CoPay:       CoPay Card Available:      Foundation Assistance Needed:    Which Pharmacy is filling the prescription (Not needed for infusion/clinic administered):    Pharmacy Notified:    Patient Notified:

## 2022-09-03 ENCOUNTER — HEALTH MAINTENANCE LETTER (OUTPATIENT)
Age: 42
End: 2022-09-03

## 2022-09-13 ASSESSMENT — ENCOUNTER SYMPTOMS
DIZZINESS: 0
HEMATURIA: 0
MYALGIAS: 0
SHORTNESS OF BREATH: 0
FREQUENCY: 0
HEADACHES: 0
HEARTBURN: 0
HEMATOCHEZIA: 0
JOINT SWELLING: 0
ARTHRALGIAS: 0
FEVER: 0
CONSTIPATION: 0
CHILLS: 0
DIARRHEA: 0
EYE PAIN: 0
COUGH: 0
PARESTHESIAS: 0
WEAKNESS: 0
PALPITATIONS: 0
DYSURIA: 0
NAUSEA: 0
SORE THROAT: 0
ABDOMINAL PAIN: 0
NERVOUS/ANXIOUS: 0

## 2022-09-15 ENCOUNTER — OFFICE VISIT (OUTPATIENT)
Dept: FAMILY MEDICINE | Facility: CLINIC | Age: 42
End: 2022-09-15
Payer: COMMERCIAL

## 2022-09-15 VITALS
DIASTOLIC BLOOD PRESSURE: 80 MMHG | BODY MASS INDEX: 35.07 KG/M2 | RESPIRATION RATE: 20 BRPM | HEIGHT: 73 IN | WEIGHT: 264.6 LBS | SYSTOLIC BLOOD PRESSURE: 132 MMHG | OXYGEN SATURATION: 97 % | TEMPERATURE: 98.1 F | HEART RATE: 95 BPM

## 2022-09-15 DIAGNOSIS — Z00.00 ROUTINE GENERAL MEDICAL EXAMINATION AT A HEALTH CARE FACILITY: Primary | ICD-10-CM

## 2022-09-15 DIAGNOSIS — E11.65 TYPE 2 DIABETES MELLITUS WITH HYPERGLYCEMIA, WITH LONG-TERM CURRENT USE OF INSULIN (H): ICD-10-CM

## 2022-09-15 DIAGNOSIS — E78.5 HYPERLIPIDEMIA LDL GOAL <100: ICD-10-CM

## 2022-09-15 DIAGNOSIS — M25.512 CHRONIC LEFT SHOULDER PAIN: ICD-10-CM

## 2022-09-15 DIAGNOSIS — D22.5 MELANOCYTIC NEVUS OF TRUNK: ICD-10-CM

## 2022-09-15 DIAGNOSIS — Z79.4 TYPE 2 DIABETES MELLITUS WITH HYPERGLYCEMIA, WITH LONG-TERM CURRENT USE OF INSULIN (H): ICD-10-CM

## 2022-09-15 DIAGNOSIS — I10 HYPERTENSION GOAL BP (BLOOD PRESSURE) < 140/90: ICD-10-CM

## 2022-09-15 DIAGNOSIS — Z23 HIGH PRIORITY FOR 2019-NCOV VACCINE: ICD-10-CM

## 2022-09-15 DIAGNOSIS — G89.29 CHRONIC LEFT SHOULDER PAIN: ICD-10-CM

## 2022-09-15 LAB — HBA1C MFR BLD: 6.5 % (ref 0–5.6)

## 2022-09-15 PROCEDURE — 83036 HEMOGLOBIN GLYCOSYLATED A1C: CPT | Performed by: FAMILY MEDICINE

## 2022-09-15 PROCEDURE — 80061 LIPID PANEL: CPT | Performed by: FAMILY MEDICINE

## 2022-09-15 PROCEDURE — 0124A COVID-19,PF,PFIZER BOOSTER BIVALENT: CPT | Performed by: FAMILY MEDICINE

## 2022-09-15 PROCEDURE — 99207 PR FOOT EXAM NO CHARGE: CPT | Mod: 25 | Performed by: FAMILY MEDICINE

## 2022-09-15 PROCEDURE — 90686 IIV4 VACC NO PRSV 0.5 ML IM: CPT | Performed by: FAMILY MEDICINE

## 2022-09-15 PROCEDURE — 91312 COVID-19,PF,PFIZER BOOSTER BIVALENT: CPT | Performed by: FAMILY MEDICINE

## 2022-09-15 PROCEDURE — 82043 UR ALBUMIN QUANTITATIVE: CPT | Performed by: FAMILY MEDICINE

## 2022-09-15 PROCEDURE — 99396 PREV VISIT EST AGE 40-64: CPT | Mod: 25 | Performed by: FAMILY MEDICINE

## 2022-09-15 PROCEDURE — 36415 COLL VENOUS BLD VENIPUNCTURE: CPT | Performed by: FAMILY MEDICINE

## 2022-09-15 PROCEDURE — 80048 BASIC METABOLIC PNL TOTAL CA: CPT | Performed by: FAMILY MEDICINE

## 2022-09-15 PROCEDURE — 90471 IMMUNIZATION ADMIN: CPT | Performed by: FAMILY MEDICINE

## 2022-09-15 PROCEDURE — 99214 OFFICE O/P EST MOD 30 MIN: CPT | Mod: 25 | Performed by: FAMILY MEDICINE

## 2022-09-15 ASSESSMENT — ENCOUNTER SYMPTOMS
SHORTNESS OF BREATH: 0
HEMATURIA: 0
HEADACHES: 0
NERVOUS/ANXIOUS: 0
ARTHRALGIAS: 0
FREQUENCY: 0
CONSTIPATION: 0
ABDOMINAL PAIN: 0
DYSURIA: 0
WEAKNESS: 0
FEVER: 0
SORE THROAT: 0
MYALGIAS: 0
CHILLS: 0
HEARTBURN: 0
DIZZINESS: 0
DIARRHEA: 0
PARESTHESIAS: 0
JOINT SWELLING: 0
HEMATOCHEZIA: 0
PALPITATIONS: 0
EYE PAIN: 0
COUGH: 0
NAUSEA: 0

## 2022-09-15 ASSESSMENT — PAIN SCALES - GENERAL: PAINLEVEL: MILD PAIN (3)

## 2022-09-15 NOTE — PROGRESS NOTES
derSUBJECTIVE:   CC: Wilmer is an 42 year old who presents for preventative health visit.     Patient has been advised of split billing requirements and indicates understanding: Yes     Healthy Habits:     Getting at least 3 servings of Calcium per day:  Yes    Bi-annual eye exam:  Yes    Dental care twice a year:  Yes    Sleep apnea or symptoms of sleep apnea:  None    Diet:  Low salt, Diabetic and Carbohydrate counting    Frequency of exercise:  2-3 days/week    Duration of exercise:  15-30 minutes    Taking medications regularly:  Yes    Medication side effects:  None    PHQ-2 Total Score: 0    Additional concerns today:  Yes    --Follow up on shoulder pain- seems to be getting worse.  This has been an issue since 3/2022.  He went to PT and increased his strength and ROM a lot.  He will get pain at rest.  He is getting some shooting pains into the forearm.  He denies numbness or tingling.  He strengthened his rotator cuff.  He also has pain up to his neck.  He denies weakness.  The pain is not burning.  He is right handed and the pain is in left.  He denies trauma or throwing sports.      Diabetes Follow-up    How often are you checking your blood sugar? Continuous glucose monitor  What time of day are you checking your blood sugars (select all that apply)?  Before and after meals  Have you had any blood sugars above 200?  Yes spikes but not over 250's  Have you had any blood sugars below 70?  Yes as low as 50's based on CGM    What symptoms do you notice when your blood sugar is low?  None    What concerns do you have today about your diabetes? None     Do you have any of these symptoms? (Select all that apply)  No numbness or tingling in feet.  No redness, sores or blisters on feet.  No complaints of excessive thirst.  No reports of blurry vision.  No significant changes to weight.  Metformin 1000 mg twice daily, Victoza 1.8 mg subcu daily and Humulin 70/30 45 units twice daily  When he was seen 3 months ago his  Humulin was increased    Lab Results   Component Value Date    A1C 6.5 09/15/2022    A1C 8.3 06/06/2022    A1C 8.1 03/04/2022    A1C 7.7 12/03/2021    A1C 8.7 09/10/2021    A1C 8.6 05/18/2021    A1C 9.9 11/27/2020    A1C 8.9 07/16/2020    A1C 7.4 03/16/2020    A1C 10.5 12/17/2019           Hyperlipidemia Follow-Up      Are you regularly taking any medication or supplement to lower your cholesterol?   Yes- simvastatin    Are you having muscle aches or other side effects that you think could be caused by your cholesterol lowering medication?  No    Hypertension Follow-up      Do you check your blood pressure regularly outside of the clinic? Yes     Are you following a low salt diet? Yes    Are your blood pressures ever more than 140 on the top number (systolic) OR more   than 90 on the bottom number (diastolic), for example 140/90? No 130/80's  Lisinopril 10 mg daily    BP Readings from Last 2 Encounters:   09/15/22 132/80   07/19/22 126/83     Hemoglobin A1C (%)   Date Value   09/15/2022 6.5 (H)   06/06/2022 8.3 (H)   05/18/2021 8.6 (H)   11/27/2020 9.9 (H)     LDL Cholesterol Calculated (mg/dL)   Date Value   05/18/2021 134 (H)   03/16/2020 123 (H)           Today's PHQ-2 Score:   PHQ-2 ( 1999 [employer]) 9/13/2022   Q1: Little interest or pleasure in doing things 0   Q2: Feeling down, depressed or hopeless 0   PHQ-2 Score 0   PHQ-2 Total Score (12-17 Years)- Positive if 3 or more points; Administer PHQ-A if positive -   Q1: Little interest or pleasure in doing things Not at all   Q2: Feeling down, depressed or hopeless Not at all   PHQ-2 Score 0       Abuse: Current or Past(Physical, Sexual or Emotional)- No  Do you feel safe in your environment? Yes    Social History     Tobacco Use     Smoking status: Never Smoker     Smokeless tobacco: Never Used   Substance Use Topics     Alcohol use: Yes     Comment: occasional     If you drink alcohol do you typically have >3 drinks per day or >7 drinks per week? No    No  "flowsheet data found.    Last PSA: No results found for: PSA    Reviewed orders with patient. Reviewed health maintenance and updated orders accordingly - Yes  BP Readings from Last 3 Encounters:   09/15/22 132/80   07/19/22 126/83   06/06/22 134/80    Wt Readings from Last 3 Encounters:   09/15/22 120 kg (264 lb 9.6 oz)   07/19/22 123.8 kg (273 lb)   06/06/22 123.4 kg (272 lb)                    Reviewed and updated as needed this visit by clinical staff   Tobacco  Allergies  Meds   Med Hx  Surg Hx  Fam Hx  Soc Hx          Reviewed and updated as needed this visit by Provider                       Review of Systems   Constitutional: Negative for chills and fever.   HENT: Negative for congestion, ear pain, hearing loss and sore throat.    Eyes: Negative for pain and visual disturbance.   Respiratory: Negative for cough and shortness of breath.    Cardiovascular: Negative for chest pain, palpitations and peripheral edema.   Gastrointestinal: Negative for abdominal pain, constipation, diarrhea, heartburn, hematochezia and nausea.   Genitourinary: Negative for dysuria, frequency, genital sores, hematuria, impotence, penile discharge and urgency.   Musculoskeletal: Negative for arthralgias, joint swelling and myalgias.   Skin: Negative for rash.   Neurological: Negative for dizziness, weakness, headaches and paresthesias.   Psychiatric/Behavioral: Negative for mood changes. The patient is not nervous/anxious.          OBJECTIVE:   /80 (BP Location: Right arm, Patient Position: Sitting, Cuff Size: Adult Large)   Pulse 95   Temp 98.1  F (36.7  C) (Oral)   Resp 20   Ht 1.854 m (6' 1\")   Wt 120 kg (264 lb 9.6 oz)   SpO2 97%   BMI 34.91 kg/m      Physical Exam  GENERAL: healthy, alert and no distress  EYES: Eyes grossly normal to inspection, PERRL and conjunctivae and sclerae normal  HENT: ear canals and TM's normal, nose and mouth without ulcers or lesions  NECK: no adenopathy, no asymmetry, masses, or " scars and thyroid normal to palpation  RESP: lungs clear to auscultation - no rales, rhonchi or wheezes  CV: regular rate and rhythm, normal S1 S2, no S3 or S4, no murmur, click or rub, no peripheral edema and peripheral pulses strong  ABDOMEN: soft, nontender, no hepatosplenomegaly, no masses and bowel sounds normal  MS: normal muscle tone, normal range of motion and tenderness to palpation anterior ac and glenohumeral joints  SKIN: no suspicious lesions or rashes  NEURO: Normal strength and tone, mentation intact and speech normal  PSYCH: mentation appears normal, affect normal/bright    Diagnostic Test Results:  Labs reviewed in Epic  Results for orders placed or performed in visit on 09/15/22 (from the past 24 hour(s))   Hemoglobin A1c   Result Value Ref Range    Hemoglobin A1C 6.5 (H) 0.0 - 5.6 %       ASSESSMENT/PLAN:         Routine general medical examination at a health care facility  (primary encounter diagnosis)  Comment:   Plan:     (E11.65,  Z79.4) Type 2 diabetes mellitus with hyperglycemia, with long-term current use of insulin (H)  Comment: Patient is working with diabetic educator and will slightly reduce his evening insulin.  His A1c is in goal so can follow-up in 6 months  Plan: Albumin Random Urine Quantitative with Creat         Ratio, Hemoglobin A1c, Basic metabolic panel          (Ca, Cl, CO2, Creat, Gluc, K, Na, BUN), FOOT         EXAM, OFFICE/OUTPT VISIT,EST,LEVL IV            (M25.512,  G89.29) Chronic left shoulder pain  Comment: Shoulder x-ray was nonconclusive we will get a MRI.  After the MRI we will decide if he needs to see Ortho or get a steroid injection  Plan: XR Shoulder Left 2 Views, MR Shoulder Left w/o         Contrast, OFFICE/OUTPT VISIT,EST,LEVL IV            (D22.5) Melanocytic nevus of trunk  Comment: Numerous irregular moles on his back will refer to Derm  Plan: Adult Dermatology Referral, OFFICE/OUTPT         VISIT,EST,LEVL IV            (E78.5) Hyperlipidemia LDL goal  "<100  Comment: The current medical regimen is effective;  continue present plan and medications.    Plan: OFFICE/OUTPT VISIT,ESTLEVL IV            (I10) Hypertension goal BP (blood pressure) < 140/90  Comment: The current medical regimen is effective;  continue present plan and medications.    Plan: OFFICE/OUTPT VISIT,ESTLEVL IV            (Z23) High priority for 2019-nCoV vaccine  Comment:   Plan: COVID-19,PF,PFIZER BOOSTER BIVALENT 12+Yrs              COUNSELING:   Reviewed preventive health counseling, as reflected in patient instructions       Regular exercise       Healthy diet/nutrition    Estimated body mass index is 34.91 kg/m  as calculated from the following:    Height as of this encounter: 1.854 m (6' 1\").    Weight as of this encounter: 120 kg (264 lb 9.6 oz).     Weight management plan: Discussed healthy diet and exercise guidelines    He reports that he has never smoked. He has never used smokeless tobacco.      Counseling Resources:  ATP IV Guidelines  Pooled Cohorts Equation Calculator  FRAX Risk Assessment  ICSI Preventive Guidelines  Dietary Guidelines for Americans, 2010  USDA's MyPlate  ASA Prophylaxis  Lung CA Screening    Tammy Guevara DO  [facility]  "

## 2022-09-16 LAB
ANION GAP SERPL CALCULATED.3IONS-SCNC: 6 MMOL/L (ref 3–14)
BUN SERPL-MCNC: 10 MG/DL (ref 7–30)
CALCIUM SERPL-MCNC: 8.7 MG/DL (ref 8.5–10.1)
CHLORIDE BLD-SCNC: 112 MMOL/L (ref 94–109)
CHOLEST SERPL-MCNC: 112 MG/DL
CO2 SERPL-SCNC: 23 MMOL/L (ref 20–32)
CREAT SERPL-MCNC: 0.84 MG/DL (ref 0.66–1.25)
CREAT UR-MCNC: 234 MG/DL
FASTING STATUS PATIENT QL REPORTED: YES
GFR SERPL CREATININE-BSD FRML MDRD: >90 ML/MIN/1.73M2
GLUCOSE BLD-MCNC: 147 MG/DL (ref 70–99)
HDLC SERPL-MCNC: 32 MG/DL
LDLC SERPL CALC-MCNC: 60 MG/DL
MICROALBUMIN UR-MCNC: 11 MG/L
MICROALBUMIN/CREAT UR: 4.7 MG/G CR (ref 0–17)
NONHDLC SERPL-MCNC: 80 MG/DL
POTASSIUM BLD-SCNC: 4 MMOL/L (ref 3.4–5.3)
SODIUM SERPL-SCNC: 141 MMOL/L (ref 133–144)
TRIGL SERPL-MCNC: 101 MG/DL

## 2022-10-17 ENCOUNTER — MEDICAL CORRESPONDENCE (OUTPATIENT)
Dept: MRI IMAGING | Facility: CLINIC | Age: 42
End: 2022-10-17

## 2022-10-31 ENCOUNTER — ANCILLARY PROCEDURE (OUTPATIENT)
Dept: MRI IMAGING | Facility: CLINIC | Age: 42
End: 2022-10-31
Attending: FAMILY MEDICINE
Payer: COMMERCIAL

## 2022-10-31 PROCEDURE — 73221 MRI JOINT UPR EXTREM W/O DYE: CPT | Mod: LT | Performed by: RADIOLOGY

## 2022-11-01 ENCOUNTER — MYC MEDICAL ADVICE (OUTPATIENT)
Dept: FAMILY MEDICINE | Facility: CLINIC | Age: 42
End: 2022-11-01

## 2022-11-01 DIAGNOSIS — G89.29 CHRONIC LEFT SHOULDER PAIN: Primary | ICD-10-CM

## 2022-11-01 DIAGNOSIS — M25.512 CHRONIC LEFT SHOULDER PAIN: Primary | ICD-10-CM

## 2022-11-01 NOTE — TELEPHONE ENCOUNTER
Routing My Chart message to PCP    orthopedic referral pended    Cordell Wallace, RN, BSN, PHN  [facility]

## 2022-11-10 ENCOUNTER — MYC MEDICAL ADVICE (OUTPATIENT)
Dept: FAMILY MEDICINE | Facility: CLINIC | Age: 42
End: 2022-11-10

## 2022-11-10 NOTE — TELEPHONE ENCOUNTER
DIAGNOSIS: (L) Shoulder    APPOINTMENT DATE: 11/15/2022   NOTES STATUS DETAILS   OFFICE NOTE from referring provider Internal 09/15/2022 Dr Guevara [facility]    OFFICE NOTE from other specialist Internal 04/27/2022 PT [facility]   03/04/2022 Dr Guevara [facility]    DISCHARGE SUMMARY from hospital N/A    DISCHARGE REPORT from the ER N/A    OPERATIVE REPORT N/A    EMG report N/A    MEDICATION LIST N/A    MRI Internal 10/31/2022 LFT shoulder   DEXA (osteoporosis/bone health) N/A    CT SCAN N/A    XRAYS (IMAGES & REPORTS) Internal 09/15/2022 LFT shoulder

## 2022-11-10 NOTE — TELEPHONE ENCOUNTER
RN replied to patient via [employer]. See message for details.     Cordell Wallace RN, BSN, PHN  [facility]: [place]

## 2022-11-15 ENCOUNTER — OFFICE VISIT (OUTPATIENT)
Dept: ORTHOPEDICS | Facility: CLINIC | Age: 42
End: 2022-11-15
Attending: FAMILY MEDICINE
Payer: COMMERCIAL

## 2022-11-15 ENCOUNTER — PRE VISIT (OUTPATIENT)
Dept: ORTHOPEDICS | Facility: CLINIC | Age: 42
End: 2022-11-15

## 2022-11-15 DIAGNOSIS — M25.512 CHRONIC LEFT SHOULDER PAIN: ICD-10-CM

## 2022-11-15 DIAGNOSIS — G89.29 CHRONIC LEFT SHOULDER PAIN: ICD-10-CM

## 2022-11-15 PROCEDURE — 99204 OFFICE O/P NEW MOD 45 MIN: CPT | Performed by: FAMILY MEDICINE

## 2022-11-15 NOTE — LETTER
11/15/2022         RE: Wilmer Rizvi  [address]  [place] [address]        Dear Colleague,    Thank you for referring your patient, Wilmer Rizvi, to the [facility] [place]. Please see a copy of my visit note below.    CHIEF COMPLAINT:  Pain of the Left Shoulder (No specific injury, worsening over last 11 months, tried physical therapy)       HISTORY OF PRESENT ILLNESS  Mr. Rizvi is a pleasant 42 year old male who presents to clinic today with left shoulder pain.  Wilmer has shoulder pain that is longstanding, this has definitely gotten worse over the past year.  Wilmer has been managed by his primary care office for his shoulder, he has had an x-ray and an MRI, he has also been to physical therapy and ultimately his pain persists.  He now feels pain that is fairly constant, he points to the top of his shoulder, sometimes pain radiates down the shoulder, sometimes to his hand.  This is quite painful when he moves that shoulder, this is his nondominant arm.        Additional history: as documented    MEDICAL HISTORY  Patient Active Problem List   Diagnosis     Type 2 diabetes mellitus with hyperglycemia, with long-term current use of insulin (H)     Hyperlipidemia LDL goal <100     Hypertension goal BP (blood pressure) < 140/90     Environmental allergies     Morbid obesity (H)     Chronic left shoulder pain       Current Outpatient Medications   Medication Sig Dispense Refill     amLODIPine (NORVASC) 10 MG tablet TAKE 1 TABLET BY MOUTH EVERY DAY 90 tablet 1     BD HERRERA U/F 32G X 4 MM insulin pen needle Use 3 pen needles daily or as directed. 300 each 4     Continuous Blood Gluc Sensor (FREESTYLE GAYLE 2 SENSOR) MISC 1 each every 14 days Use 1 sensor every 14 days. Use to read blood sugars per 's instructions. 2 each 5     fexofenadine (ALLEGRA) 180 MG tablet Take 180 mg by mouth daily       fish oil-omega-3 fatty acids 1000 MG capsule Take 2 g by  mouth daily Take one tablet twice daily       fluticasone (FLONASE) 50 MCG/ACT nasal spray Spray 1 spray into both nostrils daily       insulin NPH-Regular (HUMULIN MIX 70/30 KWIKPEN) susp INJECT 45 UNITS SUB-Q BEFORE BREAKFAST AND 45 UNITS BEFORE DINNER 75 mL 1     liraglutide (VICTOZA PEN) 18 MG/3ML solution Inject 1.8 mg Subcutaneous daily 15 mL 3     lisinopril (ZESTRIL) 10 MG tablet Take 1 tablet (10 mg) by mouth daily 90 tablet 1     metFORMIN (GLUCOPHAGE) 1000 MG tablet TAKE 1 TABLET BY MOUTH TWICE A DAY WITH MEALS 180 tablet 1     simvastatin (ZOCOR) 10 MG tablet Take 1 tablet (10 mg) by mouth At Bedtime 90 tablet 3       Allergies   Allergen Reactions     Amoxicillin Rash     Rash       Family History   Problem Relation Age of Onset     Diabetes Mother      Thyroid Disease Mother      Breast Cancer Mother      Hypertension Father         Has dementia  and altheimers     Diabetes Father        Additional medical/Social/Surgical histories reviewed in EPIC and updated as appropriate.        PHYSICAL EXAM  General  - normal appearance, in no obvious distress  Musculoskeletal - left shoulder  - inspection: normal bone and joint alignment, no obvious deformity, no scapular winging, no AC step-off  - palpation: normal clavicle, non-tender AC  - ROM:  Cannot actively or passively flex above 100 degrees  - strength: 5/5  strength, 5/5 in all shoulder planes  - special tests:  (+) Speed's  (+) Hawkin's  (+) Gio's  Neuro  - no sensory or motor deficit, grossly normal coordination, normal muscle tone                 ASSESSMENT & PLAN  Mr. Rizvi is a 42 year old male who presents to clinic today with chronic left shoulder pain.    Reviewed his MRI the room with him, he does have a low-grade focal tear of the supraspinatus and infraspinatus, tendinosis of the biceps tendon, moderate AC joint arthritis, and findings consistent with adhesive capsulitis.    I had a good discussion with Wilmer centering around these  findings.  It does seem that his symptoms are likely secondary to adhesive capsulitis, as suggested by his clinical picture and his MRI.    I am bringing Wilmer back to clinic for a ultrasound-guided glenohumeral injection, this will be immediately followed by a manipulation with physical therapy.    Ultimately if this does not bring him any relief I may consider referring him to our surgical partners.    It was a pleasure seeing Wilmer today.    Ishmael Mccall DO, [facility]  Primary Care Sports Medicine      This note was constructed using Dragon dictation software, please excuse any minor errors in spelling, grammar, or syntax.        Again, thank you for allowing me to participate in the care of your patient.        Sincerely,        Ishmael Mccall DO

## 2022-11-15 NOTE — PROGRESS NOTES
CHIEF COMPLAINT:  Pain of the Left Shoulder (No specific injury, worsening over last 11 months, tried physical therapy)       HISTORY OF PRESENT ILLNESS  Mr. Rizvi is a pleasant 42 year old male who presents to clinic today with left shoulder pain.  Wilmer has shoulder pain that is longstanding, this has definitely gotten worse over the past year.  Wilmer has been managed by his primary care office for his shoulder, he has had an x-ray and an MRI, he has also been to physical therapy and ultimately his pain persists.  He now feels pain that is fairly constant, he points to the top of his shoulder, sometimes pain radiates down the shoulder, sometimes to his hand.  This is quite painful when he moves that shoulder, this is his nondominant arm.        Additional history: as documented    MEDICAL HISTORY  Patient Active Problem List   Diagnosis     Type 2 diabetes mellitus with hyperglycemia, with long-term current use of insulin (H)     Hyperlipidemia LDL goal <100     Hypertension goal BP (blood pressure) < 140/90     Environmental allergies     Morbid obesity (H)     Chronic left shoulder pain       Current Outpatient Medications   Medication Sig Dispense Refill     amLODIPine (NORVASC) 10 MG tablet TAKE 1 TABLET BY MOUTH EVERY DAY 90 tablet 1     BD HERRERA U/F 32G X 4 MM insulin pen needle Use 3 pen needles daily or as directed. 300 each 4     Continuous Blood Gluc Sensor (FREESTYLE GAYLE 2 SENSOR) MISC 1 each every 14 days Use 1 sensor every 14 days. Use to read blood sugars per 's instructions. 2 each 5     fexofenadine (ALLEGRA) 180 MG tablet Take 180 mg by mouth daily       fish oil-omega-3 fatty acids 1000 MG capsule Take 2 g by mouth daily Take one tablet twice daily       fluticasone (FLONASE) 50 MCG/ACT nasal spray Spray 1 spray into both nostrils daily       insulin NPH-Regular (HUMULIN MIX 70/30 KWIKPEN) susp INJECT 45 UNITS SUB-Q BEFORE BREAKFAST AND 45 UNITS BEFORE DINNER 75 mL 1     liraglutide  (VICTOZA PEN) 18 MG/3ML solution Inject 1.8 mg Subcutaneous daily 15 mL 3     lisinopril (ZESTRIL) 10 MG tablet Take 1 tablet (10 mg) by mouth daily 90 tablet 1     metFORMIN (GLUCOPHAGE) 1000 MG tablet TAKE 1 TABLET BY MOUTH TWICE A DAY WITH MEALS 180 tablet 1     simvastatin (ZOCOR) 10 MG tablet Take 1 tablet (10 mg) by mouth At Bedtime 90 tablet 3       Allergies   Allergen Reactions     Amoxicillin Rash     Rash       Family History   Problem Relation Age of Onset     Diabetes Mother      Thyroid Disease Mother      Breast Cancer Mother      Hypertension Father         Has dementia  and altheimers     Diabetes Father        Additional medical/Social/Surgical histories reviewed in [facility] and updated as appropriate.        PHYSICAL EXAM  General  - normal appearance, in no obvious distress  Musculoskeletal - left shoulder  - inspection: normal bone and joint alignment, no obvious deformity, no scapular winging, no AC step-off  - palpation: normal clavicle, non-tender AC  - ROM:  Cannot actively or passively flex above 100 degrees  - strength: 5/5  strength, 5/5 in all shoulder planes  - special tests:  (+) Speed's  (+) Hawkin's  (+) Gio's  Neuro  - no sensory or motor deficit, grossly normal coordination, normal muscle tone                 ASSESSMENT & PLAN  Mr. Rizvi is a 42 year old male who presents to clinic today with chronic left shoulder pain.    Reviewed his MRI the room with him, he does have a low-grade focal tear of the supraspinatus and infraspinatus, tendinosis of the biceps tendon, moderate AC joint arthritis, and findings consistent with adhesive capsulitis.    I had a good discussion with Wilmer centering around these findings.  It does seem that his symptoms are likely secondary to adhesive capsulitis, as suggested by his clinical picture and his MRI.    I am bringing Wilmer back to clinic for a ultrasound-guided glenohumeral injection, this will be immediately followed by a manipulation with  physical therapy.    Ultimately if this does not bring him any relief I may consider referring him to our surgical partners.    It was a pleasure seeing Wilmer today.    Ishmael Mccall DO, [facility]  Primary Care Sports Medicine      This note was constructed using Dragon dictation software, please excuse any minor errors in spelling, grammar, or syntax.

## 2022-11-17 ENCOUNTER — OFFICE VISIT (OUTPATIENT)
Dept: ORTHOPEDICS | Facility: CLINIC | Age: 42
End: 2022-11-17
Payer: COMMERCIAL

## 2022-11-17 ENCOUNTER — THERAPY VISIT (OUTPATIENT)
Dept: PHYSICAL THERAPY | Facility: CLINIC | Age: 42
End: 2022-11-17
Payer: COMMERCIAL

## 2022-11-17 DIAGNOSIS — G89.29 CHRONIC LEFT SHOULDER PAIN: Primary | ICD-10-CM

## 2022-11-17 DIAGNOSIS — M25.512 CHRONIC LEFT SHOULDER PAIN: Primary | ICD-10-CM

## 2022-11-17 PROCEDURE — 20611 DRAIN/INJ JOINT/BURSA W/US: CPT | Mod: LT | Performed by: FAMILY MEDICINE

## 2022-11-17 PROCEDURE — 97110 THERAPEUTIC EXERCISES: CPT | Mod: GP | Performed by: PHYSICAL THERAPIST

## 2022-11-17 PROCEDURE — 97140 MANUAL THERAPY 1/> REGIONS: CPT | Mod: GP | Performed by: PHYSICAL THERAPIST

## 2022-11-17 PROCEDURE — 97161 PT EVAL LOW COMPLEX 20 MIN: CPT | Mod: GP | Performed by: PHYSICAL THERAPIST

## 2022-11-17 RX ORDER — METHYLPREDNISOLONE ACETATE 80 MG/ML
80 INJECTION, SUSPENSION INTRA-ARTICULAR; INTRALESIONAL; INTRAMUSCULAR; SOFT TISSUE
Status: SHIPPED | OUTPATIENT
Start: 2022-11-17

## 2022-11-17 RX ADMIN — METHYLPREDNISOLONE ACETATE 80 MG: 80 INJECTION, SUSPENSION INTRA-ARTICULAR; INTRALESIONAL; INTRAMUSCULAR; SOFT TISSUE at 14:09

## 2022-11-17 NOTE — LETTER
11/17/2022         RE: Wilmer Rizvi  [address]  [place] [address]        Dear Colleague,    Thank you for referring your patient, Wilmer Rizvi, to the [facility] [place]. Please see a copy of my visit note below.    Wilmer has adhesive capsulitis of the left shoulder.  He is here for an injection on his left shoulder, he has physical therapy immediately to follow for mobilization.            Large Joint Injection/Arthocentesis: L glenohumeral joint    Date/Time: 11/17/2022 2:09 PM  Performed by: Ishmael Mccall DO  Authorized by: Ishmael Mccall DO     Indications:  Pain  Needle Size:  22 G  Guidance: ultrasound    Location:  Shoulder      Site:  L glenohumeral joint  Medications:  80 mg methylPREDNISolone 80 MG/ML  Outcome:  Tolerated well, no immediate complications  Procedure discussed: discussed risks, benefits, and alternatives    Consent Given by:  Patient  Timeout: timeout called immediately prior to procedure    Prep: patient was prepped and draped in usual sterile fashion       Glenohumeral Injection - Ultrasound Guided  The patient was informed of the risks and the benefits of the procedure and a written consent was signed.  The patient s left shoulder was prepped with chlorhexidine in sterile fashion.   80 mg of methylprednisolone suspension was drawn up into a 5 mL syringe with 3 mL of 1% lidocaine w/o Epi.  Injection was performed using sterile technique.  Under ultrasound guidance a 3.5-inch 22-gauge needle was used to enter the glenohumeral joint.  Posterior approach was used with the patient in lateral recumbent position, arm in neutral position at the side.  Needle placement was visualized and documented with ultrasound.  Ultrasound visualization was necessary due to the small joint space entered.  Injection performed long axis to the probe.  Injection solution visualized within the joint space.  Images were permanently stored for the  patient's record.  There were no complications. The patient tolerated the procedure well. There was negligible bleeding.   Therapy scheduled to follow for mobilization.              Again, thank you for allowing me to participate in the care of your patient.        Sincerely,        Ishmael Mccall, DO

## 2022-11-17 NOTE — PROGRESS NOTES
[facility] Initial Evaluation -- Upper Extremity    Evaluation Date: November 17, 2022  Wilmer Rizvi is a 42 year old male with a L shoulder condition.   Referral: Dr. Mccall  Work mechanical stresses: computer work, driving, lifting/carrying, prolonged sitting,prolonged standing, push/pull, repetitive tasks  Employment status: full time  Leisure mechanical stresses: lifting weights (no longer lifting due to his shoulder  Functional disability score: SPADI 60  VAS score (0-10): 1-2/10 annoyance s/p injection; at worst before injection 8/10  Patient goals/expectations:  Alleviate shoulder pain, regain motion so able to use L shoulder     HISTORY:    Present symptoms: L shoulder pain; may feel symptoms in the neck, UT down R UE into thumb.  Symptoms are mostly concentrated about the L shoulder  Pain quality (sharp/shooting/stabbing/aching/burning/cramping):  Ache, sharp, shooting    Present since (onset date): January 2022    Symptoms (improving/unchanging/worsening):  worsening.      Symptoms commenced as a result of: lifting a box out away from his body and the L shoulder gave way     Symptoms at onset: L shoulder   Paresthesia (yes/no):  no  Spinal history: no      Constant symptoms: L shoulder  Intermittent symptoms:L neck, UT, L UE    Symptoms are worse with the following: reaching overhead, out to the side, behind her back, dressing, washing his hair, laying on the L side  Symptoms are better with the following: not using the L UE    Continued use makes the pain (better/worse/no effect): worse    Disturbed night (yes/no): yes      Pain at rest (yes/no):  yes      Previous episodes: L shoulder pain started in about November 2021  Previous treatments: PT end of March/April 2022 without relief; Cortisone injection today prior to PT    Specific Questions:  General health (excellent/good/fair/poor):  good  Pertinent medical history includes: Diabetes  Medications  (nil/NSAIDS/analg/steroids/anticoag/other):  OTC analgesic  Medical allergies:  Amoxicillin  Imaging (none/Xray/MRI/other): X-rays,  MRI - IMPRESSION:  1. Low grade small focal tear of the supraspinatus and infraspinatus  without high-grade tear.   2. Finding equivocal for clinical entity of adhesive capsulitis.  3. Tendinosis of the intra-articular long bicipital tendon.  4. Query posterior labral tear.  5. Moderate acromioclavicular joint degenerative change.  6. Prominent red marrow, nonspecific but can be seen in a setting of  anemia of chronic disease, obesity, smoking and other etiologies.  Recent or major surgery (yes/no):  no  Night pain (yes/no):  no  Accidents (yes/no):  No  Unexplained weight loss (yes/no):  no  Barriers at home: no  Other red flags: no    EXAMINATION    Posture:  Sitting (good/fair/poor): fair    Correction of Posture (better/worse/no effect/NA): felt good but did not alter symptoms  Standing (good/fair/poor): fair/good  Other observations:  Increased kyphosis upper thoracic region    Neurological: (NA/motor/sensory/reflexes/dural): not assessed    Baselines (pain or functional activity): ROM    Extremities (Hip / Knee / Ankle / Foot): L shoulder - all motions limited by pain; full active R shoulder ROM      Movement Loss Ming Mod Min Nil L shoulder   Pain with all motions   Flexion     108   Extension     51   Abduction     125 starts to moves into scaption plane at about 90 deg of abd   Adduction     Min loss across body   Internal Rotation     Lateral left hip   External Rotation     35 deg   Dorsiflexion        Plantarflexion        Inversion        Eversion          Passive Movement (+/- over pressure)/(PDM/ERP):  Flexion 112, abd 90 deg  Resisted Test Response (pain): moderate pain with resisted shoulder flexion, abduction and ER.  Minimal pain with resisted IR, (+/- pain) with Ext and adduction  Other Tests: (+) impingement testing - Au Gen    Spine:  Movement loss: mod  loss neck extension  Effect of repeated movements: repeated retraction NE on shoulder pain or ROM; repeated retraction extension loosened up his neck but no effect for shoulder ROM  Effect of static positioning: no change  Spine testing (not relevant/relevant/secondary problem): secondary to not relevant     Baseline Symptoms: 1-2/10 annoyance - L shoulder  Repeated Tests Symptom Response Mechanical Response   Active/Passive movement, resisted test, functional test During -  Produce, Abolish, Increase, Decrease, NE After -  Better, Worse, NB, NW, NE Effect -   ? or ? ROM, strength or key functional test No   Effect   Passive shoulder extension  Increase    No Worse     x                        Effect of static positioning       Shoulder mobilization - posterior glides and inferior glides Increase    No Worse    Flexion 110  Abduction 131 with improved quality      Provisional Classification (Extremity/Spine):  Extremity - Inconclusive/Other - Soft Tissue Disease Process  (adhesive capsulitis)    Principle of Management:   Education:  Discussed proper sitting posture and can trial use of rolled towel along thoracic spine.  We discussed stages of a frozen shoulder as patient was inquiring why his shoulder would have gotten more painful and motion became more limited with time.  Patient to work to the point of strain with his exercises but not to make symptoms worse after his ROM.  Working on remodeling and will gradually work up to 100 stretches per day.      Equipment provided:  none  Exercise and dosage:  Neck retraction extension 10 reps, 4-6 times a day, shoulder AA extension 10 reps 4-6 times a day.  When plateaus, add passive IR behind back with towel/or belt.    ASSESSMENT/PLAN:    Patient is a 42 year old male with left side shoulder complaints.    Patient has the following significant findings with corresponding treatment plan.                Diagnosis 1:  L shoulder pain, adhesive capsulitis    Pain -   manual therapy, self management, education, directional preference exercise, home program and modalities if needed  Decreased ROM/flexibility - manual therapy, therapeutic exercise and home program  Decreased joint mobility - manual therapy, therapeutic exercise and home program  Impaired muscle performance - neuro re-education and home program  Decreased function - therapeutic activities and home program  Impaired posture - neuro re-education, therapeutic activities and home program    Therapy Evaluation Codes:   1) History comprised of:   Personal factors that impact the plan of care:      None.    Comorbidity factors that impact the plan of care are:      Diabetes.     Medications impacting care: medication for diabetes and pain medication.  2) Examination of Body Systems comprised of:   Body structures and functions that impact the plan of care:      Shoulder.   Activity limitations that impact the plan of care are:      Bathing, Dressing and reaching, laying on the L shoulder.  3) Clinical presentation characteristics are:   Evolving/Changing.  4) Decision-Making    Moderate complexity using standardized patient assessment instrument and/or measureable assessment of functional outcome.  Cumulative Therapy Evaluation is: Low complexity.    Previous and current functional limitations:  (See Goal Flow Sheet for this information)    Short term and Long term goals: (See Goal Flow Sheet for this information)     Communication ability:  Patient appears to be able to clearly communicate and understand verbal and written communication and follow directions correctly.  Treatment Explanation - The following has been discussed with the patient:   RX ordered/plan of care  Anticipated outcomes  Possible risks and side effects  This patient would benefit from PT intervention to resume normal activities.   Rehab potential is good.    Frequency:  1 X week, once daily  Duration:  for 12 weeks  Discharge Plan:  Achieve all  LTG.  Independent in home treatment program.  Reach maximal therapeutic benefit.    Please refer to the daily flowsheet for treatment today, total treatment time and time spent performing 1:1 timed codes.     Answers for HPI/ROS submitted by the patient on 11/17/2022  Reason for Visit:: Frozen Left Shoulder Eval post shot  When problem began:: 7/17/2021  How problem occurred:: unknown  Number scale: 5/10  General health as reported by patient: good  Please check all that apply to your current or past medical history: diabetes  Medical allergies: none  Surgeries: other  Other Surgery Detail: Cyst removal 2010 in groin  Medications you are currently taking: pain medication  What are your primary job tasks: computer work, driving, lifting/carrying, prolonged sitting, prolonged standing, pushing/pulling, repetitive tasks

## 2022-11-17 NOTE — PROGRESS NOTES
Large Joint Injection/Arthocentesis: L glenohumeral joint    Date/Time: 11/17/2022 2:09 PM  Performed by: Ishmael Mccall DO  Authorized by: Ishmael Mccall DO     Indications:  Pain  Needle Size:  22 G  Guidance: ultrasound    Location:  Shoulder      Site:  L glenohumeral joint  Medications:  80 mg methylPREDNISolone 80 MG/ML  Outcome:  Tolerated well, no immediate complications  Procedure discussed: discussed risks, benefits, and alternatives    Consent Given by:  Patient  Timeout: timeout called immediately prior to procedure    Prep: patient was prepped and draped in usual sterile fashion       Glenohumeral Injection - Ultrasound Guided  The patient was informed of the risks and the benefits of the procedure and a written consent was signed.  The patient s left shoulder was prepped with chlorhexidine in sterile fashion.   80 mg of methylprednisolone suspension was drawn up into a 5 mL syringe with 3 mL of 1% lidocaine w/o Epi.  Injection was performed using sterile technique.  Under ultrasound guidance a 3.5-inch 22-gauge needle was used to enter the glenohumeral joint.  Posterior approach was used with the patient in lateral recumbent position, arm in neutral position at the side.  Needle placement was visualized and documented with ultrasound.  Ultrasound visualization was necessary due to the small joint space entered.  Injection performed long axis to the probe.  Injection solution visualized within the joint space.  Images were permanently stored for the patient's record.  There were no complications. The patient tolerated the procedure well. There was negligible bleeding.   Therapy scheduled to follow for mobilization.

## 2022-11-17 NOTE — NURSING NOTE
Chief Complaint   Patient presents with     Left Shoulder - Pain, Follow Up       There were no vitals filed for this visit.    There is no height or weight on file to calculate BMI.      GALI Alva NREMT

## 2022-11-17 NOTE — PROGRESS NOTES
Wilmer has adhesive capsulitis of the left shoulder.  He is here for an injection on his left shoulder, he has physical therapy immediately to follow for mobilization.

## 2022-11-17 NOTE — NURSING NOTE
[facility]   ORTHOPEDICS & SPORTS MEDICINE  [address] [place] [address]  Dept: [phone]  ______________________________________________________________________________    Patient: Wilmer Rizvi   : 1980   MRN: [id number]   2022    INVASIVE PROCEDURE SAFETY CHECKLIST    Date: 2022    Procedure: Left shoulder injection  Patient Name: Wilmer Rizvi  MRN: [id number]  YOB: 1980    Action: Complete sections as appropriate. Any discrepancy results in a HARD COPY until resolved.     PRE PROCEDURE:  Patient ID verified with 2 identifiers (name and  or MRN): Yes  Procedure and site verified with patient/designee (when able): Yes  Accurate consent documentation in medical record: Yes  H&P (or appropriate assessment) documented in medical record: Yes  H&P must be up to 20 days prior to procedure and updates within 24 hours of procedure as applicable: NA  Relevant diagnostic and radiology test results appropriately labeled and displayed as applicable: NA  Procedure site(s) marked with provider initials: NA    TIMEOUT:  Time-Out performed immediately prior to starting procedure, including verbal and active participation of all team members addressing the following:Yes  * Correct patient identify  * Confirmed that the correct side and site are marked  * An accurate procedure consent form  * Agreement on the procedure to be done  * Correct patient position  * Relevant images and results are properly labeled and appropriately displayed  * The need to administer antibiotics or fluids for irrigation purposes during the procedure as applicable   * Safety precautions based on patient history or medication use    DURING PROCEDURE: Verification of correct person, site, and procedures any time the responsibility for care of the patient is transferred to another member of the care team.       Prior to injection, verified patient identity using patient's name and date of  birth.  Due to injection administration, patient instructed to remain in clinic for 15 minutes  afterwards, and to report any adverse reaction to me immediately.    Joint injection was performed.      Drug Amount Wasted:  None.  Vial/Syringe: Single dose vial  Expiration Date:  9/30/2023      Artie Sheffield, EMT  November 17, 2022

## 2022-11-23 DIAGNOSIS — I10 HYPERTENSION GOAL BP (BLOOD PRESSURE) < 140/90: ICD-10-CM

## 2022-11-24 RX ORDER — LISINOPRIL 10 MG/1
10 TABLET ORAL DAILY
Qty: 90 TABLET | Refills: 1 | Status: SHIPPED | OUTPATIENT
Start: 2022-11-24 | End: 2023-09-21

## 2022-12-07 DIAGNOSIS — I10 HYPERTENSION GOAL BP (BLOOD PRESSURE) < 140/90: ICD-10-CM

## 2022-12-08 RX ORDER — AMLODIPINE BESYLATE 10 MG/1
TABLET ORAL
Qty: 90 TABLET | Refills: 1 | Status: SHIPPED | OUTPATIENT
Start: 2022-12-08 | End: 2023-09-21

## 2022-12-13 ENCOUNTER — THERAPY VISIT (OUTPATIENT)
Dept: PHYSICAL THERAPY | Facility: CLINIC | Age: 42
End: 2022-12-13
Payer: COMMERCIAL

## 2022-12-13 DIAGNOSIS — M25.512 CHRONIC LEFT SHOULDER PAIN: Primary | ICD-10-CM

## 2022-12-13 DIAGNOSIS — S46.912A STRAIN OF LEFT SHOULDER, INITIAL ENCOUNTER: ICD-10-CM

## 2022-12-13 DIAGNOSIS — G89.29 CHRONIC LEFT SHOULDER PAIN: Primary | ICD-10-CM

## 2022-12-13 PROCEDURE — 97140 MANUAL THERAPY 1/> REGIONS: CPT | Mod: GP | Performed by: PHYSICAL THERAPY ASSISTANT

## 2022-12-13 PROCEDURE — 97110 THERAPEUTIC EXERCISES: CPT | Mod: GP | Performed by: PHYSICAL THERAPY ASSISTANT

## 2022-12-27 ENCOUNTER — THERAPY VISIT (OUTPATIENT)
Dept: PHYSICAL THERAPY | Facility: CLINIC | Age: 42
End: 2022-12-27
Payer: COMMERCIAL

## 2022-12-27 DIAGNOSIS — G89.29 CHRONIC LEFT SHOULDER PAIN: Primary | ICD-10-CM

## 2022-12-27 DIAGNOSIS — M25.512 CHRONIC LEFT SHOULDER PAIN: Primary | ICD-10-CM

## 2022-12-27 PROCEDURE — 97110 THERAPEUTIC EXERCISES: CPT | Mod: GP | Performed by: PHYSICAL THERAPIST

## 2023-01-05 ENCOUNTER — THERAPY VISIT (OUTPATIENT)
Dept: PHYSICAL THERAPY | Facility: CLINIC | Age: 43
End: 2023-01-05
Payer: COMMERCIAL

## 2023-01-05 DIAGNOSIS — G89.29 CHRONIC LEFT SHOULDER PAIN: Primary | ICD-10-CM

## 2023-01-05 DIAGNOSIS — M25.512 CHRONIC LEFT SHOULDER PAIN: Primary | ICD-10-CM

## 2023-01-05 PROCEDURE — 97110 THERAPEUTIC EXERCISES: CPT | Mod: GP | Performed by: PHYSICAL THERAPIST

## 2023-01-05 PROCEDURE — 97140 MANUAL THERAPY 1/> REGIONS: CPT | Mod: GP | Performed by: PHYSICAL THERAPIST

## 2023-01-12 ENCOUNTER — THERAPY VISIT (OUTPATIENT)
Dept: PHYSICAL THERAPY | Facility: CLINIC | Age: 43
End: 2023-01-12
Payer: COMMERCIAL

## 2023-01-12 DIAGNOSIS — G89.29 CHRONIC LEFT SHOULDER PAIN: Primary | ICD-10-CM

## 2023-01-12 DIAGNOSIS — M25.512 CHRONIC LEFT SHOULDER PAIN: Primary | ICD-10-CM

## 2023-01-12 PROCEDURE — 97140 MANUAL THERAPY 1/> REGIONS: CPT | Mod: GP | Performed by: PHYSICAL THERAPIST

## 2023-01-12 PROCEDURE — 97110 THERAPEUTIC EXERCISES: CPT | Mod: GP | Performed by: PHYSICAL THERAPIST

## 2023-01-12 NOTE — PROGRESS NOTES
PROGRESS  REPORT    Progress reporting period is from 11/17/2022 to 1/12/2023.       SUBJECTIVE  Overall, patient feels that his left shoulder is improving.  He is having less pain at rest, most of the time, and does feel his motion is improving.  Reaching behind his back is most limited motion.  Patient relates that he had symptoms to the L of the spine with his pendulum one time over the past week, which has not happened in the past; without lingering symptoms.  He also had a sharp pain with trying to take his shirt off overhead on 1/10; no lingering symptoms.  Overall, does feel better.    Current Pain level: 3/10.     Initial Pain level: 8/10.   Changes in function:  Yes (See Goal flowsheet attached for changes in current functional level)  Adverse reaction to treatment or activity: None    OBJECTIVE  Changes noted in objective findings:  Yes, improved ROM, improving function  Objective:   AROM flexion 148, Abd 178 with some substitution, ER 58 (still challenging to keep elbow bent during this motion),  lateral L glute then moves up laterally with pain.   At eval flex 108, abd 125 with substitution starting at 90 deg, ER 35, IR to lateral left hip  Patient will gain motion reaching behind his back with the shoulder feeling less tight after ROM for the neck (ext), shoulder extension, strengthening     ASSESSMENT/PLAN  Updated problem list and treatment plan: Diagnosis 1:  L shoulder tightness/adhesive capsulitis    Pain -  manual therapy, self management, education, directional preference exercise and home program  Decreased ROM/flexibility - manual therapy, therapeutic exercise and home program  Decreased joint mobility - manual therapy, therapeutic exercise and home program  Decreased strength - therapeutic exercise, therapeutic activities and home program  Impaired muscle performance - neuro re-education and home program  Decreased function - therapeutic activities and home program  Impaired posture - neuro  re-education, therapeutic activities and home program  STG/LTGs have been met or progress has been made towards goals:  Yes (See Goal flow sheet completed today.)  Assessment of Progress: The patient's condition is improving.  Self Management Plans:  Patient has been instructed in a home treatment program.  Patient  has been instructed in self management of symptoms.  I have re-evaluated this patient and find that the nature, scope, duration and intensity of the therapy is appropriate for the medical condition of the patient.  Wilmer continues to require the following intervention to meet STG and LTG's:  PT    Recommendations:  This patient would benefit from continued therapy.     Frequency:  1 X week, once daily  Duration:  for 8 weeks        Please refer to the daily flowsheet for treatment today, total treatment time and time spent performing 1:1 timed codes.

## 2023-01-19 ENCOUNTER — THERAPY VISIT (OUTPATIENT)
Dept: PHYSICAL THERAPY | Facility: CLINIC | Age: 43
End: 2023-01-19
Payer: COMMERCIAL

## 2023-01-19 DIAGNOSIS — G89.29 CHRONIC LEFT SHOULDER PAIN: Primary | ICD-10-CM

## 2023-01-19 DIAGNOSIS — S46.912A STRAIN OF LEFT SHOULDER, INITIAL ENCOUNTER: ICD-10-CM

## 2023-01-19 DIAGNOSIS — M25.512 CHRONIC LEFT SHOULDER PAIN: Primary | ICD-10-CM

## 2023-01-19 PROCEDURE — 97110 THERAPEUTIC EXERCISES: CPT | Mod: GP | Performed by: PHYSICAL THERAPIST

## 2023-01-19 PROCEDURE — 97140 MANUAL THERAPY 1/> REGIONS: CPT | Mod: GP | Performed by: PHYSICAL THERAPIST

## 2023-01-26 ENCOUNTER — THERAPY VISIT (OUTPATIENT)
Dept: PHYSICAL THERAPY | Facility: CLINIC | Age: 43
End: 2023-01-26
Payer: COMMERCIAL

## 2023-01-26 DIAGNOSIS — M25.512 CHRONIC LEFT SHOULDER PAIN: Primary | ICD-10-CM

## 2023-01-26 DIAGNOSIS — G89.29 CHRONIC LEFT SHOULDER PAIN: Primary | ICD-10-CM

## 2023-01-26 PROCEDURE — 97110 THERAPEUTIC EXERCISES: CPT | Mod: GP | Performed by: PHYSICAL THERAPIST

## 2023-01-26 PROCEDURE — 97530 THERAPEUTIC ACTIVITIES: CPT | Mod: GP | Performed by: PHYSICAL THERAPIST

## 2023-02-09 ENCOUNTER — THERAPY VISIT (OUTPATIENT)
Dept: PHYSICAL THERAPY | Facility: CLINIC | Age: 43
End: 2023-02-09
Payer: COMMERCIAL

## 2023-02-09 DIAGNOSIS — M25.512 CHRONIC LEFT SHOULDER PAIN: Primary | ICD-10-CM

## 2023-02-09 DIAGNOSIS — G89.29 CHRONIC LEFT SHOULDER PAIN: Primary | ICD-10-CM

## 2023-02-09 PROCEDURE — 97110 THERAPEUTIC EXERCISES: CPT | Mod: GP | Performed by: PHYSICAL THERAPIST

## 2023-02-16 ENCOUNTER — THERAPY VISIT (OUTPATIENT)
Dept: PHYSICAL THERAPY | Facility: CLINIC | Age: 43
End: 2023-02-16
Payer: COMMERCIAL

## 2023-02-16 DIAGNOSIS — M25.512 CHRONIC LEFT SHOULDER PAIN: Primary | ICD-10-CM

## 2023-02-16 DIAGNOSIS — G89.29 CHRONIC LEFT SHOULDER PAIN: Primary | ICD-10-CM

## 2023-02-16 PROCEDURE — 97110 THERAPEUTIC EXERCISES: CPT | Mod: GP | Performed by: PHYSICAL THERAPIST

## 2023-02-25 DIAGNOSIS — E11.65 TYPE 2 DIABETES MELLITUS WITH HYPERGLYCEMIA, WITH LONG-TERM CURRENT USE OF INSULIN (H): ICD-10-CM

## 2023-02-25 DIAGNOSIS — Z79.4 TYPE 2 DIABETES MELLITUS WITH HYPERGLYCEMIA, WITH LONG-TERM CURRENT USE OF INSULIN (H): ICD-10-CM

## 2023-03-02 ENCOUNTER — THERAPY VISIT (OUTPATIENT)
Dept: PHYSICAL THERAPY | Facility: CLINIC | Age: 43
End: 2023-03-02
Payer: COMMERCIAL

## 2023-03-02 DIAGNOSIS — M25.512 CHRONIC LEFT SHOULDER PAIN: Primary | ICD-10-CM

## 2023-03-02 DIAGNOSIS — G89.29 CHRONIC LEFT SHOULDER PAIN: Primary | ICD-10-CM

## 2023-03-02 PROCEDURE — 97110 THERAPEUTIC EXERCISES: CPT | Mod: GP | Performed by: PHYSICAL THERAPIST

## 2023-03-16 ENCOUNTER — THERAPY VISIT (OUTPATIENT)
Dept: PHYSICAL THERAPY | Facility: CLINIC | Age: 43
End: 2023-03-16
Payer: COMMERCIAL

## 2023-03-16 DIAGNOSIS — G89.29 CHRONIC LEFT SHOULDER PAIN: Primary | ICD-10-CM

## 2023-03-16 DIAGNOSIS — M25.512 CHRONIC LEFT SHOULDER PAIN: Primary | ICD-10-CM

## 2023-03-16 PROCEDURE — 97110 THERAPEUTIC EXERCISES: CPT | Mod: GP | Performed by: PHYSICAL THERAPIST

## 2023-03-16 PROCEDURE — 97112 NEUROMUSCULAR REEDUCATION: CPT | Mod: GP | Performed by: PHYSICAL THERAPIST

## 2023-03-16 NOTE — PROGRESS NOTES
PROGRESS  REPORT    Progress reporting period is from 1/12/2023 to 3/16/2023.       SUBJECTIVE  Patient relates that he is 80% improved with regards to the L shoulder.  He is not experiencing pain at rest for the neck or shoulder.  He has had times when he will feel L UT/shoulder symptoms and will have to hold on shoulder strengthening and complete his neck home exercise program to alleviate the symptoms.  Overall, significant improvement in ROM for the L shoulder and increased ease with use of the shoulder/UE with all activity.  Has not returned to the gym.  Does feel that he needs to gain endurance for the shoulder, further ROM and maintain painfree status with the use of his neck exercises.   Most difficult activity is reaching behind his back.  Current Pain level: 0/10 (at worst 3/10 over past week).     Previous pain level was  3/10   Initial Pain level: 8/10.   Changes in function:  Yes (See Goal flowsheet attached for changes in current functional level)  Adverse reaction to treatment or activity: None    OBJECTIVE  Changes noted in objective findings:  Yes, improved shoulder ROM, improved posture and improved function  Objective:   AROM Flexion 154 (much less scaption), Abd 170 with much improved quality of motion, ER 65 (difficult to keep elbow bent during motion), IR T 12, Ext 58,   At last PN Flex 148, Abd 178 with some substitution, ER 58 (challenging to keep elbow bent during this motion),  lateral L glute then moves up laterally with pain.  Posture-good in standing, good/fair in sitting - able to self correct to good without cues.  Patient does require cueing for scapula stabilization exercises - tends to shrug shoulder/over-use of UT; cues needed to work proper musculature - verbal and tactile  Neck exercises (lateral bend to the L) will alleviate any shoulder pain that he developers (no longer feeling symptoms down into the L UE/forearm.     SPADI 10% improved from 39.23      ASSESSMENT/PLAN  Updated  problem list and treatment plan: Diagnosis 1:  L shoulder adhesive capsulitis ; does appear to have a cervical component.   Pain -  manual therapy, self management, education, directional preference exercise and home program  Decreased ROM/flexibility - manual therapy, therapeutic exercise and home program  Decreased joint mobility - manual therapy, therapeutic exercise and home program  Impaired muscle performance - neuro re-education and home program  Decreased function - therapeutic activities and home program  Impaired posture - neuro re-education, therapeutic activities and home program  STG/LTGs have been met or progress has been made towards goals:  Yes (See Goal flow sheet completed today.)  Assessment of Progress: The patient's condition is improving.  The patient's condition has potential to improve.  Patient is meeting short term goals and is progressing towards long term goals.  Self Management Plans:  Patient has been instructed in a home treatment program.  Patient  has been instructed in self management of symptoms.  I have re-evaluated this patient and find that the nature, scope, duration and intensity of the therapy is appropriate for the medical condition of the patient.  Wilmer continues to require the following intervention to meet STG and LTG's:  PT    Recommendations:  This patient would benefit from continued therapy.     Frequency:  1 X week, once daily  Duration:  for 4 weeks tapering to every other week over 4 weeks        Please refer to the daily flowsheet for treatment today, total treatment time and time spent performing 1:1 timed codes.

## 2023-04-06 ENCOUNTER — THERAPY VISIT (OUTPATIENT)
Dept: PHYSICAL THERAPY | Facility: CLINIC | Age: 43
End: 2023-04-06
Payer: COMMERCIAL

## 2023-04-06 DIAGNOSIS — M25.512 CHRONIC LEFT SHOULDER PAIN: Primary | ICD-10-CM

## 2023-04-06 DIAGNOSIS — G89.29 CHRONIC LEFT SHOULDER PAIN: Primary | ICD-10-CM

## 2023-04-06 PROCEDURE — 97110 THERAPEUTIC EXERCISES: CPT | Mod: GP | Performed by: PHYSICAL THERAPIST

## 2023-04-13 ENCOUNTER — THERAPY VISIT (OUTPATIENT)
Dept: PHYSICAL THERAPY | Facility: CLINIC | Age: 43
End: 2023-04-13
Payer: COMMERCIAL

## 2023-04-13 DIAGNOSIS — G89.29 CHRONIC LEFT SHOULDER PAIN: Primary | ICD-10-CM

## 2023-04-13 DIAGNOSIS — S46.912A STRAIN OF LEFT SHOULDER, INITIAL ENCOUNTER: ICD-10-CM

## 2023-04-13 DIAGNOSIS — M25.512 CHRONIC LEFT SHOULDER PAIN: Primary | ICD-10-CM

## 2023-04-13 PROCEDURE — 97110 THERAPEUTIC EXERCISES: CPT | Mod: GP | Performed by: PHYSICAL THERAPIST

## 2023-04-20 ENCOUNTER — THERAPY VISIT (OUTPATIENT)
Dept: PHYSICAL THERAPY | Facility: CLINIC | Age: 43
End: 2023-04-20
Payer: COMMERCIAL

## 2023-04-20 DIAGNOSIS — G89.29 CHRONIC LEFT SHOULDER PAIN: Primary | ICD-10-CM

## 2023-04-20 DIAGNOSIS — M25.512 CHRONIC LEFT SHOULDER PAIN: Primary | ICD-10-CM

## 2023-04-20 PROCEDURE — 97110 THERAPEUTIC EXERCISES: CPT | Mod: GP | Performed by: PHYSICAL THERAPIST

## 2023-04-29 ENCOUNTER — HEALTH MAINTENANCE LETTER (OUTPATIENT)
Age: 43
End: 2023-04-29

## 2023-06-01 ENCOUNTER — THERAPY VISIT (OUTPATIENT)
Dept: PHYSICAL THERAPY | Facility: CLINIC | Age: 43
End: 2023-06-01
Payer: COMMERCIAL

## 2023-06-01 DIAGNOSIS — M25.512 CHRONIC LEFT SHOULDER PAIN: Primary | ICD-10-CM

## 2023-06-01 DIAGNOSIS — G89.29 CHRONIC LEFT SHOULDER PAIN: Primary | ICD-10-CM

## 2023-06-01 PROCEDURE — 97110 THERAPEUTIC EXERCISES: CPT | Mod: GP | Performed by: PHYSICAL THERAPIST

## 2023-06-02 NOTE — PROGRESS NOTES
06/01/23 0500   Appointment Info   Signing clinician's name / credentials Kong Preciado, PT   Total/Authorized Visits 18   Visits Used 15   Medical Diagnosis Chronic left shoulder pain   PT Tx Diagnosis Chronic left shoulder pain   Progress Note/Certification   Onset of illness/injury or Date of Surgery   (January 2022)   Therapy Frequency every other week   Predicted Duration 8 weeks   Progress Note Due Date 07/30/23   PT Goal 1   Goal Identifier Patient has limited ROM due to capsule tightness   Goal Description Patient will have unrestricted ROM, symmetrical to other shoulder with minimal strain at end range   Rationale to maximize safety and independence with performance of ADLs and functional tasks;to maximize safety and independence within the home;to maximize safety and independence within the community;to maximize safety and independence with self cares   Goal Progress AROM Flexion 154, Ext 70, Abd 170, ER 70, IR T 12   Target Date 07/30/23   Subjective Report   Subjective Report Patient relates that he has been doing very well with regards to his L shoulder.  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  Notices that his motion is still limited, but is not having pain.  Has been able to resolve the neck and shoulder pain with neck exercises and does not have pain unless reaches his end range of motion.  Would like to regain the remainder of his motion,   Objective Measures   Objective Measures Objective Measure 1;Objective Measure 2;Objective Measure 3;Objective Measure 4   Objective Measure 1   Objective Measure AROM L shoulder   Details Flexion 154, Ext 70, Abd 170, ER 70, IR T 12   Objective Measure 2   Objective Measure PROM L shoulder   Details Flexion 155, Abd 120 if remaining in abd plane (with mild scaption 170),  ER 60, IR 70   Objective Measure 3   Objective Measure MMT    Details 5/5 throughout the L shoulder   Objective Measure 4   Objective Measure SPADI   Details Score is 0 today, improved from 39.23 on 4/20/2023   Treatment Interventions (PT)   Interventions Therapeutic Procedure/Exercise   Therapeutic Procedure/Exercise   PTRx Ther Proc 1 Wand Shoulder Flexion Supine   PTRx Ther Proc 1 - Details 30 reps - prod ERP NW   PTRx Ther Proc 2 Wand Shoulder External Rotation in Neutral   PTRx Ther Proc 2 - Details 30 reps - produce ERP NW   PTRx Ther Proc 3 Wand Shoulder External Rotation at 90 degrees Abduction   PTRx Ther Proc 3 - Details 30 reps prod ERP NW   PTRx Ther Proc 4 Shoulder Sidelying Abduction   PTRx Ther Proc 4 - Details 30 reps P ERP NW   PTRx Ther Proc 5 Side-lying Posterior Capsule Stretch   PTRx Ther Proc 5 - Details 30 reps P ERP NW   PTRx Ther Proc 6 Seated Cervical Retraction Extension With Overpressure   PTRx Ther Proc 6 - Details 10 reps feels like a good stretch base of the neck (discussed continue 2 times a day).   PTRx Ther Proc 7 Cervical Side Bending With Retraction   PTRx Ther Proc 7 - Details patient to do this as needed    PTRx Ther Proc 8 Lat Pulldown   PTRx Ther Proc 8 - Details Pt notes that he [place] and once that is completed will resume his strengthening program   PTRx Ther Proc 9 Shoulder Theraband Low Row/Pulldown   PTRx Ther Proc 9 - Details Pt notes that he [place] and once that is completed will resume his strengthening program   PTRx Ther Proc 10 Shoulder Theraband Adduction   PTRx Ther Proc 10 - Details Pt notes that he [place] and once that is completed will resume his strengthening program   PTRx Ther Proc 11 Push-Up Plus At Wall   PTRx Ther Proc 11 - Details Pt notes that he [place] and once that is completed will resume his strengthening program   PTRx Ther Proc 12 Theraband Shoulder External  Rotation at 90/90   PTRx Ther Proc 12 - Details Pt notes that he will [place] and once that is completed will resume his strengthening program   PTRx Ther Proc 13 Shoulder External Rotation Sidelying   PTRx Ther Proc 13 - Details Pt notes that he will [place] and once that is completed will resume his strengthening program   PTRx Ther Proc 14 Bent Over Rows with Dumbbell   PTRx Ther Proc 14 - Details Pt notes that he will [place] and once that is completed will resume his strengthening program   PTRx Ther Proc 15 Bent Over Tricep Extension   PTRx Ther Proc 15 - Details Pt notes that he will [place] and once that is completed will resume his strengthening program   Therapeutic Procedures: strength, endurance, ROM, flexibility minutes ([id number]) 40   PTRx Ther Proc 16 Cable Shoulder External Rotation at Side   PTRx Ther Proc 16 - Details Pt notes that he [place] and once that is completed will resume his strengthening program   PTRx Ther Proc 17 Cable Shoulder. External Rotation at 90/90   PTRx Ther Proc 17 - Details Pt notes that he will [place] and once that is completed will resume his strengthening program   PTRx Ther Proc 18 Shoulder Press Dumbbells Bilateral   PTRx Ther Proc 18 - Details Pt notes that he will [place] and once that is completed will resume his strengthening program   PTRx Ther Proc 19 Bicep Curls with Dumbbells   PTRx Ther Proc 19 - Details Pt notes that he will continue [place] and once that is completed will resume his strengthening program   PTRx Ther Proc 20 Shoulder Theraband Internal Rotation   PTRx Ther Proc 20 - Details Pt notes that he [place] and once that is completed will resume his strengthening program    Education   Learner/Method No Barriers to Learning;Pictures/Video;Demonstration;Listening   Plan   Home program focus on AAROM/PROM for the L shoulder   Updates to plan of care adding ROM   Plan for next session continue ROM consider mobilization if not gaining motion over the next 2 weeks   Total Session Time   Timed Code Treatment Minutes 40   Total Treatment Time (sum of timed and untimed services) 40       PLAN  Continue therapy per current plan of care. (Patient was not seen in the month of May).  Will resume stretching and will add mobilization if not progressing as expected.    Continue every other week for 3-4 additional sessions.    Beginning/End Dates of Progress Note Reporting Period:    3/15/2023 o 06/01/2023    Referring Provider:  Ishmael Mccall

## 2023-06-15 ENCOUNTER — THERAPY VISIT (OUTPATIENT)
Dept: PHYSICAL THERAPY | Facility: CLINIC | Age: 43
End: 2023-06-15
Payer: COMMERCIAL

## 2023-06-15 DIAGNOSIS — G89.29 CHRONIC LEFT SHOULDER PAIN: Primary | ICD-10-CM

## 2023-06-15 DIAGNOSIS — M25.512 CHRONIC LEFT SHOULDER PAIN: Primary | ICD-10-CM

## 2023-06-15 PROCEDURE — 97110 THERAPEUTIC EXERCISES: CPT | Mod: GP | Performed by: PHYSICAL THERAPIST

## 2023-06-15 PROCEDURE — 97140 MANUAL THERAPY 1/> REGIONS: CPT | Mod: GP | Performed by: PHYSICAL THERAPIST

## 2023-07-07 ENCOUNTER — THERAPY VISIT (OUTPATIENT)
Dept: PHYSICAL THERAPY | Facility: CLINIC | Age: 43
End: 2023-07-07
Payer: COMMERCIAL

## 2023-07-07 DIAGNOSIS — M25.512 CHRONIC LEFT SHOULDER PAIN: Primary | ICD-10-CM

## 2023-07-07 DIAGNOSIS — G89.29 CHRONIC LEFT SHOULDER PAIN: Primary | ICD-10-CM

## 2023-07-07 PROCEDURE — 97140 MANUAL THERAPY 1/> REGIONS: CPT | Mod: GP | Performed by: PHYSICAL THERAPIST

## 2023-07-07 PROCEDURE — 97110 THERAPEUTIC EXERCISES: CPT | Mod: GP | Performed by: PHYSICAL THERAPIST

## 2023-07-25 ENCOUNTER — TELEPHONE (OUTPATIENT)
Dept: FAMILY MEDICINE | Facility: CLINIC | Age: 43
End: 2023-07-25
Payer: COMMERCIAL

## 2023-07-25 NOTE — TELEPHONE ENCOUNTER
PA Initiation    Medication: FREESTYLE GAYLE 2 SENSOR [facility]  Insurance Company: [employer] ([facility]) - Phone [phone] Fax [phone]  Pharmacy Filling the Rx:    Filling Pharmacy Phone:    Filling Pharmacy Fax:    Start Date: 7/25/2023  Key: [id number]

## 2023-07-27 NOTE — TELEPHONE ENCOUNTER
Prior Authorization Approval    Medication: FREESTYLE GAYLE 2 SENSOR [facility]  Authorization Effective Date: 7/25/2023  Authorization Expiration Date: 7/25/2024  Approved Dose/Quantity: 2/28 days   Reference #: Key: [id number]   Insurance Company: [facility] - Phone [phone] Fax [phone]  Expected CoPay:       CoPay Card Available:      Financial Assistance Needed: no  Which Pharmacy is filling the prescription:    Pharmacy Notified:    Patient Notified:

## 2023-08-10 ENCOUNTER — THERAPY VISIT (OUTPATIENT)
Dept: PHYSICAL THERAPY | Facility: CLINIC | Age: 43
End: 2023-08-10
Payer: COMMERCIAL

## 2023-08-10 DIAGNOSIS — M25.512 CHRONIC LEFT SHOULDER PAIN: Primary | ICD-10-CM

## 2023-08-10 DIAGNOSIS — G89.29 CHRONIC LEFT SHOULDER PAIN: Primary | ICD-10-CM

## 2023-08-10 PROCEDURE — 97110 THERAPEUTIC EXERCISES: CPT | Mod: GP | Performed by: PHYSICAL THERAPIST

## 2023-08-14 ENCOUNTER — MYC MEDICAL ADVICE (OUTPATIENT)
Dept: FAMILY MEDICINE | Facility: CLINIC | Age: 43
End: 2023-08-14
Payer: COMMERCIAL

## 2023-08-14 DIAGNOSIS — Z79.4 TYPE 2 DIABETES MELLITUS WITH HYPERGLYCEMIA, WITH LONG-TERM CURRENT USE OF INSULIN (H): ICD-10-CM

## 2023-08-14 DIAGNOSIS — E11.65 TYPE 2 DIABETES MELLITUS WITH HYPERGLYCEMIA, WITH LONG-TERM CURRENT USE OF INSULIN (H): ICD-10-CM

## 2023-08-14 RX ORDER — LIRAGLUTIDE 6 MG/ML
1.8 INJECTION SUBCUTANEOUS DAILY
Qty: 15 ML | Refills: 3 | Status: SHIPPED | OUTPATIENT
Start: 2023-08-14 | End: 2023-09-21

## 2023-08-14 RX ORDER — INSULIN HUMAN 100 [IU]/ML
INJECTION, SUSPENSION SUBCUTANEOUS
Qty: 75 ML | Refills: 1 | Status: SHIPPED | OUTPATIENT
Start: 2023-08-14 | End: 2023-09-21

## 2023-08-14 NOTE — TELEPHONE ENCOUNTER
Please help patient schedule visit. Route to PCP once scheduled for susi refill.     Thank you,  Raeann Spain RN

## 2023-08-16 ENCOUNTER — PATIENT OUTREACH (OUTPATIENT)
Dept: CARE COORDINATION | Facility: CLINIC | Age: 43
End: 2023-08-16
Payer: COMMERCIAL

## 2023-08-17 ENCOUNTER — OFFICE VISIT (OUTPATIENT)
Dept: FAMILY MEDICINE | Facility: CLINIC | Age: 43
End: 2023-08-17
Payer: COMMERCIAL

## 2023-08-17 VITALS
BODY MASS INDEX: 35.44 KG/M2 | HEIGHT: 73 IN | SYSTOLIC BLOOD PRESSURE: 145 MMHG | HEART RATE: 75 BPM | DIASTOLIC BLOOD PRESSURE: 89 MMHG | RESPIRATION RATE: 12 BRPM | TEMPERATURE: 98.1 F | WEIGHT: 267.4 LBS | OXYGEN SATURATION: 96 %

## 2023-08-17 DIAGNOSIS — I10 HYPERTENSION GOAL BP (BLOOD PRESSURE) < 140/90: ICD-10-CM

## 2023-08-17 DIAGNOSIS — E11.65 TYPE 2 DIABETES MELLITUS WITH HYPERGLYCEMIA, WITH LONG-TERM CURRENT USE OF INSULIN (H): ICD-10-CM

## 2023-08-17 DIAGNOSIS — Z79.4 TYPE 2 DIABETES MELLITUS WITH HYPERGLYCEMIA, WITH LONG-TERM CURRENT USE OF INSULIN (H): ICD-10-CM

## 2023-08-17 DIAGNOSIS — K61.2 ABSCESS OF ANAL AND RECTAL REGIONS: Primary | ICD-10-CM

## 2023-08-17 PROCEDURE — 99213 OFFICE O/P EST LOW 20 MIN: CPT

## 2023-08-17 RX ORDER — MUPIROCIN 20 MG/G
OINTMENT TOPICAL 3 TIMES DAILY
Qty: 30 G | Refills: 0 | Status: SHIPPED | OUTPATIENT
Start: 2023-08-17 | End: 2023-08-22

## 2023-08-17 ASSESSMENT — PAIN SCALES - GENERAL: PAINLEVEL: NO PAIN (0)

## 2023-08-17 NOTE — PROGRESS NOTES
"  Assessment & Plan     Abscess of anal and rectal regions  Acute on chronic/recurrent problem. improving since ER visit. Advised on bactroban and hibicklenz regimen to help reduce incidence of skin infections.   - mupirocin (BACTROBAN) 2 % external ointment  Dispense: 30 g; Refill: 0      Hypertension goal BP (blood pressure) < 140/90  Type 2 diabetes mellitus with hyperglycemia, with long-term current use of insulin (H)  Chronic, well controlled. Follows with PCP. No changes, considered in follow up plan since pt has increased risk of delayed healing and skin infections.              MED REC REQUIRED  Post Medication Reconciliation Status:   BMI:   Estimated body mass index is 35.28 kg/m  as calculated from the following:    Height as of this encounter: 1.854 m (6' 1\").    Weight as of this encounter: 121.3 kg (267 lb 6.4 oz).       See Patient Instructions    JAMARI Carter [facility] JERAD Guillen is a 43 year old, presenting for the following health issues:  ER F/U        8/17/2023     9:16 AM   Additional Questions   Roomed by Alize MULLINS MA         8/17/2023     9:16 AM   Patient Reported Additional Medications   Patient reports taking the following new medications Clindamycin 150 mg cap for 10 days, percocet 5-325 mg       History of Present Illness       Reason for visit:  ER follow-up    He eats 2-3 servings of fruits and vegetables daily.He consumes 0 sweetened beverage(s) daily.He exercises with enough effort to increase his heart rate 30 to 60 minutes per day.  He exercises with enough effort to increase his heart rate 3 or less days per week. He is missing 1 dose(s) of medications per week.       ED/UC Followup:    Facility:  [facility]  Date of visit: 08/14/23  Reason for visit: Cellulitis, Abscess of buttock  Current Status: Pt say it is improving.   type 2 diabetes here for evaluation secondary to painful swelling on the right buttock " "where he has had an abscess and cellulitis in the past. On examination no overlying cellulitis but he does have suggestion of abscess fortunately no involvement of the scrotum, no crepitus, no GI symptoms worrisome for fistula or rectal involvement. He feels this really started today or possibly yesterday, and fortunately came in early. He was given pain medication, the area was anesthetized and incised and drained. He will be treated with clindamycin however discussed wound care and return precautions. He will also be given small number of pain medications. We discussed packing, he declined at this time I think this is reasonable we discussed using sitz bath's to keep the area soft and draining. He will follow-up in 2 to 3 days for wound recheck or return sooner if significant worsening.     Changing dressing 2-3 times/day.   Minimal dried drainage on dressings.   No pain meds needed since first day home.     No issues with antibiotics, no diarrhea.           Review of Systems   Constitutional, HEENT, cardiovascular, pulmonary, gi and gu systems are negative, except as otherwise noted.      Objective    BP (!) 145/89 (BP Location: Left arm, Patient Position: Chair, Cuff Size: Adult Large)   Pulse 75   Temp 98.1  F (36.7  C) (Oral)   Resp 12   Ht 1.854 m (6' 1\")   Wt 121.3 kg (267 lb 6.4 oz)   SpO2 96%   BMI 35.28 kg/m    Body mass index is 35.28 kg/m .  Physical Exam   GENERAL: healthy, alert and no distress  NECK: no adenopathy, no asymmetry, masses, or scars and thyroid normal to palpation  RESP: lungs clear to auscultation - no rales, rhonchi or wheezes  CV: regular rate and rhythm, normal S1 S2, no S3 or S4, no murmur, click or rub, no peripheral edema and peripheral pulses strong  ABDOMEN: soft, nontender, no hepatosplenomegaly, no masses and bowel sounds normal  MS: no gross musculoskeletal defects noted, no edema                      "

## 2023-08-17 NOTE — PATIENT INSTRUCTIONS
Hibiclenz (chlorhexidine ) weekly wash for 3 minutes then rinse thoroughly.  Bactroban (mupirocin) ointment in BOTH nostrils 3 times daily for 5 days.

## 2023-08-30 ENCOUNTER — PATIENT OUTREACH (OUTPATIENT)
Dept: CARE COORDINATION | Facility: CLINIC | Age: 43
End: 2023-08-30
Payer: COMMERCIAL

## 2023-09-14 ENCOUNTER — THERAPY VISIT (OUTPATIENT)
Dept: PHYSICAL THERAPY | Facility: CLINIC | Age: 43
End: 2023-09-14
Payer: COMMERCIAL

## 2023-09-14 DIAGNOSIS — G89.29 CHRONIC LEFT SHOULDER PAIN: Primary | ICD-10-CM

## 2023-09-14 DIAGNOSIS — M25.512 CHRONIC LEFT SHOULDER PAIN: Primary | ICD-10-CM

## 2023-09-14 PROCEDURE — 97110 THERAPEUTIC EXERCISES: CPT | Mod: GP | Performed by: PHYSICAL THERAPIST

## 2023-09-15 NOTE — PROGRESS NOTES
09/14/23 0500   Appointment Info   Signing clinician's name / credentials Kong Preciado, PT   Total/Authorized Visits 22   Visits Used 19   Medical Diagnosis Chronic left shoulder pain   PT Tx Diagnosis Chronic left shoulder pain   Progress Note/Certification   Onset of illness/injury or Date of Surgery   (January 2022)   Therapy Frequency 1 time a month   Predicted Duration 1-3 months   Progress Note Due Date 09/14/23   Progress Note Completed Date 07/30/23       Present No   PT Goal 1   Goal Identifier Patient has limited ROM due to capsule tightness   Goal Description Patient will have unrestricted ROM, symmetrical to other shoulder with minimal strain at end range   Rationale to maximize safety and independence with performance of ADLs and functional tasks;to maximize safety and independence within the home;to maximize safety and independence within the community;to maximize safety and independence with self cares   Goal Progress AROM Flexion 154, Ext 70, Abd 170, ER 80, IR T 11  (has not been consistent with shoulder ROM over the past 2 weeks wiiithout loss of motion, but did not gain motion except for ER (inc from 65 to 80))   Target Date 10/20/23   Subjective Report   Subjective Report Patient notes that he has had more L sided UT symptoms and R neck pain over the past 1-2 weeks.  He did his shoulder ROM fairly consistently for the first 2 weeks after last PT session on 8/10/2023.  He is not sure if they assisted with gaining shoulder ROM or not.  He has not been as consistent with his shoulder exercise over the past week or 2.  (his father fell and sustained a L humerus fracture, R femur fracture near the head of the femur and lower thoracic/upper lumbar spine fractures).  Thus, he has been working on assisting with his father's care.   Objective Measures   Objective Measures Objective Measure 1;Objective Measure 2;Objective Measure 3;Objective Measure 4   Objective Measure 1    Objective Measure AROM L shoulder   Details flexion 154, Ext 70, Abd 170, ER 80, IR T 12  (inc shoulder quality of motion and inc IR/ER after neck ext biased exercise)   Objective Measure 2   Objective Measure PROM L shoulder   Details PROM flexion 164, abd 176, ER 84, IR T 10   Objective Measure 3   Objective Measure MMT   Details 5/5 throughout the L shoulder   Objective Measure 4   Objective Measure SPADI   Details did not reassess today   Treatment Interventions (PT)   Interventions Therapeutic Procedure/Exercise   Therapeutic Procedure/Exercise   Therapeutic Procedures: strength, endurance, ROM, flexibillity minutes (09697) 40   Ther Proc 1 neck retraction with self overpressure   Ther Proc 1 - Details 10 reps - good tech - dec UT tightness, inc neck rot L with less end range strain, inc shoulder flexion and IR/Ext ROM.  imp quality of abd and ER   PTRx Ther Proc 1 Cervical Retraction With Patient Overpressure   PTRx Ther Proc 1 - Details 10 reps - decreased tightness in neck inc rotation L (less pinch) increase shoulder IR behind back and flexion improved quality of abduction ER   PTRx Ther Proc 2 Seated Cervical Retraction Extension With Overpressure   PTRx Ther Proc 2 - Details 10 reps extension - feels good to do.  further improvement in neck rotation and improved quatity of shoulder ROM10 with self o/p - pinch at end range rot L was now a stretch - inc flexion and IR ROM.  Inc quality of abduction and ER   PTRx Ther Proc 3 Cervical Side Bending With Retraction   PTRx Ther Proc 3 - Details patient to do this as needed    PTRx Ther Proc 4 Wand Shoulder Flexion Supine   PTRx Ther Proc 4 - Details 30 reps - 164 deg active    PTRx Ther Proc 5 Wand Shoulder External Rotation in Neutral   PTRx Ther Proc 5 - Details 30 reps active 84   PTRx Ther Proc 6 Wand Shoulder External Rotation at 90 degrees Abduction   PTRx Ther Proc 6 - Details 30 reps active 85   PTRx Ther Proc 7 Shoulder Sidelying Abduction   PTRx Ther  Proc 7 - Details 30 repsactive    PTRx Ther Proc 8 Repeated Extension in Lying with Lock/Sag   PTRx Ther Proc 8 - Details No Notes   Skilled Intervention will focus on neck repeated movement testing/directional prefernce exercise for about 2-3 wks then when stable will resume shoulder ROM   Patient Response/Progress better after neck extension biased ex; he is independent with the shoulder ROM and does feel confident with adding the shoulder ROM in when his neck feels more stable/shoulder ROM does not bother his neck or shoulder   Education   Learner/Method No Barriers to Learning;Pictures/Video;Demonstration;Listening   Plan   Home program see PTRx   Updates to plan of care will focus on the neck for the next 2-3 wks then resume shoulder AROM with self o/p   Plan for next session determine if ready to add strengthening   Comments   Comments no significant change in RPM over past month; will resume neck extension biased ex and after results are known, then resume shouder AROM with self o/p.   Total Session Time   Timed Code Treatment Minutes 40   Total Treatment Time (sum of timed and untimed services) 40         PLAN  Continue therapy per current plan of care.  Progress has been slow but did have improvement with resuming neck extension biased exercise today.  Will resume shoulder AROM with self overpressure in 2-3 weeks.  Return to PT 10/20/2023.   Will plan to see patient 1 time a month for 1-3 months.   Beginning/End Dates of Progress Note Reporting Period:  07/30/23 to 09/14/2023    Referring Provider:  Ishmael Mccall

## 2023-09-20 ENCOUNTER — TRANSFERRED RECORDS (OUTPATIENT)
Dept: MULTI SPECIALTY CLINIC | Facility: CLINIC | Age: 43
End: 2023-09-20
Payer: COMMERCIAL

## 2023-09-20 LAB — RETINOPATHY: NORMAL

## 2023-09-21 ENCOUNTER — OFFICE VISIT (OUTPATIENT)
Dept: FAMILY MEDICINE | Facility: CLINIC | Age: 43
End: 2023-09-21
Payer: COMMERCIAL

## 2023-09-21 VITALS
DIASTOLIC BLOOD PRESSURE: 90 MMHG | RESPIRATION RATE: 20 BRPM | OXYGEN SATURATION: 98 % | WEIGHT: 265 LBS | SYSTOLIC BLOOD PRESSURE: 164 MMHG | BODY MASS INDEX: 35.12 KG/M2 | HEIGHT: 73 IN | HEART RATE: 110 BPM | TEMPERATURE: 97.7 F

## 2023-09-21 DIAGNOSIS — Z00.00 ROUTINE GENERAL MEDICAL EXAMINATION AT A HEALTH CARE FACILITY: Primary | ICD-10-CM

## 2023-09-21 DIAGNOSIS — I10 HYPERTENSION GOAL BP (BLOOD PRESSURE) < 140/90: ICD-10-CM

## 2023-09-21 DIAGNOSIS — Z79.4 TYPE 2 DIABETES MELLITUS WITH HYPERGLYCEMIA, WITH LONG-TERM CURRENT USE OF INSULIN (H): ICD-10-CM

## 2023-09-21 DIAGNOSIS — E11.65 TYPE 2 DIABETES MELLITUS WITH HYPERGLYCEMIA, WITH LONG-TERM CURRENT USE OF INSULIN (H): ICD-10-CM

## 2023-09-21 DIAGNOSIS — E78.5 HYPERLIPIDEMIA LDL GOAL <100: ICD-10-CM

## 2023-09-21 LAB
ANION GAP SERPL CALCULATED.3IONS-SCNC: 13 MMOL/L (ref 7–15)
BUN SERPL-MCNC: 12.9 MG/DL (ref 6–20)
CALCIUM SERPL-MCNC: 9.6 MG/DL (ref 8.6–10)
CHLORIDE SERPL-SCNC: 102 MMOL/L (ref 98–107)
CHOLEST SERPL-MCNC: 230 MG/DL
CREAT SERPL-MCNC: 0.79 MG/DL (ref 0.67–1.17)
CREAT UR-MCNC: 72.6 MG/DL
DEPRECATED HCO3 PLAS-SCNC: 22 MMOL/L (ref 22–29)
EGFRCR SERPLBLD CKD-EPI 2021: >90 ML/MIN/1.73M2
GLUCOSE SERPL-MCNC: 358 MG/DL (ref 70–99)
HBA1C MFR BLD: 10.9 % (ref 0–5.6)
HDLC SERPL-MCNC: 34 MG/DL
LDLC SERPL CALC-MCNC: 119 MG/DL
MICROALBUMIN UR-MCNC: 13.4 MG/L
MICROALBUMIN/CREAT UR: 18.46 MG/G CR (ref 0–17)
NONHDLC SERPL-MCNC: 196 MG/DL
POTASSIUM SERPL-SCNC: 4.5 MMOL/L (ref 3.4–5.3)
SODIUM SERPL-SCNC: 137 MMOL/L (ref 136–145)
TRIGL SERPL-MCNC: 385 MG/DL

## 2023-09-21 PROCEDURE — 80061 LIPID PANEL: CPT | Performed by: FAMILY MEDICINE

## 2023-09-21 PROCEDURE — 82043 UR ALBUMIN QUANTITATIVE: CPT | Performed by: FAMILY MEDICINE

## 2023-09-21 PROCEDURE — 82570 ASSAY OF URINE CREATININE: CPT | Performed by: FAMILY MEDICINE

## 2023-09-21 PROCEDURE — 90715 TDAP VACCINE 7 YRS/> IM: CPT | Performed by: FAMILY MEDICINE

## 2023-09-21 PROCEDURE — 99207 PR FOOT EXAM NO CHARGE: CPT | Performed by: FAMILY MEDICINE

## 2023-09-21 PROCEDURE — 80048 BASIC METABOLIC PNL TOTAL CA: CPT | Performed by: FAMILY MEDICINE

## 2023-09-21 PROCEDURE — 99396 PREV VISIT EST AGE 40-64: CPT | Mod: 25 | Performed by: FAMILY MEDICINE

## 2023-09-21 PROCEDURE — 90686 IIV4 VACC NO PRSV 0.5 ML IM: CPT | Performed by: FAMILY MEDICINE

## 2023-09-21 PROCEDURE — 36415 COLL VENOUS BLD VENIPUNCTURE: CPT | Performed by: FAMILY MEDICINE

## 2023-09-21 PROCEDURE — 83036 HEMOGLOBIN GLYCOSYLATED A1C: CPT | Performed by: FAMILY MEDICINE

## 2023-09-21 PROCEDURE — 90471 IMMUNIZATION ADMIN: CPT | Performed by: FAMILY MEDICINE

## 2023-09-21 PROCEDURE — 99214 OFFICE O/P EST MOD 30 MIN: CPT | Mod: 25 | Performed by: FAMILY MEDICINE

## 2023-09-21 PROCEDURE — 90472 IMMUNIZATION ADMIN EACH ADD: CPT | Performed by: FAMILY MEDICINE

## 2023-09-21 RX ORDER — LIRAGLUTIDE 6 MG/ML
1.8 INJECTION SUBCUTANEOUS DAILY
Qty: 15 ML | Refills: 3 | Status: SHIPPED | OUTPATIENT
Start: 2023-09-21 | End: 2024-02-22

## 2023-09-21 RX ORDER — INSULIN HUMAN 100 [IU]/ML
INJECTION, SUSPENSION SUBCUTANEOUS
Qty: 75 ML | Refills: 1 | Status: SHIPPED | OUTPATIENT
Start: 2023-09-21 | End: 2023-11-03

## 2023-09-21 RX ORDER — AMLODIPINE BESYLATE 10 MG/1
10 TABLET ORAL DAILY
Qty: 90 TABLET | Refills: 1 | Status: SHIPPED | OUTPATIENT
Start: 2023-09-21 | End: 2024-02-22

## 2023-09-21 RX ORDER — PEN NEEDLE, DIABETIC 32GX 5/32"
NEEDLE, DISPOSABLE MISCELLANEOUS
Qty: 300 EACH | Refills: 4 | Status: SHIPPED | OUTPATIENT
Start: 2023-09-21

## 2023-09-21 RX ORDER — LISINOPRIL 10 MG/1
10 TABLET ORAL DAILY
Qty: 90 TABLET | Refills: 1 | Status: SHIPPED | OUTPATIENT
Start: 2023-09-21 | End: 2024-02-22

## 2023-09-21 RX ORDER — SIMVASTATIN 10 MG
10 TABLET ORAL AT BEDTIME
Qty: 90 TABLET | Refills: 3 | Status: SHIPPED | OUTPATIENT
Start: 2023-09-21

## 2023-09-21 ASSESSMENT — ENCOUNTER SYMPTOMS
MYALGIAS: 0
HEMATURIA: 0
DIZZINESS: 0
NERVOUS/ANXIOUS: 0
PALPITATIONS: 0
PARESTHESIAS: 0
ABDOMINAL PAIN: 0
HEARTBURN: 0
ARTHRALGIAS: 0
CHILLS: 0
COUGH: 0
CONSTIPATION: 0
HEADACHES: 0
FEVER: 0
WEAKNESS: 0
SORE THROAT: 0
SHORTNESS OF BREATH: 0
NAUSEA: 0
DIARRHEA: 0
EYE PAIN: 0
JOINT SWELLING: 0
DYSURIA: 0
FREQUENCY: 0
HEMATOCHEZIA: 0

## 2023-09-21 ASSESSMENT — PAIN SCALES - GENERAL: PAINLEVEL: NO PAIN (0)

## 2023-09-21 NOTE — PROGRESS NOTES
SUBJECTIVE:   CC: Wilmer is an 43 year old who presents for preventative health visit.       9/21/2023     8:06 AM   Additional Questions   Roomed by Zakiya         9/21/2023     8:06 AM   Patient Reported Additional Medications   Patient reports taking the following new medications none       Healthy Habits:     Getting at least 3 servings of Calcium per day:  Yes    Bi-annual eye exam:  Yes    Dental care twice a year:  Yes    Sleep apnea or symptoms of sleep apnea:  None    Diet:  Low salt    Frequency of exercise:  2-3 days/week    Duration of exercise:  30-45 minutes    Taking medications regularly:  Yes    Medication side effects:  None    Additional concerns today:  No      --- Patient just lost his father 2 weeks ago, he is not sure what meds he's been taking or not taking, some he has been without refills.  Needs to get back on track with medications and refills       Diabetes Follow-up    How often are you checking your blood sugar? Not at all  What concerns do you have today about your diabetes? None   Do you have any of these symptoms? (Select all that apply)  No numbness or tingling in feet.  No redness, sores or blisters on feet.  No complaints of excessive thirst.  No reports of blurry vision.  No significant changes to weight.  Have you had a diabetic eye exam in the last 12 months? No    He has not been eating well or taking his medication due to grief     Lab Results   Component Value Date    A1C 10.9 09/21/2023    A1C 6.5 09/15/2022    A1C 8.3 06/06/2022    A1C 8.1 03/04/2022    A1C 7.7 12/03/2021    A1C 8.6 05/18/2021    A1C 9.9 11/27/2020    A1C 8.9 07/16/2020    A1C 7.4 03/16/2020    A1C 10.5 12/17/2019               Hyperlipidemia Follow-Up    Are you regularly taking any medication or supplement to lower your cholesterol?   No  Are you having muscle aches or other side effects that you think could be caused by your cholesterol lowering medication?  No    Hypertension Follow-up    Do you check  your blood pressure regularly outside of the clinic? No   Are you following a low salt diet? No  Are your blood pressures ever more than 140 on the top number (systolic) OR more   than 90 on the bottom number (diastolic), for example 140/90? No    BP Readings from Last 2 Encounters:   09/21/23 (!) 164/90   08/17/23 (!) 145/89     Hemoglobin A1C (%)   Date Value   09/15/2022 6.5 (H)   06/06/2022 8.3 (H)   05/18/2021 8.6 (H)   11/27/2020 9.9 (H)     LDL Cholesterol Calculated (mg/dL)   Date Value   09/15/2022 60   05/18/2021 134 (H)   03/16/2020 123 (H)               Social History     Tobacco Use    Smoking status: Never     Passive exposure: Never    Smokeless tobacco: Never   Substance Use Topics    Alcohol use: Yes     Comment: occasional             9/21/2023     7:27 AM   Alcohol Use   Prescreen: >3 drinks/day or >7 drinks/week? No          No data to display                Last PSA: No results found for: PSA    Reviewed orders with patient. Reviewed health maintenance and updated orders accordingly - Yes  BP Readings from Last 3 Encounters:   09/21/23 (!) 164/90   08/17/23 (!) 145/89   09/15/22 132/80    Wt Readings from Last 3 Encounters:   09/21/23 120.2 kg (265 lb)   08/17/23 121.3 kg (267 lb 6.4 oz)   09/15/22 120 kg (264 lb 9.6 oz)                    Reviewed and updated as needed this visit by clinical staff   Tobacco  Allergies  Meds              Reviewed and updated as needed this visit by Provider                     Review of Systems   Constitutional:  Negative for chills and fever.   HENT:  Negative for congestion, ear pain, hearing loss and sore throat.    Eyes:  Negative for pain and visual disturbance.   Respiratory:  Negative for cough and shortness of breath.    Cardiovascular:  Negative for chest pain, palpitations and peripheral edema.   Gastrointestinal:  Negative for abdominal pain, constipation, diarrhea, heartburn, hematochezia and nausea.   Genitourinary:  Positive for impotence.  "Negative for dysuria, frequency, genital sores, hematuria, penile discharge and urgency.   Musculoskeletal:  Negative for arthralgias, joint swelling and myalgias.   Skin:  Negative for rash.   Neurological:  Negative for dizziness, weakness, headaches and paresthesias.   Psychiatric/Behavioral:  Negative for mood changes. The patient is not nervous/anxious.          OBJECTIVE:   BP (!) 164/90 (BP Location: Right arm, Patient Position: Sitting, Cuff Size: Adult Large)   Pulse 110   Temp 97.7  F (36.5  C) (Oral)   Resp 20   Ht 1.854 m (6' 1\")   Wt 120.2 kg (265 lb)   SpO2 98%   BMI 34.96 kg/m      Physical Exam  GENERAL: healthy, alert and no distress  EYES: Eyes grossly normal to inspection, PERRL and conjunctivae and sclerae normal  HENT: ear canals and TM's normal, nose and mouth without ulcers or lesions  NECK: no adenopathy, no asymmetry, masses, or scars and thyroid normal to palpation  RESP: lungs clear to auscultation - no rales, rhonchi or wheezes  CV: regular rate and rhythm, normal S1 S2, no S3 or S4, no murmur, click or rub, no peripheral edema and peripheral pulses strong  ABDOMEN: soft, nontender, no hepatosplenomegaly, no masses and bowel sounds normal  MS: no gross musculoskeletal defects noted, no edema  SKIN: no suspicious lesions or rashes  NEURO: Normal strength and tone, mentation intact and speech normal  PSYCH: mentation appears normal and affect flat  Diabetic foot exam: normal DP and PT pulses, no trophic changes or ulcerative lesions, normal sensory exam, and normal monofilament exam    Diagnostic Test Results:  Labs reviewed in Epic  Results for orders placed or performed in visit on 09/21/23 (from the past 24 hour(s))   HEMOGLOBIN A1C   Result Value Ref Range    Hemoglobin A1C 10.9 (H) 0.0 - 5.6 %       ASSESSMENT/PLAN:   (Z00.00) Routine general medical examination at a health care facility  (primary encounter diagnosis)  Comment:   Plan:     (E11.65,  Z79.4) Type 2 diabetes " "mellitus with hyperglycemia, with long-term current use of insulin (H)  Comment: The patient stopped taking his medications as directed.  He is not sure what he has been taking.  We will restart everything and recheck in 4 to 6 weeks.  Plan: Adult Eye  Referral, HEMOGLOBIN A1C,         Lipid panel reflex to direct LDL Non-fasting,         Albumin Random Urine Quantitative with Creat         Ratio, FOOT EXAM, metFORMIN (GLUCOPHAGE) 1000         MG tablet, liraglutide (VICTOZA PEN) 18 MG/3ML         solution, insulin NPH-Regular (HUMULIN MIX         70/30 KWIKPEN) susp, BD HERRERA U/F 32G X 4 MM         insulin pen needle, Continuous Blood Gluc         Sensor (FREESTYLE GAYLE 2 SENSOR) MISC,         Hemoglobin A1c, OFFICE/OUTPT VISIT,EST,LEVL IV            (I10) Hypertension goal BP (blood pressure) < 140/90  Comment: His hypertension is not controlled but he does not know if he took his medication today.  Will not adjust meds just restart recheck in 4 to 6 weeks  Plan: BASIC METABOLIC PANEL, amLODIPine (NORVASC) 10         MG tablet, lisinopril (ZESTRIL) 10 MG tablet,         OFFICE/OUTPT VISIT,EST,LEVL IV            (E78.5) Hyperlipidemia LDL goal <100  Comment: The current medical regimen is effective;  continue present plan and medications.    Plan: simvastatin (ZOCOR) 10 MG tablet, OFFICE/OUTPT         VISIT,EST,LEVL IV              COUNSELING:   Reviewed preventive health counseling, as reflected in patient instructions       Regular exercise       Healthy diet/nutrition      BMI:   Estimated body mass index is 34.96 kg/m  as calculated from the following:    Height as of this encounter: 1.854 m (6' 1\").    Weight as of this encounter: 120.2 kg (265 lb).         He reports that he has never smoked. He has never been exposed to tobacco smoke. He has never used smokeless tobacco.            Tammy Guevara DO  [facility]  "

## 2023-11-03 ENCOUNTER — OFFICE VISIT (OUTPATIENT)
Dept: FAMILY MEDICINE | Facility: CLINIC | Age: 43
End: 2023-11-03
Payer: COMMERCIAL

## 2023-11-03 VITALS
HEART RATE: 83 BPM | HEIGHT: 73 IN | TEMPERATURE: 97.9 F | BODY MASS INDEX: 36.18 KG/M2 | OXYGEN SATURATION: 98 % | WEIGHT: 273 LBS | RESPIRATION RATE: 16 BRPM | DIASTOLIC BLOOD PRESSURE: 80 MMHG | SYSTOLIC BLOOD PRESSURE: 134 MMHG

## 2023-11-03 DIAGNOSIS — E11.65 TYPE 2 DIABETES MELLITUS WITH HYPERGLYCEMIA, WITH LONG-TERM CURRENT USE OF INSULIN (H): Primary | ICD-10-CM

## 2023-11-03 DIAGNOSIS — I10 HYPERTENSION GOAL BP (BLOOD PRESSURE) < 140/90: ICD-10-CM

## 2023-11-03 DIAGNOSIS — Z79.4 TYPE 2 DIABETES MELLITUS WITH HYPERGLYCEMIA, WITH LONG-TERM CURRENT USE OF INSULIN (H): Primary | ICD-10-CM

## 2023-11-03 LAB — HBA1C MFR BLD: 9.3 % (ref 0–5.6)

## 2023-11-03 PROCEDURE — 83036 HEMOGLOBIN GLYCOSYLATED A1C: CPT | Performed by: FAMILY MEDICINE

## 2023-11-03 PROCEDURE — 99214 OFFICE O/P EST MOD 30 MIN: CPT | Performed by: FAMILY MEDICINE

## 2023-11-03 PROCEDURE — 36415 COLL VENOUS BLD VENIPUNCTURE: CPT | Performed by: FAMILY MEDICINE

## 2023-11-03 PROCEDURE — 91320 SARSCV2 VAC 30MCG TRS-SUC IM: CPT | Performed by: FAMILY MEDICINE

## 2023-11-03 PROCEDURE — 90480 ADMN SARSCOV2 VAC 1/ONLY CMP: CPT | Performed by: FAMILY MEDICINE

## 2023-11-03 RX ORDER — INSULIN HUMAN 100 [IU]/ML
INJECTION, SUSPENSION SUBCUTANEOUS
Qty: 75 ML | Refills: 1 | Status: SHIPPED | OUTPATIENT
Start: 2023-11-03 | End: 2023-11-14

## 2023-11-03 ASSESSMENT — PAIN SCALES - GENERAL: PAINLEVEL: NO PAIN (0)

## 2023-11-03 NOTE — PROGRESS NOTES
"  Assessment & Plan     Type 2 diabetes mellitus with hyperglycemia, with long-term current use of insulin (H)    We will have the patient switch from Victoza to Wegovy to help with his diabetes and weight loss.  Recheck in 2 months    - Hemoglobin A1c  - Semaglutide-Weight Management (WEGOVY) 0.25 MG/0.5ML pen; Inject 0.25 mg Subcutaneous once a week  - insulin NPH-Regular (HUMULIN MIX 70/30 KWIKPEN) susp; INJECT 50 UNITS SUB-Q BEFORE BREAKFAST AND 50 UNITS BEFORE DINNER    Hypertension goal BP (blood pressure) < 140/90  Blood pressure good at home      30 minutes spent by me on the date of the encounter doing chart review, history and exam, documentation and further activities per the note       BMI:   Estimated body mass index is 36.02 kg/m  as calculated from the following:    Height as of this encounter: 1.854 m (6' 1\").    Weight as of this encounter: 123.8 kg (273 lb).   Weight management plan: Discussed healthy diet and exercise guidelines    Follow-up in 2 to 3 months    Tammy Guevara DO  [facility]    Raman Guillen is a 43 year old, presenting for the following health issues:  Chronic Disease Management        11/3/2023     1:42 PM   Additional Questions   Roomed by Zakiya MULLINS   Accompanied by self         11/3/2023     1:42 PM   Patient Reported Additional Medications   Patient reports taking the following new medications none       History of Present Illness       Diabetes:   He presents for follow up of diabetes.   He is checking home blood glucose with a continuous glucose monitor.   He checks blood glucose before meals, after meals, before and after meals and at bedtime.  Blood glucose is sometimes over 200 and sometimes under 70. He is aware of hypoglycemia symptoms including none.    He has no concerns regarding his diabetes at this time.   He is not experiencing numbness or burning in feet, excessive thirst, blurry vision, weight changes or redness, sores or blisters on " "feet.           He eats 2-3 servings of fruits and vegetables daily.He consumes 0 sweetened beverage(s) daily.He exercises with enough effort to increase his heart rate 30 to 60 minutes per day.  He exercises with enough effort to increase his heart rate 4 days per week. He is missing 1 dose(s) of medications per week.     Lab Results   Component Value Date    A1C 9.3 11/03/2023    A1C 10.9 09/21/2023    A1C 6.5 09/15/2022    A1C 8.3 06/06/2022    A1C 8.1 03/04/2022    A1C 8.6 05/18/2021    A1C 9.9 11/27/2020    A1C 8.9 07/16/2020    A1C 7.4 03/16/2020    A1C 10.5 12/17/2019     The patient had stopped all of his diabetes medications and just restarted them again 1 month ago.  He takes metformin, Victoza, Humulin 70/30    Hyperlipidemia Follow-Up    Are you regularly taking any medication or supplement to lower your cholesterol?   Yes- simvastatin  Are you having muscle aches or other side effects that you think could be caused by your cholesterol lowering medication?  No    Hypertension Follow-up    Do you check your blood pressure regularly outside of the clinic? No   Are you following a low salt diet? Yes  Are your blood pressures ever more than 140 on the top number (systolic) OR more   than 90 on the bottom number (diastolic), for example 140/90? No    Amlodipine 10 mg daily and lisinopril 10 mg daily.        Review of Systems   Constitutional, HEENT, cardiovascular, pulmonary, gi and gu systems are negative, except as otherwise noted.      Objective    /80   Pulse 83   Temp 97.9  F (36.6  C) (Oral)   Resp 16   Ht 1.854 m (6' 1\")   Wt 123.8 kg (273 lb)   SpO2 98%   BMI 36.02 kg/m    Body mass index is 36.02 kg/m .  Physical Exam   GENERAL: healthy, alert and no distress  NECK: no adenopathy, no asymmetry, masses, or scars and thyroid normal to palpation  RESP: lungs clear to auscultation - no rales, rhonchi or wheezes  CV: regular rate and rhythm, normal S1 S2, no S3 or S4, no murmur, click or rub, " no peripheral edema and peripheral pulses strong  MS: no gross musculoskeletal defects noted, no edema  PSYCH: mentation appears normal, affect normal/bright    Results for orders placed or performed in visit on 11/03/23 (from the past 24 hour(s))   Hemoglobin A1c   Result Value Ref Range    Hemoglobin A1C 9.3 (H) 0.0 - 5.6 %

## 2023-11-04 DIAGNOSIS — E11.65 TYPE 2 DIABETES MELLITUS WITH HYPERGLYCEMIA, WITH LONG-TERM CURRENT USE OF INSULIN (H): ICD-10-CM

## 2023-11-04 DIAGNOSIS — Z79.4 TYPE 2 DIABETES MELLITUS WITH HYPERGLYCEMIA, WITH LONG-TERM CURRENT USE OF INSULIN (H): ICD-10-CM

## 2023-11-05 RX ORDER — SEMAGLUTIDE 0.25 MG/.5ML
0.25 INJECTION, SOLUTION SUBCUTANEOUS WEEKLY
Qty: 2 ML | Refills: 1 | Status: SHIPPED | OUTPATIENT
Start: 2023-11-05 | End: 2023-11-06

## 2023-11-06 DIAGNOSIS — E11.65 TYPE 2 DIABETES MELLITUS WITH HYPERGLYCEMIA, WITH LONG-TERM CURRENT USE OF INSULIN (H): ICD-10-CM

## 2023-11-06 DIAGNOSIS — Z79.4 TYPE 2 DIABETES MELLITUS WITH HYPERGLYCEMIA, WITH LONG-TERM CURRENT USE OF INSULIN (H): ICD-10-CM

## 2023-11-06 RX ORDER — SEMAGLUTIDE 0.25 MG/.5ML
0.25 INJECTION, SOLUTION SUBCUTANEOUS WEEKLY
Qty: 2 ML | Refills: 1 | Status: SHIPPED | OUTPATIENT
Start: 2023-11-06 | End: 2024-02-22

## 2023-11-13 ENCOUNTER — MYC MEDICAL ADVICE (OUTPATIENT)
Dept: FAMILY MEDICINE | Facility: CLINIC | Age: 43
End: 2023-11-13
Payer: COMMERCIAL

## 2023-11-13 DIAGNOSIS — Z79.4 TYPE 2 DIABETES MELLITUS WITH HYPERGLYCEMIA, WITH LONG-TERM CURRENT USE OF INSULIN (H): ICD-10-CM

## 2023-11-13 DIAGNOSIS — E11.65 TYPE 2 DIABETES MELLITUS WITH HYPERGLYCEMIA, WITH LONG-TERM CURRENT USE OF INSULIN (H): ICD-10-CM

## 2023-11-14 RX ORDER — INSULIN HUMAN 100 [IU]/ML
INJECTION, SUSPENSION SUBCUTANEOUS
Qty: 225 ML | Refills: 1 | Status: SHIPPED | OUTPATIENT
Start: 2023-11-14 | End: 2024-02-22

## 2023-11-14 NOTE — TELEPHONE ENCOUNTER
Routing to PCP    Patient requesting insulin to be 90 day supply.    Cordell Wallace, RN, BSN, PHN  [facility]

## 2023-11-30 ENCOUNTER — THERAPY VISIT (OUTPATIENT)
Dept: PHYSICAL THERAPY | Facility: CLINIC | Age: 43
End: 2023-11-30
Payer: COMMERCIAL

## 2023-11-30 DIAGNOSIS — G89.29 CHRONIC LEFT SHOULDER PAIN: Primary | ICD-10-CM

## 2023-11-30 DIAGNOSIS — M25.512 CHRONIC LEFT SHOULDER PAIN: Primary | ICD-10-CM

## 2023-11-30 PROCEDURE — 97110 THERAPEUTIC EXERCISES: CPT | Mod: GP | Performed by: PHYSICAL THERAPIST

## 2023-12-01 NOTE — PROGRESS NOTES
11/30/23 0500   Appointment Info   Signing clinician's name / credentials Kong Preciado, GEORGE   Total/Authorized Visits 22   Visits Used 20   Medical Diagnosis Chronic left shoulder pain   PT Tx Diagnosis Chronic left shoulder pain   Progress Note/Certification   Onset of illness/injury or Date of Surgery   (January 2022)   Therapy Frequency 1 time a month   Predicted Duration 1-3 months   Progress Note Due Date 11/30/23   Progress Note Completed Date 09/14/23       Present No   PT Goal 1   Goal Identifier Patient has limited ROM due to capsule tightness   Goal Description Patient will have unrestricted ROM, symmetrical to other shoulder with minimal strain at end range   Rationale to maximize safety and independence with performance of ADLs and functional tasks;to maximize safety and independence within the home;to maximize safety and independence within the community;to maximize safety and independence with self cares   Goal Progress flexion 168, Ext 70, Abd 178, ER 80, IR T 11  (has not been consistent with shoulder ROM over the past 2 weeks wiiithout loss of motion, but did not gain motion except for ER (inc from 65 to 80))   Target Date 10/20/23   Date Met 11/30/23   Subjective Report   Subjective Report Patient notes that he is not experiencing neck or  L shoulder symptoms.  He is not limiting his activity due to the L shoulder.  Able to use the arm normally throughout the day.  Is not fearful of using the L arm.  .  Mornings are better regarding his neck.  He is not feeling the cracking in his neck in the morning as often.  He is doing pilates inconsistently and hopes as time allows will be able to increase frequency and get back into the gym.  Patient notes that the neck exercise has improved the shoulder pain and range of   Objective Measures   Objective Measures Objective Measure 1;Objective Measure 2;Objective Measure 3;Objective Measure 4   Objective Measure 1   Objective Measure  AROM L shoulder   Details flexion 168, Ext 70, Abd 178, ER 80, IR T 11  (symmetrical ROM in standing)   Objective Measure 2   Objective Measure PROM L shoulder   Details PROM flexion 170, abd 178, ER 84, IR T 80 (capsular tightness noted at end range)   Objective Measure 3   Objective Measure MMT   Details 5/5 throughout the L shoulder   Objective Measure 4   Objective Measure SPADI   Details 0% (lower score is better)   Treatment Interventions (PT)   Interventions Therapeutic Procedure/Exercise   Therapeutic Procedure/Exercise   Therapeutic Procedures: strength, endurance, ROM, flexibillity minutes (18408) 30   Ther Proc 1 neck retraction with self overpressure   Ther Proc 1 - Details 10 reps - good tech - dec UT tightness, inc neck rot L with less end range strain, inc shoulder flexion and IR/Ext ROM.  imp quality of abd and ER   PTRx Ther Proc 1 Cervical Retraction With Patient Overpressure   PTRx Ther Proc 1 - Details 10 reps - decreased tightness in neck inc rotation L (less pinch) increase shoulder IR behind back and flexion improved quality of abduction ER   PTRx Ther Proc 2 Seated Cervical Retraction Extension With Overpressure   PTRx Ther Proc 2 - Details 10 reps extension - feels good to do.  further improvement in neck rotation and improved quatity of shoulder ROM10 with self o/p - pinch at end range rot L was now a stretch - inc flexion and IR ROM.  Inc quality of abduction and ER   PTRx Ther Proc 3 Cervical Side Bending With Retraction   PTRx Ther Proc 3 - Details patient to do this as needed    PTRx Ther Proc 4 Wand Shoulder Flexion Supine   PTRx Ther Proc 4 - Details prn if shoulder feels tight   PTRx Ther Proc 5 Wand Shoulder External Rotation in Neutral   PTRx Ther Proc 5 - Details prn if shoulder feels tight   PTRx Ther Proc 6 Wand Shoulder External Rotation at 90 degrees Abduction   PTRx Ther Proc 6 - Details prn if shoulder feels tight   PTRx Ther Proc 7 Shoulder Sidelying Abduction   PTRx Ther  Proc 7 - Details prn if shoulder feels tight   Skilled Intervention Has been consistent with neck ret ext with self o/p.  No changes in HEP.  Patient is independent with HEP and is continuing to improve   Patient Response/Progress better after neck extension biased ex; he is independent with the shoulder ROM and does feel confident with adding the shoulder ROM in when his neck feels more stable/shoulder ROM does not bother his neck or shoulder   Education   Learner/Method Patient;No Barriers to Learning;Pictures/Video;Demonstration;Listening   Plan   Home program see PTRx   Updates to plan of care continuing to focus on neck extension biased exercise as this has alleviated shoulder pain and improved his ROM   Plan for next session discontinue PT - discharge to indpendent HEP   Comments   Comments Patient is now independent with his home program.  He continues to gain shoulder ROM with peforming neck retraction extension with self overpressure.  He is independent with shoulder ROM exercises and can add them into his program if his shoulder feels tight.  Patient has returned to his previous level of function.         DISCHARGE  Reason for Discharge: Patient has met all goals.    Equipment Issued: none    Discharge Plan: Patient to continue home program.  Patient may seek medical care for low back pain as he has had significant lower back symptoms about 3 weeks ago, lasting about 2 weeks.     Referring Provider:  Ishmael Mccall

## 2024-01-21 PROBLEM — G89.29 CHRONIC LEFT SHOULDER PAIN: Status: RESOLVED | Noted: 2022-03-23 | Resolved: 2024-01-21

## 2024-01-21 PROBLEM — M25.512 CHRONIC LEFT SHOULDER PAIN: Status: RESOLVED | Noted: 2022-03-23 | Resolved: 2024-01-21

## 2024-02-07 ENCOUNTER — TELEPHONE (OUTPATIENT)
Dept: FAMILY MEDICINE | Facility: CLINIC | Age: 44
End: 2024-02-07
Payer: COMMERCIAL

## 2024-02-07 NOTE — TELEPHONE ENCOUNTER
Patient Quality Outreach    Patient is due for the following:   Diabetes -  A1C    Next Steps:   Patient has upcoming appointment, these items will be addressed at that time.    Patient is scheduled for 2/22/24 will address it then.    Type of outreach:    Chart review performed, no outreach needed.      Questions for provider review:    None           Zakiya Machuca CMA

## 2024-02-22 ENCOUNTER — OFFICE VISIT (OUTPATIENT)
Dept: FAMILY MEDICINE | Facility: CLINIC | Age: 44
End: 2024-02-22
Payer: COMMERCIAL

## 2024-02-22 VITALS
HEART RATE: 112 BPM | RESPIRATION RATE: 20 BRPM | HEIGHT: 73 IN | OXYGEN SATURATION: 97 % | SYSTOLIC BLOOD PRESSURE: 136 MMHG | BODY MASS INDEX: 36.45 KG/M2 | TEMPERATURE: 98.3 F | WEIGHT: 275 LBS | DIASTOLIC BLOOD PRESSURE: 80 MMHG

## 2024-02-22 DIAGNOSIS — R10.11 ABDOMINAL PAIN, RIGHT UPPER QUADRANT: ICD-10-CM

## 2024-02-22 DIAGNOSIS — E11.65 TYPE 2 DIABETES MELLITUS WITH HYPERGLYCEMIA, WITH LONG-TERM CURRENT USE OF INSULIN (H): Primary | ICD-10-CM

## 2024-02-22 DIAGNOSIS — Z79.4 TYPE 2 DIABETES MELLITUS WITH HYPERGLYCEMIA, WITH LONG-TERM CURRENT USE OF INSULIN (H): Primary | ICD-10-CM

## 2024-02-22 DIAGNOSIS — I10 HYPERTENSION GOAL BP (BLOOD PRESSURE) < 140/90: ICD-10-CM

## 2024-02-22 LAB
ALBUMIN SERPL BCG-MCNC: 4.1 G/DL (ref 3.5–5.2)
ALP SERPL-CCNC: 130 U/L (ref 40–150)
ALT SERPL W P-5'-P-CCNC: 73 U/L (ref 0–70)
ANION GAP SERPL CALCULATED.3IONS-SCNC: 13 MMOL/L (ref 7–15)
AST SERPL W P-5'-P-CCNC: 29 U/L (ref 0–45)
BILIRUB DIRECT SERPL-MCNC: <0.2 MG/DL (ref 0–0.3)
BILIRUB SERPL-MCNC: 0.5 MG/DL
BUN SERPL-MCNC: 15 MG/DL (ref 6–20)
CALCIUM SERPL-MCNC: 9.4 MG/DL (ref 8.6–10)
CHLORIDE SERPL-SCNC: 106 MMOL/L (ref 98–107)
CREAT SERPL-MCNC: 0.91 MG/DL (ref 0.67–1.17)
DEPRECATED HCO3 PLAS-SCNC: 22 MMOL/L (ref 22–29)
EGFRCR SERPLBLD CKD-EPI 2021: >90 ML/MIN/1.73M2
GLUCOSE SERPL-MCNC: 307 MG/DL (ref 70–99)
HBA1C MFR BLD: 10.9 % (ref 0–5.6)
POTASSIUM SERPL-SCNC: 4.6 MMOL/L (ref 3.4–5.3)
PROT SERPL-MCNC: 7.3 G/DL (ref 6.4–8.3)
SODIUM SERPL-SCNC: 141 MMOL/L (ref 135–145)

## 2024-02-22 PROCEDURE — 36415 COLL VENOUS BLD VENIPUNCTURE: CPT | Performed by: FAMILY MEDICINE

## 2024-02-22 PROCEDURE — 99214 OFFICE O/P EST MOD 30 MIN: CPT | Performed by: FAMILY MEDICINE

## 2024-02-22 PROCEDURE — 82248 BILIRUBIN DIRECT: CPT | Performed by: FAMILY MEDICINE

## 2024-02-22 PROCEDURE — 83036 HEMOGLOBIN GLYCOSYLATED A1C: CPT | Performed by: FAMILY MEDICINE

## 2024-02-22 PROCEDURE — 80053 COMPREHEN METABOLIC PANEL: CPT | Performed by: FAMILY MEDICINE

## 2024-02-22 RX ORDER — AMLODIPINE BESYLATE 10 MG/1
10 TABLET ORAL DAILY
Qty: 90 TABLET | Refills: 1 | Status: SHIPPED | OUTPATIENT
Start: 2024-02-22

## 2024-02-22 RX ORDER — LISINOPRIL 10 MG/1
10 TABLET ORAL DAILY
Qty: 90 TABLET | Refills: 1 | Status: SHIPPED | OUTPATIENT
Start: 2024-02-22

## 2024-02-22 RX ORDER — LIRAGLUTIDE 6 MG/ML
1.8 INJECTION SUBCUTANEOUS DAILY
Qty: 15 ML | Refills: 3 | Status: SHIPPED | OUTPATIENT
Start: 2024-02-22

## 2024-02-22 RX ORDER — INSULIN HUMAN 100 [IU]/ML
INJECTION, SUSPENSION SUBCUTANEOUS
Qty: 225 ML | Refills: 1 | Status: SHIPPED | OUTPATIENT
Start: 2024-02-22 | End: 2024-04-18

## 2024-02-22 ASSESSMENT — PAIN SCALES - GENERAL: PAINLEVEL: NO PAIN (0)

## 2024-02-22 NOTE — PROGRESS NOTES
Assessment & Plan     Type 2 diabetes mellitus with hyperglycemia, with long-term current use of insulin (H)    Diabetes is uncontrolled.  Will increase Humulin 70/30 to 60 units twice daily and recheck in 6 weeks    - Hemoglobin A1c; Future  - Hemoglobin A1c  - insulin NPH-Regular (HUMULIN MIX 70/30 KWIKPEN) susp; INJECT 60 UNITS SUB-Q BEFORE BREAKFAST AND 60 UNITS BEFORE DINNER  - metFORMIN (GLUCOPHAGE) 1000 MG tablet; Take 1 tablet (1,000 mg) by mouth 2 times daily (with meals)  - liraglutide (VICTOZA PEN) 18 MG/3ML solution; Inject 1.8 mg Subcutaneous daily    Hypertension goal BP (blood pressure) < 140/90  Patient did not take his antihypertensives today.  Will recheck in 6 weeks  - Basic metabolic panel  (Ca, Cl, CO2, Creat, Gluc, K, Na, BUN); Future  - Basic metabolic panel  (Ca, Cl, CO2, Creat, Gluc, K, Na, BUN)  - lisinopril (ZESTRIL) 10 MG tablet; Take 1 tablet (10 mg) by mouth daily  - amLODIPine (NORVASC) 10 MG tablet; Take 1 tablet (10 mg) by mouth daily    Abdominal pain, right upper quadrant    The patient's discomfort and nausea could be a really gallbladder disease.  Will do below labs although his last episode was more than 2 days ago.  Will also do ultrasound    - Hepatic panel (Albumin, ALT, AST, Bili, Alk Phos, TP); Future  - US Abdomen Limited; Future  - Hepatic panel (Albumin, ALT, AST, Bili, Alk Phos, TP)    30 minutes spent by me on the date of the encounter doing chart review, history and exam, documentation and further activities per the note  Follow-up in 6 weeks for diabetes    Subjective   Wilmer is a 43 year old, presenting for the following health issues:  Chronic Disease Management        2/22/2024     6:49 AM   Additional Questions   Roomed by Zakiya MULLINS   Accompanied by self         2/22/2024     6:49 AM   Patient Reported Additional Medications   Patient reports taking the following new medications none     History of Present Illness       Diabetes:   He presents for follow up of  diabetes.  He is checking home blood glucose a few times a month.   He checks blood glucose before meals, after meals, before and after meals and at bedtime.  Blood glucose is never over 200 and never under 70. He is aware of hypoglycemia symptoms including none.    He has no concerns regarding his diabetes at this time.   He is not experiencing numbness or burning in feet, excessive thirst, blurry vision, weight changes or redness, sores or blisters on feet.           He eats 2-3 servings of fruits and vegetables daily.He consumes 0 sweetened beverage(s) daily.He exercises with enough effort to increase his heart rate 20 to 29 minutes per day.  He exercises with enough effort to increase his heart rate 4 days per week. He is missing 2 dose(s) of medications per week.  He is not taking prescribed medications regularly due to other.     Humulin 70/30 50 units twice daily, metformin 1000 mg twice daily and Victoza 1.8 mg daily.  The patient's insurance refused to cover semaglutide.  He keeps forgetting meds when he doesn't feel good.           Lab Results   Component Value Date    A1C 10.9 02/22/2024    A1C 9.3 11/03/2023    A1C 10.9 09/21/2023    A1C 6.5 09/15/2022    A1C 8.3 06/06/2022    A1C 8.6 05/18/2021    A1C 9.9 11/27/2020    A1C 8.9 07/16/2020    A1C 7.4 03/16/2020    A1C 10.5 12/17/2019       A1c Chart     6-----135 average blood sugar   7-----170  8-----205  9-----240  10----275  11----310  12----345       Hyperlipidemia Follow-Up    Are you regularly taking any medication or supplement to lower your cholesterol?   Yes- simvastatin  Are you having muscle aches or other side effects that you think could be caused by your cholesterol lowering medication?  No  LDL Cholesterol Calculated   Date Value Ref Range Status   09/21/2023 119 (H) <=100 mg/dL Final   05/18/2021 134 (H) <100 mg/dL Final     Comment:     Above desirable:  100-129 mg/dl  Borderline High:  130-159 mg/dL  High:             160-189 mg/dL  Very  "high:       >189 mg/dl           Hypertension Follow-up    Do you check your blood pressure regularly outside of the clinic? No   Are you following a low salt diet? Yes  Are your blood pressures ever more than 140 on the top number (systolic) OR more   than 90 on the bottom number (diastolic), for example 140/90? Yes  Lisinopril 10 mg daily     -- Since about Dec has been having some GI issues-- abd pain, gassy, burping off and on and not able to pin point what is causing it -- could it be acid reflux ? It started as a possible food poisoning.    He was not eating a lot of fast food and high sugar.  The symptoms will last for hours, sometimes all night long.          Review of Systems  Constitutional, HEENT, cardiovascular, pulmonary, gi and gu systems are negative, except as otherwise noted.      Objective    BP (!) 148/84 (BP Location: Right arm, Patient Position: Sitting, Cuff Size: Adult Large)   Pulse 112   Temp 98.3  F (36.8  C) (Oral)   Resp 20   Ht 1.854 m (6' 1\")   Wt 124.7 kg (275 lb)   SpO2 97%   BMI 36.28 kg/m    Body mass index is 36.28 kg/m .  Physical Exam   GENERAL: alert and no distress  EYES: Eyes grossly normal to inspection, PERRL and conjunctivae and sclerae normal  HENT: ear canals and TM's normal, nose and mouth without ulcers or lesions  NECK: no adenopathy, no asymmetry, masses, or scars  RESP: lungs clear to auscultation - no rales, rhonchi or wheezes  CV: regular rate and rhythm, normal S1 S2, no S3 or S4, no murmur, click or rub, no peripheral edema  ABDOMEN: soft, nontender, no hepatosplenomegaly, no masses and bowel sounds normal  MS: no gross musculoskeletal defects noted, no edema  SKIN: no suspicious lesions or rashes  PSYCH: mentation appears normal, affect normal/bright    Results for orders placed or performed in visit on 02/22/24 (from the past 24 hour(s))   Hemoglobin A1c   Result Value Ref Range    Hemoglobin A1C 10.9 (H) 0.0 - 5.6 %    Narrative    Results confirmed by " repeat test.            Signed Electronically by: Tammy Guevara DO

## 2024-03-01 ENCOUNTER — ANCILLARY PROCEDURE (OUTPATIENT)
Dept: ULTRASOUND IMAGING | Facility: CLINIC | Age: 44
End: 2024-03-01
Attending: FAMILY MEDICINE
Payer: COMMERCIAL

## 2024-03-01 DIAGNOSIS — R10.11 ABDOMINAL PAIN, RIGHT UPPER QUADRANT: ICD-10-CM

## 2024-03-01 PROCEDURE — 76705 ECHO EXAM OF ABDOMEN: CPT | Mod: GC | Performed by: STUDENT IN AN ORGANIZED HEALTH CARE EDUCATION/TRAINING PROGRAM

## 2024-03-05 ENCOUNTER — TELEPHONE (OUTPATIENT)
Dept: FAMILY MEDICINE | Facility: CLINIC | Age: 44
End: 2024-03-05
Payer: COMMERCIAL

## 2024-03-05 DIAGNOSIS — K76.9 LIVER LESION: Primary | ICD-10-CM

## 2024-03-05 NOTE — TELEPHONE ENCOUNTER
Susy Guillen,    I am glad you got your abdominal ultrasound done.  The results did not show gallbladder problems but did show a lesion in your liver.  This is not likely causing your symptoms.  CT scan was recommended so I have ordered this for you.  Someone should call you to schedule.   Feel free to email or call if you have any questions.     Take care,     Tammy Guevara D.O.

## 2024-03-19 ENCOUNTER — MYC MEDICAL ADVICE (OUTPATIENT)
Dept: FAMILY MEDICINE | Facility: CLINIC | Age: 44
End: 2024-03-19
Payer: COMMERCIAL

## 2024-03-19 NOTE — TELEPHONE ENCOUNTER
RN replied to patient via [employer]. See message for details.     Cordell Wallace RN, BSN, PHN  [facility]: [place]

## 2024-03-20 ENCOUNTER — ANCILLARY PROCEDURE (OUTPATIENT)
Dept: CT IMAGING | Facility: CLINIC | Age: 44
End: 2024-03-20
Attending: FAMILY MEDICINE
Payer: COMMERCIAL

## 2024-03-20 DIAGNOSIS — K76.9 LIVER LESION: ICD-10-CM

## 2024-03-20 PROCEDURE — 74178 CT ABD&PLV WO CNTR FLWD CNTR: CPT | Performed by: STUDENT IN AN ORGANIZED HEALTH CARE EDUCATION/TRAINING PROGRAM

## 2024-03-20 RX ORDER — IOPAMIDOL 755 MG/ML
122 INJECTION, SOLUTION INTRAVASCULAR ONCE
Status: COMPLETED | OUTPATIENT
Start: 2024-03-20 | End: 2024-03-20

## 2024-03-20 RX ADMIN — IOPAMIDOL 122 ML: 755 INJECTION, SOLUTION INTRAVASCULAR at 16:53

## 2024-03-20 NOTE — DISCHARGE INSTRUCTIONS

## 2024-04-18 ENCOUNTER — TELEPHONE (OUTPATIENT)
Dept: FAMILY MEDICINE | Facility: CLINIC | Age: 44
End: 2024-04-18

## 2024-04-18 ENCOUNTER — OFFICE VISIT (OUTPATIENT)
Dept: FAMILY MEDICINE | Facility: CLINIC | Age: 44
End: 2024-04-18
Payer: COMMERCIAL

## 2024-04-18 VITALS
SYSTOLIC BLOOD PRESSURE: 148 MMHG | HEART RATE: 101 BPM | WEIGHT: 277.8 LBS | DIASTOLIC BLOOD PRESSURE: 70 MMHG | BODY MASS INDEX: 36.82 KG/M2 | TEMPERATURE: 98.3 F | OXYGEN SATURATION: 97 % | RESPIRATION RATE: 18 BRPM | HEIGHT: 73 IN

## 2024-04-18 DIAGNOSIS — Z79.4 TYPE 2 DIABETES MELLITUS WITH HYPERGLYCEMIA, WITH LONG-TERM CURRENT USE OF INSULIN (H): Primary | ICD-10-CM

## 2024-04-18 DIAGNOSIS — E11.65 TYPE 2 DIABETES MELLITUS WITH HYPERGLYCEMIA, WITH LONG-TERM CURRENT USE OF INSULIN (H): Primary | ICD-10-CM

## 2024-04-18 DIAGNOSIS — R19.5 LOOSE STOOLS: ICD-10-CM

## 2024-04-18 DIAGNOSIS — I10 HYPERTENSION GOAL BP (BLOOD PRESSURE) < 140/90: ICD-10-CM

## 2024-04-18 LAB — HBA1C MFR BLD: 8.8 % (ref 0–5.6)

## 2024-04-18 PROCEDURE — 99214 OFFICE O/P EST MOD 30 MIN: CPT | Performed by: FAMILY MEDICINE

## 2024-04-18 PROCEDURE — 83036 HEMOGLOBIN GLYCOSYLATED A1C: CPT | Performed by: FAMILY MEDICINE

## 2024-04-18 PROCEDURE — 36415 COLL VENOUS BLD VENIPUNCTURE: CPT | Performed by: FAMILY MEDICINE

## 2024-04-18 RX ORDER — INSULIN HUMAN 100 [IU]/ML
INJECTION, SUSPENSION SUBCUTANEOUS
Qty: 225 ML | Refills: 1 | Status: SHIPPED | OUTPATIENT
Start: 2024-04-18

## 2024-04-18 ASSESSMENT — PAIN SCALES - GENERAL: PAINLEVEL: NO PAIN (0)

## 2024-04-18 NOTE — PROGRESS NOTES
Assessment & Plan     Type 2 diabetes mellitus with hyperglycemia, with long-term current use of insulin (H)    Diabetes is much improved but still not optimally controlled.  Will increase his Humulin 70/30 from 60 units twice daily to 65 units twice daily.  Will have him follow-up in 3 months with one of my medical partners and see me in about 5 months    - Hemoglobin A1c; Future  - Hemoglobin A1c  - insulin NPH-Regular (HUMULIN MIX 70/30 KWIKPEN) susp; INJECT 65 UNITS SUB-Q BEFORE BREAKFAST AND 65 UNITS BEFORE DINNER    Hypertension goal BP (blood pressure) < 140/90  Controlled at home.  He has whitecoat hypertension    Loose stools  The patient is going to try increasing his fiber and taking a refrigerated probiotic.  His gallbladder will workup was negative.  The loose stools could be from the metformin even though he has tolerated it well in the past.  If he is not seeing improvement after the probiotic and fiber would consider stopping the metformin and adjusting his other medications for diabetes      30 minutes spent by me on the date of the encounter doing chart review, history and exam, documentation and further activities per the note      Follow-up in 3 months with one of my medical partners for diabetes.  Okay to do this virtually with labs prior.  Follow-up with me in 5 to 6 months in person    Raman Guillen is a 43 year old, presenting for the following health issues:  Recheck Medication (/)    History of Present Illness       Diabetes:   He presents for follow up of diabetes.   He is checking home blood glucose with a continuous glucose monitor.   He checks blood glucose before meals, after meals, before and after meals and at bedtime.  Blood glucose is sometimes over 200 and sometimes under 70.     He has no concerns regarding his diabetes at this time.   He is not experiencing numbness or burning in feet, excessive thirst, blurry vision, weight changes or redness, sores or blisters on feet.   "         Increased humulin 70/30 60 units bid about 6 weeks.      He has loose stools and is increasing his fiber.      Review of Systems  Constitutional, HEENT, cardiovascular, pulmonary, gi and gu systems are negative, except as otherwise noted.      Objective    BP (!) 148/70 (BP Location: Right arm, Patient Position: Chair, Cuff Size: Adult Large)   Pulse 101   Temp 98.3  F (36.8  C) (Oral)   Resp 18   Ht 1.854 m (6' 1\")   Wt 126 kg (277 lb 12.8 oz)   SpO2 97%   BMI 36.65 kg/m    Body mass index is 36.65 kg/m .  Physical Exam   GENERAL: alert and no distress  NECK: no adenopathy, no asymmetry, masses, or scars  RESP: lungs clear to auscultation - no rales, rhonchi or wheezes  CV: regular rate and rhythm, normal S1 S2, no S3 or S4, no murmur, click or rub, no peripheral edema  MS: no gross musculoskeletal defects noted, no edema  PSYCH: mentation appears normal, affect normal/bright    Results for orders placed or performed in visit on 04/18/24 (from the past 24 hour(s))   Hemoglobin A1c   Result Value Ref Range    Hemoglobin A1C 8.8 (H) 0.0 - 5.6 %           Signed Electronically by: Tammy Guevara DO    "

## 2024-04-18 NOTE — TELEPHONE ENCOUNTER
Patient would like future orders for chronic management labs, pt only wanted to do a 3 month virtual follow up since provider will be out.     Alize Machuca MA

## 2024-06-03 ENCOUNTER — TELEPHONE (OUTPATIENT)
Dept: FAMILY MEDICINE | Facility: CLINIC | Age: 44
End: 2024-06-03
Payer: COMMERCIAL

## 2024-06-03 NOTE — TELEPHONE ENCOUNTER
Patient Quality Outreach    Patient is due for the following:   Diabetes -  Diabetic Follow-Up Visit    Next Steps:   Patient has upcoming appointment, these items will be addressed at that time.    Type of outreach:    Chart review performed, no outreach needed.      Patient already has follow up scheduled on 7/18/24 with Alexandra Clay since PCP is out for the summer    Questions for provider review:    None           Zakiya Machuca, CHRISTOPHER

## 2024-08-22 ENCOUNTER — PATIENT OUTREACH (OUTPATIENT)
Dept: CARE COORDINATION | Facility: CLINIC | Age: 44
End: 2024-08-22
Payer: COMMERCIAL

## 2024-09-05 ENCOUNTER — PATIENT OUTREACH (OUTPATIENT)
Dept: CARE COORDINATION | Facility: CLINIC | Age: 44
End: 2024-09-05
Payer: COMMERCIAL

## 2024-09-10 ENCOUNTER — TRANSFERRED RECORDS (OUTPATIENT)
Dept: MULTI SPECIALTY CLINIC | Facility: CLINIC | Age: 44
End: 2024-09-10

## 2024-09-10 LAB — RETINOPATHY: NORMAL

## 2024-11-23 ENCOUNTER — HEALTH MAINTENANCE LETTER (OUTPATIENT)
Age: 44
End: 2024-11-23

## 2024-12-11 ENCOUNTER — OFFICE VISIT (OUTPATIENT)
Dept: FAMILY MEDICINE | Facility: CLINIC | Age: 44
End: 2024-12-11
Payer: COMMERCIAL

## 2024-12-11 VITALS
RESPIRATION RATE: 18 BRPM | BODY MASS INDEX: 38.12 KG/M2 | HEART RATE: 103 BPM | TEMPERATURE: 98.4 F | SYSTOLIC BLOOD PRESSURE: 166 MMHG | OXYGEN SATURATION: 97 % | WEIGHT: 287.6 LBS | HEIGHT: 73 IN | DIASTOLIC BLOOD PRESSURE: 96 MMHG

## 2024-12-11 DIAGNOSIS — Z79.4 TYPE 2 DIABETES MELLITUS WITH HYPERGLYCEMIA, WITH LONG-TERM CURRENT USE OF INSULIN (H): Primary | ICD-10-CM

## 2024-12-11 DIAGNOSIS — N52.9 ERECTILE DYSFUNCTION, UNSPECIFIED ERECTILE DYSFUNCTION TYPE: ICD-10-CM

## 2024-12-11 DIAGNOSIS — E78.5 HYPERLIPIDEMIA LDL GOAL <100: ICD-10-CM

## 2024-12-11 DIAGNOSIS — Z00.00 HEALTH CARE MAINTENANCE: ICD-10-CM

## 2024-12-11 DIAGNOSIS — E11.65 TYPE 2 DIABETES MELLITUS WITH HYPERGLYCEMIA, WITH LONG-TERM CURRENT USE OF INSULIN (H): Primary | ICD-10-CM

## 2024-12-11 DIAGNOSIS — Z12.5 SCREENING FOR PROSTATE CANCER: ICD-10-CM

## 2024-12-11 DIAGNOSIS — I10 HYPERTENSION GOAL BP (BLOOD PRESSURE) < 140/90: ICD-10-CM

## 2024-12-11 DIAGNOSIS — Z12.11 SCREEN FOR COLON CANCER: ICD-10-CM

## 2024-12-11 DIAGNOSIS — G47.33 OBSTRUCTIVE SLEEP APNEA SYNDROME: ICD-10-CM

## 2024-12-11 LAB
ALBUMIN SERPL BCG-MCNC: 4 G/DL (ref 3.5–5.2)
ALP SERPL-CCNC: 122 U/L (ref 40–150)
ALT SERPL W P-5'-P-CCNC: 45 U/L (ref 0–70)
ANION GAP SERPL CALCULATED.3IONS-SCNC: 13 MMOL/L (ref 7–15)
AST SERPL W P-5'-P-CCNC: 21 U/L (ref 0–45)
BILIRUB SERPL-MCNC: 0.6 MG/DL
BUN SERPL-MCNC: 14.4 MG/DL (ref 6–20)
CALCIUM SERPL-MCNC: 9.5 MG/DL (ref 8.8–10.4)
CHLORIDE SERPL-SCNC: 102 MMOL/L (ref 98–107)
CHOLEST SERPL-MCNC: 209 MG/DL
CREAT SERPL-MCNC: 0.85 MG/DL (ref 0.67–1.17)
CREAT UR-MCNC: 52.4 MG/DL
EGFRCR SERPLBLD CKD-EPI 2021: >90 ML/MIN/1.73M2
ERYTHROCYTE [DISTWIDTH] IN BLOOD BY AUTOMATED COUNT: 13.1 % (ref 10–15)
EST. AVERAGE GLUCOSE BLD GHB EST-MCNC: 272 MG/DL
FASTING STATUS PATIENT QL REPORTED: YES
FASTING STATUS PATIENT QL REPORTED: YES
GLUCOSE SERPL-MCNC: 332 MG/DL (ref 70–99)
HBA1C MFR BLD: 11.1 % (ref 0–5.6)
HCO3 SERPL-SCNC: 23 MMOL/L (ref 22–29)
HCT VFR BLD AUTO: 47.7 % (ref 40–53)
HDLC SERPL-MCNC: 35 MG/DL
HGB BLD-MCNC: 16 G/DL (ref 13.3–17.7)
LDLC SERPL CALC-MCNC: 125 MG/DL
MCH RBC QN AUTO: 28.5 PG (ref 26.5–33)
MCHC RBC AUTO-ENTMCNC: 33.5 G/DL (ref 31.5–36.5)
MCV RBC AUTO: 85 FL (ref 78–100)
MICROALBUMIN UR-MCNC: <12 MG/L
MICROALBUMIN/CREAT UR: NORMAL MG/G{CREAT}
NONHDLC SERPL-MCNC: 174 MG/DL
PLATELET # BLD AUTO: 311 10E3/UL (ref 150–450)
POTASSIUM SERPL-SCNC: 4.4 MMOL/L (ref 3.4–5.3)
PROT SERPL-MCNC: 7.2 G/DL (ref 6.4–8.3)
PSA SERPL DL<=0.01 NG/ML-MCNC: 1.39 NG/ML (ref 0–2.5)
RBC # BLD AUTO: 5.62 10E6/UL (ref 4.4–5.9)
SODIUM SERPL-SCNC: 138 MMOL/L (ref 135–145)
TRIGL SERPL-MCNC: 244 MG/DL
WBC # BLD AUTO: 8.1 10E3/UL (ref 4–11)

## 2024-12-11 PROCEDURE — 83036 HEMOGLOBIN GLYCOSYLATED A1C: CPT | Performed by: FAMILY MEDICINE

## 2024-12-11 PROCEDURE — 90656 IIV3 VACC NO PRSV 0.5 ML IM: CPT | Performed by: FAMILY MEDICINE

## 2024-12-11 PROCEDURE — 36415 COLL VENOUS BLD VENIPUNCTURE: CPT | Performed by: FAMILY MEDICINE

## 2024-12-11 PROCEDURE — 99214 OFFICE O/P EST MOD 30 MIN: CPT | Mod: 25 | Performed by: FAMILY MEDICINE

## 2024-12-11 PROCEDURE — 82570 ASSAY OF URINE CREATININE: CPT | Performed by: FAMILY MEDICINE

## 2024-12-11 PROCEDURE — 85027 COMPLETE CBC AUTOMATED: CPT | Performed by: FAMILY MEDICINE

## 2024-12-11 PROCEDURE — 80061 LIPID PANEL: CPT | Performed by: FAMILY MEDICINE

## 2024-12-11 PROCEDURE — 80053 COMPREHEN METABOLIC PANEL: CPT | Performed by: FAMILY MEDICINE

## 2024-12-11 PROCEDURE — 90471 IMMUNIZATION ADMIN: CPT | Performed by: FAMILY MEDICINE

## 2024-12-11 PROCEDURE — 91320 SARSCV2 VAC 30MCG TRS-SUC IM: CPT | Performed by: FAMILY MEDICINE

## 2024-12-11 PROCEDURE — 90480 ADMN SARSCOV2 VAC 1/ONLY CMP: CPT | Performed by: FAMILY MEDICINE

## 2024-12-11 PROCEDURE — 82043 UR ALBUMIN QUANTITATIVE: CPT | Performed by: FAMILY MEDICINE

## 2024-12-11 PROCEDURE — G0103 PSA SCREENING: HCPCS | Performed by: FAMILY MEDICINE

## 2024-12-11 RX ORDER — SIMVASTATIN 10 MG
10 TABLET ORAL AT BEDTIME
Qty: 90 TABLET | Refills: 3 | Status: SHIPPED | OUTPATIENT
Start: 2024-12-11

## 2024-12-11 RX ORDER — LISINOPRIL 10 MG/1
10 TABLET ORAL DAILY
Qty: 90 TABLET | Refills: 1 | Status: SHIPPED | OUTPATIENT
Start: 2024-12-11

## 2024-12-11 RX ORDER — LIRAGLUTIDE 6 MG/ML
1.8 INJECTION SUBCUTANEOUS DAILY
Qty: 15 ML | Refills: 3 | Status: SHIPPED | OUTPATIENT
Start: 2024-12-11

## 2024-12-11 RX ORDER — SILDENAFIL CITRATE 20 MG/1
20 TABLET ORAL DAILY PRN
Qty: 30 TABLET | Refills: 0 | Status: SHIPPED | OUTPATIENT
Start: 2024-12-11 | End: 2024-12-12

## 2024-12-11 RX ORDER — PEN NEEDLE, DIABETIC 32GX 5/32"
NEEDLE, DISPOSABLE MISCELLANEOUS
Qty: 300 EACH | Refills: 4 | Status: SHIPPED | OUTPATIENT
Start: 2024-12-11

## 2024-12-11 RX ORDER — INSULIN HUMAN 100 [IU]/ML
INJECTION, SUSPENSION SUBCUTANEOUS
Qty: 225 ML | Refills: 1 | Status: SHIPPED | OUTPATIENT
Start: 2024-12-11

## 2024-12-11 RX ORDER — AMLODIPINE BESYLATE 10 MG/1
10 TABLET ORAL DAILY
Qty: 90 TABLET | Refills: 1 | Status: SHIPPED | OUTPATIENT
Start: 2024-12-11

## 2024-12-11 NOTE — PROGRESS NOTES
Assessment & Plan     Health care maintenance  Check labs today and follow-up based on results update with flu vaccine and COVID booster  - Comprehensive metabolic panel (BMP + Alb, Alk Phos, ALT, AST, Total. Bili, TP)  - PSA, screen  - CBC with platelets  - Comprehensive metabolic panel (BMP + Alb, Alk Phos, ALT, AST, Total. Bili, TP)  - PSA, screen  - CBC with platelets    Type 2 diabetes mellitus with hyperglycemia, with long-term current use of insulin (H)  Check labs today and follow-up based on results start back on Victoza continue with metformin and insulin  Follow-up in 3 months  - Lipid panel reflex to direct LDL Non-fasting  - Albumin Random Urine Quantitative with Creat Ratio  - HEMOGLOBIN A1C  - liraglutide (VICTOZA PEN) 18 MG/3ML solution  Dispense: 15 mL; Refill: 3  - insulin NPH-Regular (HUMULIN MIX 70/30 KWIKPEN) susp  Dispense: 225 mL; Refill: 1  - BD HERRERA U/F 32G X 4 MM insulin pen needle  Dispense: 300 each; Refill: 4  - Lipid panel reflex to direct LDL Non-fasting  - Albumin Random Urine Quantitative with Creat Ratio  - HEMOGLOBIN A1C    Screen for colon cancer  Discussed options he will pursue colonoscopy  - Colonoscopy Screening  Referral    Hyperlipidemia LDL goal <100  Patient on statin therapy check lipid levels not fasting today  - simvastatin (ZOCOR) 10 MG tablet  Dispense: 90 tablet; Refill: 3    Hypertension goal BP (blood pressure) < 140/90  Start back on blood pressure medications send blood pressure readings in 10 days  - lisinopril (ZESTRIL) 10 MG tablet  Dispense: 90 tablet; Refill: 1  - amLODIPine (NORVASC) 10 MG tablet  Dispense: 90 tablet; Refill: 1    Erectile dysfunction, unspecified erectile dysfunction type  Refill sent to the pharmacy  - sildenafil (REVATIO) 20 MG tablet  Dispense: 30 tablet; Refill: 0    Screening for prostate cancer  Discuss screening for prostate cancer  - PSA, screen  - PSA, screen    Obstructive sleep apnea syndrome  Referred to sleep for  "reevaluation of CPAP need  - Adult Sleep Eval & Management  Referral            BMI  Estimated body mass index is 37.94 kg/m  as calculated from the following:    Height as of this encounter: 1.854 m (6' 1\").    Weight as of this encounter: 130.5 kg (287 lb 9.6 oz).   Weight management plan: Discussed healthy diet and exercise guidelines          Raman Guillen is a 44 year old, presenting for the following health issues:  CPAP Follow Up (Would like to get a new cpap. ) and Diabetes (No concerns-/Would like to discuss the frequency and type of insulin)        12/11/2024     6:52 AM   Additional Questions   Roomed by Raeann phelps CMA     Via the Health Maintenance questionnaire, the patient has reported the following services have been completed -Eye Exam: [facility] 2024-09-10, this information has been sent to the abstraction team.  History of Present Illness       Reason for visit:  Medication renewal, CEPAP renewal, Covid and flu shots He is missing 1 dose(s) of medications per week.  He is not taking prescribed medications regularly due to remembering to take.     Patient is here for medication check.  Patient is a uncontrolled diabetic that is having difficulty getting into his primary made appoint with us at clinic.  Patient has not been on his medication has run out of them.  He still been taking some of his insulin.  Due to some lifestyle changes in the last year with the passing of his father his glucose has not been under adequate control is not been compliant with his diabetic diet.  Discussed today getting back on his medications and reevaluating-including his blood pressure medications blood pressure is elevated but has been off his amlodipine and lisinopril for couple weeks.  He is not fasting today but would obtain blood work to get an establishment of where he is on different values including his cholesterol.  Patient is single but in a long-term relationship he works for [employer]  Recent A1c's have been above goal range over the last couple years.  We discussed colon cancer screening he will pursue colonoscopy.  He is on statin therapy we discussed goal of LDL under 100.  Again he will check blood pressure as we started back on his blood pressure medications and similar blood pressure readings in 10 days we will continue adjust lisinopril appropriately.  Requesting refills of medication of sildenafil which is used in the past for erectile dysfunction  History of sleep apnea has not been using his CPAP device for some years his weight has fluctuated quite a bit  To be referred back to sleep for evaluation for CPAP device.                Objective    BP (!) 166/96 (BP Location: Left arm, Patient Position: Sitting, Cuff Size: Adult Large)   Pulse 103   Temp 98.4  F (36.9  C) (Oral)   Resp 18   Ht 1.854 m (6' 1\")   Wt 130.5 kg (287 lb 9.6 oz)   SpO2 97%   BMI 37.94 kg/m    Body mass index is 37.94 kg/m .  Physical Exam   GENERAL: alert and no distress  EYES: Eyes grossly normal to inspection, PERRL and conjunctivae and sclerae normal  RESP: lungs clear to auscultation - no rales, rhonchi or wheezes  CV: regular rate and rhythm, normal S1 S2, no S3 or S4, no murmur, click or rub, no peripheral edema  MS: no gross musculoskeletal defects noted, no edema  NEURO: Normal strength and tone, mentation intact and speech normal  PSYCH: mentation appears normal, affect normal/bright          The longitudinal plan of care for the diagnosis(es)/condition(s) as documented were addressed during this visit. Due to the added complexity in care, I will continue to support Wilmer in the subsequent management and with ongoing continuity of care.    Signed Electronically by: Efren Crews MD    "

## 2024-12-11 NOTE — PATIENT INSTRUCTIONS
Start back on lisinopril and amlodipine- check blood pressure daily- send readings in 10 days to Dr. Crews

## 2024-12-12 ENCOUNTER — TELEPHONE (OUTPATIENT)
Dept: FAMILY MEDICINE | Facility: CLINIC | Age: 44
End: 2024-12-12
Payer: COMMERCIAL

## 2024-12-12 ENCOUNTER — MYC MEDICAL ADVICE (OUTPATIENT)
Dept: FAMILY MEDICINE | Facility: CLINIC | Age: 44
End: 2024-12-12
Payer: COMMERCIAL

## 2024-12-12 DIAGNOSIS — N52.9 ERECTILE DYSFUNCTION, UNSPECIFIED ERECTILE DYSFUNCTION TYPE: ICD-10-CM

## 2024-12-12 RX ORDER — SILDENAFIL CITRATE 20 MG/1
20 TABLET ORAL DAILY PRN
Qty: 30 TABLET | Refills: 0 | Status: SHIPPED | OUTPATIENT
Start: 2024-12-12

## 2024-12-16 ENCOUNTER — TELEPHONE (OUTPATIENT)
Dept: FAMILY MEDICINE | Facility: CLINIC | Age: 44
End: 2024-12-16
Payer: COMMERCIAL

## 2024-12-16 NOTE — TELEPHONE ENCOUNTER
Patient Quality Outreach    Patient is due for the following:   Diabetes -  Diabetic Follow-Up Visit  Colon Cancer Screening  Physical Preventive Adult Physical    Action(s) Taken:   Schedule a Adult Preventative    Type of outreach:      Patient was seen through [facility] 12/11/24 for diabetes check- looks like he already has follow up scheduled with them again March 2025.      Questions for provider review:    None           Zakiya Machuca, CMA

## 2024-12-23 DIAGNOSIS — Z79.4 TYPE 2 DIABETES MELLITUS WITH HYPERGLYCEMIA, WITH LONG-TERM CURRENT USE OF INSULIN (H): ICD-10-CM

## 2024-12-23 DIAGNOSIS — E11.65 TYPE 2 DIABETES MELLITUS WITH HYPERGLYCEMIA, WITH LONG-TERM CURRENT USE OF INSULIN (H): ICD-10-CM

## 2024-12-23 DIAGNOSIS — E78.2 MIXED HYPERLIPIDEMIA: Primary | ICD-10-CM

## 2024-12-23 RX ORDER — SIMVASTATIN 20 MG
20 TABLET ORAL AT BEDTIME
Qty: 90 TABLET | Refills: 3 | Status: SHIPPED | OUTPATIENT
Start: 2024-12-23

## 2025-01-30 DIAGNOSIS — I10 HYPERTENSION GOAL BP (BLOOD PRESSURE) < 140/90: ICD-10-CM

## 2025-01-30 RX ORDER — LISINOPRIL 10 MG/1
20 TABLET ORAL DAILY
Status: SHIPPED
Start: 2025-01-30

## 2025-02-23 NOTE — TELEPHONE ENCOUNTER
Routing refill request to provider for review/approval because:  Ok to send for 90 day fill    75ml      Rylee Ramirez RN     15

## 2025-03-03 ENCOUNTER — TELEPHONE (OUTPATIENT)
Dept: GASTROENTEROLOGY | Facility: CLINIC | Age: 45
End: 2025-03-03
Payer: COMMERCIAL

## 2025-03-03 DIAGNOSIS — Z12.11 ENCOUNTER FOR SCREENING COLONOSCOPY: Primary | ICD-10-CM

## 2025-03-03 RX ORDER — BISACODYL 5 MG/1
TABLET, DELAYED RELEASE ORAL
Qty: 4 TABLET | Refills: 0 | Status: SHIPPED | OUTPATIENT
Start: 2025-03-03

## 2025-03-03 NOTE — TELEPHONE ENCOUNTER
Attempted to contact patient in order to complete pre assessment questions.     No answer. Left message to return call to [phone] option 3.    Callback communication sent via [employer].    Ra Michelle RN

## 2025-03-03 NOTE — TELEPHONE ENCOUNTER
Pre visit planning completed.      Procedure details:    Patient scheduled for Colonoscopy on 3/14/25.     Arrival time: 0830. Procedure time 0930    Facility location: [facility]; [address], [address], [place] [address]. Check in location: Main entrance at registration desk.  *Disclaimer: Drivers are to check in with patient and stay on campus during procedure.     Sedation type: Conscious sedation     Pre op exam needed? No.    Indication for procedure: Screen      Chart review:     Electronic implanted devices? No    Recent diagnosis of diverticulitis within the last 6 weeks? No      Medication review:    Diabetic? Yes. Oral diabetic medications: Metformin (glucophage): HOLD day of procedure.  Diabetic injectables: Victoza (Saxenda, Liraglutide,). Daily or BID dosing of medication.  HOLD the day before and day of procedure.  Follow up with managing provider.   Insulin: Consult with managing provider.     Anticoagulants? No    Weight loss medication/injectable? No. Patient is on GLP-1 medication but for DM (see above).    Other medication HOLDING recommendations:  N/A      Prep for procedure:     Bowel prep recommendation: Standard Golytely. Bowel prep sent to     [place] [id number] [place] [id number]    Due to: diabetes    Procedure information and instructions sent via sierra Michelle RN  Endoscopy Procedure Pre Assessment   [phone] option 3

## 2025-03-04 NOTE — TELEPHONE ENCOUNTER
Pre assessment completed for upcoming procedure.   (Please see previous telephone encounter notes for complete details)    Patient returned call.       Procedure details:    Arrival time and facility location reviewed.    Pre op exam needed? No.    Designated  policy reviewed and that site requests drivers to check in and stay on campus. Instructed to have someone stay 6  hours post procedure.   *Disclaimer - please notify the [facility] Op Manager with any  issues/concerns.    Medication review:    Medications reviewed. Please see supporting documentation below. Holding recommendations discussed (if applicable).       Prep for procedure:    Procedure prep instructions reviewed.        Any additional information needed:  N/A      Patient verbalized understanding and had no questions or concerns at this time.      Geovanna White RN  Endoscopy Procedure Pre Assessment   [phone] option 3

## 2025-03-12 ENCOUNTER — OFFICE VISIT (OUTPATIENT)
Dept: FAMILY MEDICINE | Facility: CLINIC | Age: 45
End: 2025-03-12
Payer: COMMERCIAL

## 2025-03-12 VITALS
DIASTOLIC BLOOD PRESSURE: 85 MMHG | HEIGHT: 74 IN | WEIGHT: 285.2 LBS | RESPIRATION RATE: 18 BRPM | SYSTOLIC BLOOD PRESSURE: 143 MMHG | BODY MASS INDEX: 36.6 KG/M2 | TEMPERATURE: 98.3 F | HEART RATE: 89 BPM | OXYGEN SATURATION: 95 %

## 2025-03-12 DIAGNOSIS — I10 PRIMARY HYPERTENSION: ICD-10-CM

## 2025-03-12 DIAGNOSIS — E78.2 MIXED HYPERLIPIDEMIA: ICD-10-CM

## 2025-03-12 DIAGNOSIS — E11.65 TYPE 2 DIABETES MELLITUS WITH HYPERGLYCEMIA, WITH LONG-TERM CURRENT USE OF INSULIN (H): Primary | ICD-10-CM

## 2025-03-12 DIAGNOSIS — Z79.4 TYPE 2 DIABETES MELLITUS WITH HYPERGLYCEMIA, WITH LONG-TERM CURRENT USE OF INSULIN (H): Primary | ICD-10-CM

## 2025-03-12 LAB
EST. AVERAGE GLUCOSE BLD GHB EST-MCNC: 174 MG/DL
HBA1C MFR BLD: 7.7 % (ref 0–5.6)

## 2025-03-12 PROCEDURE — 83036 HEMOGLOBIN GLYCOSYLATED A1C: CPT | Performed by: FAMILY MEDICINE

## 2025-03-12 PROCEDURE — 99214 OFFICE O/P EST MOD 30 MIN: CPT | Performed by: FAMILY MEDICINE

## 2025-03-12 PROCEDURE — G2211 COMPLEX E/M VISIT ADD ON: HCPCS | Performed by: FAMILY MEDICINE

## 2025-03-12 PROCEDURE — 3077F SYST BP >= 140 MM HG: CPT | Performed by: FAMILY MEDICINE

## 2025-03-12 PROCEDURE — 36415 COLL VENOUS BLD VENIPUNCTURE: CPT | Performed by: FAMILY MEDICINE

## 2025-03-12 PROCEDURE — 3079F DIAST BP 80-89 MM HG: CPT | Performed by: FAMILY MEDICINE

## 2025-03-12 NOTE — PROGRESS NOTES
Assessment & Plan     Type 2 diabetes mellitus with hyperglycemia, with long-term current use of insulin (H)  A1c returns at 7.7% large improvement from previous 3 months ago that was greater than 11  Continue with current modalities at this time and recommend follow-up in 6 months  - Hemoglobin A1c  - Hemoglobin A1c    Primary hypertension  Blood pressure slightly elevated on recheck  Patient did not take his blood pressure medicine this morning  He will continue with his current medication and monitor he states that he has been running in the 130s at home when he has checked    Mixed hyperlipidemia  Reviewed cholesterol levels he is currently on simvastatin 20 mg  Discussed rechecking cholesterol levels at his next blood draw  Discussed his ratio between total cholesterol and HDL and try to improve that ratio  Discussed diet and exercise                  Raman Guillen is a 44 year old, presenting for the following health issues:  Results (3 mo check, diabetic foot check, go over labs, possible medication changes)        3/12/2025     7:12 AM   Additional Questions   Roomed by Frida ARBOLEDA CMA   Accompanied by self     History of Present Illness       Diabetes:   He presents for follow up of diabetes.   He is checking home blood glucose with a continuous glucose monitor.   He checks blood glucose before meals, after meals, before and after meals and at bedtime.  Blood glucose is sometimes over 200 and sometimes under 70.  When his blood glucose is low, the patient is asymptomatic for confusion, blurred vision, lethargy and reports not feeling dizzy, shaky, or weak.   He has no concerns regarding his diabetes at this time.   He is not experiencing numbness or burning in feet, excessive thirst, blurry vision, weight changes or redness, sores or blisters on feet.           He eats 2-3 servings of fruits and vegetables daily.He consumes 0 sweetened beverage(s) daily.He exercises with enough effort to increase his  "heart rate 10 to 19 minutes per day.  He exercises with enough effort to increase his heart rate 3 or less days per week.   He is taking medications regularly.      Here for diabetic follow-up  Patient's previous A1c greater than 11% checked in December.  Patient had not been on all of his medications he has not been taking his Victoza-now that his been back on his Victoza and taking his insulin we reviewed his glucose levels on his phone and they have been running much lower but still having some elevations.  Patient does mention how he sometimes would not take all of his insulin during the day based on his glucose levels and his diet.  Discussed with him what basal insulin is and the need for even when we are not eating.  Blood pressure slightly elevated has been running normal at home he did not take his blood pressure medicines this morning we will continue with current dosing.  He is on statin therapy elevated levels back in December discussed lows and the ratio between total cholesterol and HDL and the need for improvement.  He like to recheck at his next blood draw if elevated consider increasing dosing on statin versus changing to a more potent statin.    Discussed diet and exercise.  Patient like to continue with current regimen and monitor.              Objective    BP (!) 143/85   Pulse 89   Temp 98.3  F (36.8  C) (Oral)   Resp 18   Ht 1.875 m (6' 1.82\")   Wt 129.4 kg (285 lb 3.2 oz)   SpO2 95%   BMI 36.80 kg/m    Body mass index is 36.8 kg/m .  Physical Exam   GENERAL: alert and no distress  EYES: Eyes grossly normal to inspection, PERRL and conjunctivae and sclerae normal  RESP: lungs clear to auscultation - no rales, rhonchi or wheezes  CV: regular rate and rhythm, normal S1 S2, no S3 or S4, no murmur, click or rub, no peripheral edema  MS: no gross musculoskeletal defects noted, no edema  NEURO: Normal strength and tone, mentation intact and speech normal  PSYCH: mentation appears normal, " affect normal/bright  Diabetic foot exam: normal DP and PT pulses, no trophic changes or ulcerative lesions, and normal sensory exam          The longitudinal plan of care for the diagnosis(es)/condition(s) as documented were addressed during this visit. Due to the added complexity in care, I will continue to support Wilmer in the subsequent management and with ongoing continuity of care.  Signed Electronically by: Efren Crews MD

## 2025-03-14 ENCOUNTER — HOSPITAL ENCOUNTER (OUTPATIENT)
Facility: CLINIC | Age: 45
Discharge: HOME | End: 2025-03-14
Attending: INTERNAL MEDICINE | Admitting: INTERNAL MEDICINE
Payer: COMMERCIAL

## 2025-03-14 VITALS
RESPIRATION RATE: 16 BRPM | SYSTOLIC BLOOD PRESSURE: 132 MMHG | OXYGEN SATURATION: 95 % | HEART RATE: 91 BPM | DIASTOLIC BLOOD PRESSURE: 89 MMHG

## 2025-03-14 LAB
COLONOSCOPY: NORMAL
GLUCOSE BLDC GLUCOMTR-MCNC: 213 MG/DL (ref 70–99)

## 2025-03-14 PROCEDURE — 45378 DIAGNOSTIC COLONOSCOPY: CPT | Performed by: INTERNAL MEDICINE

## 2025-03-14 PROCEDURE — G0500 MOD SEDAT ENDO SERVICE >5YRS: HCPCS | Performed by: INTERNAL MEDICINE

## 2025-03-14 PROCEDURE — G0121 COLON CA SCRN NOT HI RSK IND: HCPCS | Performed by: INTERNAL MEDICINE

## 2025-03-14 PROCEDURE — 82962 GLUCOSE BLOOD TEST: CPT

## 2025-03-14 PROCEDURE — 250N000011 HC RX IP 250 OP 636: Performed by: INTERNAL MEDICINE

## 2025-03-14 RX ORDER — NALOXONE HYDROCHLORIDE 0.4 MG/ML
0.4 INJECTION, SOLUTION INTRAMUSCULAR; INTRAVENOUS; SUBCUTANEOUS
Status: DISCONTINUED | OUTPATIENT
Start: 2025-03-14 | End: 2025-03-14 | Stop reason: HOSPADM

## 2025-03-14 RX ORDER — ONDANSETRON 4 MG/1
4 TABLET, ORALLY DISINTEGRATING ORAL EVERY 6 HOURS PRN
Status: DISCONTINUED | OUTPATIENT
Start: 2025-03-14 | End: 2025-03-14 | Stop reason: HOSPADM

## 2025-03-14 RX ORDER — LIDOCAINE 40 MG/G
CREAM TOPICAL
Status: DISCONTINUED | OUTPATIENT
Start: 2025-03-14 | End: 2025-03-14 | Stop reason: HOSPADM

## 2025-03-14 RX ORDER — NALOXONE HYDROCHLORIDE 0.4 MG/ML
0.2 INJECTION, SOLUTION INTRAMUSCULAR; INTRAVENOUS; SUBCUTANEOUS
Status: DISCONTINUED | OUTPATIENT
Start: 2025-03-14 | End: 2025-03-14 | Stop reason: HOSPADM

## 2025-03-14 RX ORDER — FLUMAZENIL 0.1 MG/ML
0.2 INJECTION, SOLUTION INTRAVENOUS
Status: DISCONTINUED | OUTPATIENT
Start: 2025-03-14 | End: 2025-03-14 | Stop reason: HOSPADM

## 2025-03-14 RX ORDER — FENTANYL CITRATE 50 UG/ML
INJECTION, SOLUTION INTRAMUSCULAR; INTRAVENOUS PRN
Status: DISCONTINUED | OUTPATIENT
Start: 2025-03-14 | End: 2025-03-14 | Stop reason: HOSPADM

## 2025-03-14 RX ORDER — ONDANSETRON 2 MG/ML
4 INJECTION INTRAMUSCULAR; INTRAVENOUS
Status: DISCONTINUED | OUTPATIENT
Start: 2025-03-14 | End: 2025-03-14 | Stop reason: HOSPADM

## 2025-03-14 RX ORDER — PROCHLORPERAZINE MALEATE 5 MG/1
10 TABLET ORAL EVERY 6 HOURS PRN
Status: DISCONTINUED | OUTPATIENT
Start: 2025-03-14 | End: 2025-03-14 | Stop reason: HOSPADM

## 2025-03-14 RX ORDER — ONDANSETRON 2 MG/ML
4 INJECTION INTRAMUSCULAR; INTRAVENOUS EVERY 6 HOURS PRN
Status: DISCONTINUED | OUTPATIENT
Start: 2025-03-14 | End: 2025-03-14 | Stop reason: HOSPADM

## 2025-03-14 ASSESSMENT — ACTIVITIES OF DAILY LIVING (ADL)
ADLS_ACUITY_SCORE: 41
ADLS_ACUITY_SCORE: 41

## 2025-03-14 NOTE — OR NURSING
Pt underwent colonoscopy under conscious sedation. Specimens: none. Pt transferred to recovery and report given to endo RN.       Maggi Bean RN

## 2025-03-14 NOTE — H&P
ENDOSCOPY PRE-SEDATION H&P FOR OUTPATIENT PROCEDURES    Wilmer Rizvi  [id number]  1980    Procedure: colonoscopy    Pre-procedure diagnosis: screening    Past medical history:   Past Medical History:   Diagnosis Date    Diabetes (H) 2012    Hypertension goal BP (blood pressure) < 140/90        Past surgical history:   Past Surgical History:   Procedure Laterality Date    SURGICAL HISTORY OF -       Groin Cyst removal 2012       Current Facility-Administered Medications   Medication Dose Route Frequency Provider Last Rate Last Admin    lidocaine (LMX4) cream   Topical Q1H PRN Gonzalo Leblanc MD        lidocaine 1 % 0.1-1 mL  0.1-1 mL Other Q1H PRN Gonzalo Leblanc MD        ondansetron (ZOFRAN) injection 4 mg  4 mg Intravenous Once PRN Gonzalo Leblanc MD        sodium chloride (PF) 0.9% PF flush 3 mL  3 mL Intracatheter Q8H Gonzalo Leblanc MD        sodium chloride (PF) 0.9% PF flush 3 mL  3 mL Intracatheter q1 min prn Gonzalo Leblanc MD           Allergies   Allergen Reactions    Amoxicillin Rash     Rash       History of Anesthesia/Sedation Problems: no    PHYSICAL EXAMINATION:  Constitutional: aaox3, cooperative, pleasant  Vitals reviewed: BP (!) 150/84 (BP Location: Left arm)   Pulse 86   Resp 18   SpO2 96%   Wt:   Wt Readings from Last 2 Encounters:   03/12/25 129.4 kg (285 lb 3.2 oz)   12/11/24 130.5 kg (287 lb 9.6 oz)      Eyes: Sclera anicteric/injected  Ears/nose/mouth/throat: Normal oropharynx without ulcers or exudate, mucus membranes moist, hearing intact  Neck: supple, thyroid normal size  CV: No edema  Respiratory: Unlabored breathing  Lymph: No submandibular, supraclavicular or inguinal lymphadenopathy  Abd: Nondistended, no masses, nontender  Skin: warm, perfused, no jaundice  Psych: Normal affect  MSK: normal movement on limited exam.    ASA Score: See Provation note    Assessment/Plan:     The patient is an appropriate candidate to  receive sedation.    Informed consent was discussed with the patient/family, including the risks, benefits, potential complications and any alternative options associated with sedation.    Patient assessment completed just prior to sedation and while under constant observation by the provider. Condition determined to be adequate for proceeding with sedation.    The specific risks for the procedure were discussed with the patient at the time of informed consent and include but are not limited to perforation which could require surgery, missing significant neoplasm or lesion, hemorrhage and adverse sedative complication.      Gonzalo Leblanc MD

## 2025-03-20 ENCOUNTER — PATIENT OUTREACH (OUTPATIENT)
Dept: CARE COORDINATION | Facility: CLINIC | Age: 45
End: 2025-03-20
Payer: COMMERCIAL

## 2025-03-22 PROBLEM — E66.01 MORBID OBESITY (H): Chronic | Status: ACTIVE | Noted: 2021-09-10

## 2025-03-25 ENCOUNTER — VIRTUAL VISIT (OUTPATIENT)
Dept: SLEEP MEDICINE | Facility: CLINIC | Age: 45
End: 2025-03-25
Attending: FAMILY MEDICINE
Payer: COMMERCIAL

## 2025-03-25 VITALS — BODY MASS INDEX: 35.94 KG/M2 | WEIGHT: 280 LBS | HEIGHT: 74 IN

## 2025-03-25 DIAGNOSIS — G47.33 OBSTRUCTIVE SLEEP APNEA SYNDROME: Primary | ICD-10-CM

## 2025-03-25 PROCEDURE — 98002 SYNCH AUDIO-VIDEO NEW MOD 45: CPT | Performed by: PHYSICIAN ASSISTANT

## 2025-03-25 PROCEDURE — 1126F AMNT PAIN NOTED NONE PRSNT: CPT | Mod: 95 | Performed by: PHYSICIAN ASSISTANT

## 2025-03-25 ASSESSMENT — SLEEP AND FATIGUE QUESTIONNAIRES
HOW LIKELY ARE YOU TO NOD OFF OR FALL ASLEEP WHILE SITTING AND READING: SLIGHT CHANCE OF DOZING
HOW LIKELY ARE YOU TO NOD OFF OR FALL ASLEEP IN A CAR, WHILE STOPPED FOR A FEW MINUTES IN TRAFFIC: WOULD NEVER DOZE
HOW LIKELY ARE YOU TO NOD OFF OR FALL ASLEEP WHILE WATCHING TV: SLIGHT CHANCE OF DOZING
HOW LIKELY ARE YOU TO NOD OFF OR FALL ASLEEP WHILE SITTING AND TALKING TO SOMEONE: WOULD NEVER DOZE
HOW LIKELY ARE YOU TO NOD OFF OR FALL ASLEEP WHILE SITTING QUIETLY AFTER LUNCH WITHOUT ALCOHOL: WOULD NEVER DOZE
HOW LIKELY ARE YOU TO NOD OFF OR FALL ASLEEP WHEN YOU ARE A PASSENGER IN A CAR FOR AN HOUR WITHOUT A BREAK: WOULD NEVER DOZE
HOW LIKELY ARE YOU TO NOD OFF OR FALL ASLEEP WHILE LYING DOWN TO REST IN THE AFTERNOON WHEN CIRCUMSTANCES PERMIT: HIGH CHANCE OF DOZING
HOW LIKELY ARE YOU TO NOD OFF OR FALL ASLEEP WHILE SITTING INACTIVE IN A PUBLIC PLACE: WOULD NEVER DOZE

## 2025-03-25 ASSESSMENT — PAIN SCALES - GENERAL: PAINLEVEL_OUTOF10: NO PAIN (0)

## 2025-03-25 NOTE — PROGRESS NOTES
Virtual Visit Details    Type of service:  Video Visit     Originating Location (pt. Location): Home    Distant Location (provider location):  On-site  Platform used for Video Visit: [facility]    Outpatient Sleep Medicine Consultation:      Name: Wilmer Rizvi MRN# [id number]   Age: 44 year old YOB: 1980     Date of Consultation: March 25, 2025  Consultation is requested by: Efren Crews MD  [address] [place] [address] Efren Crews  Primary care provider: Efren Crews       Reason for Sleep Consult:     Wilmer Rizvi is sent by Efren Crews for a sleep consultation regarding sleep apnea.    Patient s Reason for visit  Wilmer Rizvi main reason for visit: (Patient-Rptd) Need to get my cpap machine working again  Patient states problem(s) started:    Wilmer Rizvi's goals for this visit: (Patient-Rptd) Cepa machine           Assessment and Plan:     Summary Sleep Diagnoses & Recommendations:   History of mild obstructive sleep apnea by a sleep study in 2013. obstructive sleep apnea is likely more severe with interim weight gain of 20 lbs.  He has been using a loaner machine. Daytime symptoms have improved with recent use of CPAP.   He wants to obtain a new machine.   Comprehensive DME order placed.       Comorbid Diagnoses:  DM type 2  Hypertension       Summary Recommendations:  Orders Placed This Encounter   Procedures    Comprehensive DME     Summary Counseling:    Old Sleep Testing Reviewed  Obstructive Sleep Apnea Reviewed  Complications of Untreated Sleep Apnea Reviewed      Medical Decision-making:   Educational materials provided in instructions    Total time spent reviewing medical records, history and physical examination, review of previous testing and interpretation as well as documentation on this date:45 minutes    CC: Efren Crews          History of Present Illness:     Past Sleep Evaluations:  Wilmer is a 45 YO male with a PMH of HTN, hyperlipidemia,  T2DM, and allergic rhinitis.  Patient was originally seen for complaints of snoring, intermittent non-restorative sleep in the setting of obesity, HTN, T2DM, increase neck circumference, and family hx of sleep apnea.    Polysomnogram was recommended and completed on 8/26/2013.  During the study night the patient was found to have mild HANNAH (AHI 6.2, Supine AHI 16.6, RDI 13.6, O2 jayla 90%).  PLM index was 22, PLMAI was 2.2.     Weight at time of study was 259 pounds.  Weight today is:  280  pounds.    He stopped using CPAP and recently started with worsening symptoms. He reports benefiting from CPAP.     SLEEP-WAKE SCHEDULE:     Work/School Days: Patient goes to school/work: (Patient-Rptd) Yes   Usually gets into bed at (Patient-Rptd) 9:30  Takes patient about (Patient-Rptd) varries to fall asleep  Has trouble falling asleep (Patient-Rptd) 2 nights per week  Wakes up in the middle of the night (Patient-Rptd) 1-2 times.  Wakes up due to (Patient-Rptd) External stimuli (bed partner, pets, noise, etc);Use the bathroom  He has trouble falling back asleep (Patient-Rptd) 1-2 times a week.   It usually takes (Patient-Rptd) varries to get back to sleep  Patient is usually up at (Patient-Rptd) 5:00  Uses alarm: (Patient-Rptd) Yes    Weekends/Non-work Days/All Other Days:  Usually gets into bed at (Patient-Rptd) 10   Takes patient about (Patient-Rptd) varries to fall asleep  Patient is usually up at (Patient-Rptd) 7  Uses alarm: (Patient-Rptd) Yes    Sleep Need  Patient gets  (Patient-Rptd) 6.5-7 sleep on average   Patient thinks he needs about (Patient-Rptd) 8-9 sleep    Wilmer SHEARER Dmitri prefers to sleep in this position(s): (Patient-Rptd) Side;Head Elevated   Patient states they do the following activities in bed: (Patient-Rptd) Read;Watch TV;Use phone, computer, or tablet    Naps  Patient takes a purposeful nap (Patient-Rptd) 1-2 times a week and naps are usually (Patient-Rptd) Varries 1-2 hours in duration  He feels better  after a nap: (Patient-Rptd) Yes  He dozes off unintentionally (Patient-Rptd) 1-2 days per week  Patient has had a driving accident or near-miss due to sleepiness/drowsiness: (Patient-Rptd) No      SLEEP DISRUPTIONS:    Breathing/Snoring  Patient snores:(Patient-Rptd) Yes  Other people complain about his snoring: (Patient-Rptd) Yes  Patient has been told he stops breathing in his sleep:(Patient-Rptd) Yes  He has issues with the following: (Patient-Rptd) Morning mouth dryness;Stuffy nose when you wake up;Heartburn or reflux at night    Movement:  Patient gets pain, discomfort, with an urge to move:  (Patient-Rptd) No  It happens when he is resting:  (Patient-Rptd) No  It happens more at night:  (Patient-Rptd) No  Patient has been told he kicks his legs at night:  (Patient-Rptd) Yes     Behaviors in Sleep:  Wilmer Rizvi has experienced the following behaviors while sleeping:    He has experienced sudden muscle weakness during the day: (Patient-Rptd) No      Is there anything else you would like your sleep provider to know:          CAFFEINE AND OTHER SUBSTANCES:    Patient consumes caffeinated beverages per day:  (Patient-Rptd) 2  Last caffeine use is usually: (Patient-Rptd) 6 pm  List of any prescribed or over the counter stimulants that patient takes:    List of any prescribed or over the counter sleep medication patient takes: (Patient-Rptd) no  List of previous sleep medications that patient has tried: (Patient-Rptd) no  Patient drinks alcohol to help them sleep: (Patient-Rptd) Yes  Patient drinks alcohol near bedtime: (Patient-Rptd) Yes    Family History:  Patient has a family member been diagnosed with a sleep disorder: (Patient-Rptd) Yes  (Patient-Rptd) mother         SCALES:    EPWORTH SLEEPINESS SCALE         3/25/2025     6:37 AM    [place] Sleepiness Scale Naga Crook  [id number]<br>ESS - [place]/English - Final version - 21 Nov 07 - [facility] [place].)   Sitting and reading Slight chance of dozing    Watching TV Slight chance of dozing   Sitting, inactive in a public place (e.g. a theatre or a meeting) Would never doze   As a passenger in a car for an hour without a break Would never doze   Lying down to rest in the afternoon when circumstances permit High chance of dozing   Sitting and talking to someone Would never doze   Sitting quietly after a lunch without alcohol Would never doze   In a car, while stopped for a few minutes in traffic Would never doze   Street Score (MC) 5   Street Score (Sleep) 5        Patient-reported         INSOMNIA SEVERITY INDEX (SIMA)          3/25/2025     6:25 AM   Insomnia Severity Index (SIMA)   Difficulty falling asleep 2   Difficulty staying asleep 2   Problems waking up too early 2   How SATISFIED/DISSATISFIED are you with your CURRENT sleep pattern? 3   How NOTICEABLE to others do you think your sleep problem is in terms of impairing the quality of your life? 3   How WORRIED/DISTRESSED are you about your current sleep problem? 2   To what extent do you consider your sleep problem to INTERFERE with your daily functioning (e.g. daytime fatigue, mood, ability to function at work/daily chores, concentration, memory, mood, etc.) CURRENTLY? 2   SIMA Total Score 16        Patient-reported       Guidelines for Scoring/Interpretation:  Total score categories:  0-7 = No clinically significant insomnia   8-14 = Subthreshold insomnia   15-21 = Clinical insomnia (moderate severity)  22-28 = Clinical insomnia (severe)  Used via courtesy of www.[employer] Therapeuticsealth.va.gov with permission from Srinath Bell PhD., [facility]      STOP BANG         3/25/2025     7:43 AM   STOP BANG Questionnaire (  2008, the American Society of Anesthesiologists, Inc. [employer]   B/P Clinic: --   BMI Clinic: 35.95         GAD7         No data to display                  CAGE-AID         No data to display                CAGE-AID reprinted with permission from the [facility]  "Journal, JAHAIRA Rizvi. and FERNANDA Lyons, \"Conjoint screening questionnaires for alcohol and drug abuse\" [place] Medical Journal 94: 135-140, 1995.      PATIENT HEALTH QUESTIONNAIRE-9 (PHQ - 9)         No data to display                Developed by Kristyn Harper, Jerilyn Harris, Yossi Deluna and colleagues, with an educational mateo from [employer] No permission required to reproduce, translate, display or distribute.        Allergies:    Allergies   Allergen Reactions    Amoxicillin Rash     Rash       Medications:    Current Outpatient Medications   Medication Sig Dispense Refill    amLODIPine (NORVASC) 10 MG tablet Take 1 tablet (10 mg) by mouth daily. 90 tablet 1    BD HERRERA U/F 32G X 4 MM insulin pen needle Use 3 pen needles daily or as directed. 300 each 4    bisacodyl (DULCOLAX) 5 MG EC tablet Take 2 tablets at 3 pm the day before your procedure. If your procedure is before 11 am, take 2 additional tablets at 11 pm. If your procedure is after 11 am, take 2 additional tablets at 6 am. For additional instructions refer to your colonoscopy prep instructions. 4 tablet 0    Continuous Glucose Sensor (FREESTYLE GAYLE 2 SENSOR) MISC 1 each every 14 days. Use 1 sensor every 14 days. Use to read blood sugars per 's instructions. 2 each 5    fexofenadine (ALLEGRA) 180 MG tablet Take 180 mg by mouth daily      fish oil-omega-3 fatty acids 1000 MG capsule Take 2 g by mouth daily Take one tablet twice daily      fluticasone (FLONASE) 50 MCG/ACT nasal spray Spray 1 spray into both nostrils daily      insulin NPH-Regular (HUMULIN MIX 70/30 KWIKPEN) susp INJECT 65 UNITS SUB-Q BEFORE BREAKFAST AND 65 UNITS BEFORE DINNER 225 mL 1    liraglutide (VICTOZA PEN) 18 MG/3ML solution Inject 1.8 mg subcutaneously daily. 15 mL 3    lisinopril (ZESTRIL) 10 MG tablet Take 2 tablets (20 mg) by mouth daily.      metFORMIN (GLUCOPHAGE) 1000 MG tablet Take 1 tablet (1,000 mg) by mouth 2 times daily (with meals) 180 " tablet 1    polyethylene glycol (GOLYTELY) 236 g suspension The night before the exam at 6 pm drink an 8-ounce glass every 15 minutes until the jug is half empty. If you arrive before 11 AM: Drink the other half of the Golytely jug at 11 PM night before procedure. If you arrive after 11 AM: Drink the other half of the Golytely jug at 6 AM day of procedure. For additional instructions refer to your colonoscopy prep instructions. 4000 mL 0    sildenafil (REVATIO) 20 MG tablet Take 1 tablet (20 mg) by mouth daily as needed (Take 1 tablet by mouth daily as needed). 30 tablet 0    simvastatin (ZOCOR) 20 MG tablet Take 1 tablet (20 mg) by mouth at bedtime. 90 tablet 3       Problem List:  Patient Active Problem List    Diagnosis Date Noted    Type 2 diabetes mellitus with hyperglycemia, with long-term current use of insulin (H) 12/21/2011     Priority: High    Morbid obesity (H) 09/10/2021     Priority: Medium    Hyperlipidemia LDL goal <100 01/23/2012     Priority: Medium    Hypertension goal BP (blood pressure) < 140/90 01/23/2012     Priority: Medium    Environmental allergies 01/23/2012     Priority: Low     Spring and Fall Allergies           Past Medical/Surgical History:  Past Medical History:   Diagnosis Date    Diabetes (H) 2012    Hypertension goal BP (blood pressure) < 140/90      Past Surgical History:   Procedure Laterality Date    COLONOSCOPY N/A 3/14/2025    Procedure: Colonoscopy;  Surgeon: Gonzalo Leblanc MD;  Location:  GI    SURGICAL HISTORY OF -       Groin Cyst removal 2012       Social History:  Social History     Socioeconomic History    Marital status: Single     Spouse name: Not on file    Number of children: Not on file    Years of education: Not on file    Highest education level: Not on file   Occupational History    Not on file   Tobacco Use    Smoking status: Never     Passive exposure: Never    Smokeless tobacco: Never   Vaping Use    Vaping status: Never Used   Substance and  Sexual Activity    Alcohol use: Yes     Comment: occasional    Drug use: No    Sexual activity: Yes     Partners: Female, Male     Birth control/protection: Condom, Implant   Other Topics Concern    Parent/sibling w/ CABG, MI or angioplasty before 65F 55M? No   Social History Narrative    Not on file     Social Drivers of Health     Financial Resource Strain: Low Risk  (3/12/2025)    Financial Resource Strain     Within the past 12 months, have you or your family members you live with been unable to get utilities (heat, electricity) when it was really needed?: No   Food Insecurity: Low Risk  (3/12/2025)    Food Insecurity     Within the past 12 months, did you worry that your food would run out before you got money to buy more?: No     Within the past 12 months, did the food you bought just not last and you didn t have money to get more?: No   Transportation Needs: Low Risk  (3/12/2025)    Transportation Needs     Within the past 12 months, has lack of transportation kept you from medical appointments, getting your medicines, non-medical meetings or appointments, work, or from getting things that you need?: No   Physical Activity: Not on file   Stress: Not on file   Social Connections: Not on file   Interpersonal Safety: Low Risk  (12/11/2024)    Interpersonal Safety     Do you feel physically and emotionally safe where you currently live?: Yes     Within the past 12 months, have you been hit, slapped, kicked or otherwise physically hurt by someone?: No     Within the past 12 months, have you been humiliated or emotionally abused in other ways by your partner or ex-partner?: No   Housing Stability: Low Risk  (3/12/2025)    Housing Stability     Do you have housing? : Yes     Are you worried about losing your housing?: No       Family History:  Family History   Problem Relation Age of Onset    Diabetes Mother     Thyroid Disease Mother     Breast Cancer Mother     Hypertension Father         Has dementia  and  "eduardo    Diabetes Father        Review of Systems:  A complete review of systems reviewed by me is negative with the exeption of what has been mentioned in the history of present illness.  In the last TWO WEEKS have you experienced any of the following symptoms?  Fevers: (Patient-Rptd) No  Night Sweats: (Patient-Rptd) Yes  Weight Gain: (Patient-Rptd) No  Pain at Night: (Patient-Rptd) No  Double Vision: (Patient-Rptd) No  Changes in Vision: (Patient-Rptd) No  Difficulty Breathing through Nose: (Patient-Rptd) No  Sore Throat in Morning: (Patient-Rptd) No  Dry Mouth in the Morning: (Patient-Rptd) Yes  Shortness of Breath Lying Flat: (Patient-Rptd) No  Shortness of Breath With Activity: (Patient-Rptd) No  Awakening with Shortness of Breath: (Patient-Rptd) No  Increased Cough: (Patient-Rptd) No  Heart Racing at Night: (Patient-Rptd) No  Swelling in Feet or Legs: (Patient-Rptd) No  Diarrhea at Night: (Patient-Rptd) Yes  Heartburn at Night: (Patient-Rptd) No  Urinating More than Once at Night: (Patient-Rptd) No  Losing Control of Urine at Night: (Patient-Rptd) No  Joint Pains at Night: (Patient-Rptd) No  Headaches in Morning: (Patient-Rptd) No  Weakness in Arms or Legs: (Patient-Rptd) No  Depressed Mood: (Patient-Rptd) No  Anxiety: (Patient-Rptd) No     Physical Examination:  Vitals: Ht 1.88 m (6' 2\")   Wt 127 kg (280 lb)   BMI 35.95 kg/m    BMI= Body mass index is 35.95 kg/m .           GENERAL APPEARANCE: alert and no distress  EYES: Eyes grossly normal to inspection  NECK: no asymmetry, masses, or scars  RESP: breathing is non-labored   NEURO: mentation intact and speech normal  PSYCH: affect normal/bright  Mallampati Class:   Tonsillar Stage:          Data: All pertinent previous laboratory data reviewed     Recent Labs   Lab Test 03/14/25  0852 12/11/24  0727 02/22/24  0700   NA  --  138 141   POTASSIUM  --  4.4 4.6   CHLORIDE  --  102 106   CO2  --  23 22   ANIONGAP  --  13 13   * 332* 307*   BUN  --  " 14.4 15.0   CR  --  0.85 0.91   KAYLA  --  9.5 9.4       Recent Labs   Lab Test 12/11/24  0727   WBC 8.1   RBC 5.62   HGB 16.0   HCT 47.7   MCV 85   MCH 28.5   MCHC 33.5   RDW 13.1          Recent Labs   Lab Test 12/11/24  0727   PROTTOTAL 7.2   ALBUMIN 4.0   BILITOTAL 0.6   ALKPHOS 122   AST 21   ALT 45       TSH (mU/L)   Date Value   08/06/2013 1.97   12/21/2011 3.36       ALYSSA Birch 3/25/2025

## 2025-03-25 NOTE — NURSING NOTE
Current patient location: [address]  [address]  [facility] [address]    Is the patient currently in the state of [place]? YES    Visit mode: VIDEO    If the visit is dropped, the patient can be reconnected by:VIDEO VISIT: Send to e-mail at: [email or web address]    Will anyone else be joining the visit? NO  (If patient encounters technical issues they should call [phone]    Are changes needed to the allergy or medication list? No    Are refills needed on medications prescribed by this physician? NO    Rooming Documentation:  Questionnaire(s) completed    Reason for visit: Consult    Travis QUAN

## 2025-03-25 NOTE — PATIENT INSTRUCTIONS
Your Body mass index is 35.95 kg/m .  Weight management is a personal decision.  If you are interested in exploring weight loss strategies, the following discussion covers the approaches that may be successful. Body mass index (BMI) is one way to tell whether you are at a healthy weight, overweight, or obese. It measures your weight in relation to your height.  A BMI of 18.5 to 24.9 is in the healthy range. A person with a BMI of 25 to 29.9 is considered overweight, and someone with a BMI of 30 or greater is considered obese. More than two-thirds of American adults are considered overweight or obese.  Being overweight or obese increases the risk for further weight gain. Excess weight may lead to heart disease and diabetes.  Creating and following plans for healthy eating and physical activity may help you improve your health.  Weight control is part of healthy lifestyle and includes exercise, emotional health, and healthy eating habits. Careful eating habits lifelong are the mainstay of weight control. Though there are significant health benefits from weight loss, long-term weight loss with diet alone may be very difficult to achieve- studies show long-term success with dietary management in less than 10% of people. Attaining a healthy weight may be especially difficult to achieve in those with severe obesity. In some cases, medications, devices and surgical management might be considered.  What can you do?  If you are overweight or obese and are interested in methods for weight loss, you should discuss this with your provider.   Consider reducing daily calorie intake by 500 calories.   Keep a food journal.   Avoiding skipping meals, consider cutting portions instead.    Diet combined with exercise helps maintain muscle while optimizing fat loss. Strength training is particularly important for building and maintaining muscle mass. Exercise helps reduce stress, increase energy, and improves fitness. Increasing  exercise without diet control, however, may not burn enough calories to loose weight.     Start walking three days a week 10-20 minutes at a time  Work towards walking thirty minutes five days a week   Eventually, increase the speed of your walking for 1-2 minutes at time    In addition, we recommend that you review healthy lifestyles and methods for weight loss available through the National Institutes of Health patient information sites:  http://win.niddk.nih.gov/publications/index.htm    And look into health and wellness programs that may be available through your health insurance provider, employer, local community center, or hailey club.

## 2025-04-03 ENCOUNTER — MYC MEDICAL ADVICE (OUTPATIENT)
Dept: FAMILY MEDICINE | Facility: CLINIC | Age: 45
End: 2025-04-03
Payer: COMMERCIAL

## 2025-04-03 NOTE — TELEPHONE ENCOUNTER
Patient Quality Outreach    Patient is due for the following:   Diabetes -  A1C  Hypertension -  Hypertension follow-up visit  Physical Preventive Adult Physical    Action(s) Taken:   Schedule a Adult Preventative    Type of outreach:    Sent [employer] message.-- Patient may have changed provider/clinic ?    Questions for provider review:    None         Zakiya Machuca CMA  Chart routed to None.

## 2025-05-17 DIAGNOSIS — Z79.4 TYPE 2 DIABETES MELLITUS WITH HYPERGLYCEMIA, WITH LONG-TERM CURRENT USE OF INSULIN (H): ICD-10-CM

## 2025-05-17 DIAGNOSIS — E11.65 TYPE 2 DIABETES MELLITUS WITH HYPERGLYCEMIA, WITH LONG-TERM CURRENT USE OF INSULIN (H): ICD-10-CM

## 2025-05-19 RX ORDER — LIRAGLUTIDE 6 MG/ML
1.8 INJECTION SUBCUTANEOUS DAILY
Qty: 9 ML | Refills: 2 | Status: SHIPPED | OUTPATIENT
Start: 2025-05-19

## 2025-06-08 DIAGNOSIS — I10 HYPERTENSION GOAL BP (BLOOD PRESSURE) < 140/90: ICD-10-CM

## 2025-06-09 RX ORDER — AMLODIPINE BESYLATE 10 MG/1
10 TABLET ORAL DAILY
Qty: 90 TABLET | Refills: 1 | Status: SHIPPED | OUTPATIENT
Start: 2025-06-09

## 2025-07-23 ENCOUNTER — PATIENT OUTREACH (OUTPATIENT)
Dept: FAMILY MEDICINE | Facility: CLINIC | Age: 45
End: 2025-07-23
Payer: COMMERCIAL

## 2025-07-23 NOTE — TELEPHONE ENCOUNTER
Patient Quality Outreach    Patient is due for the following:   Physical Preventive Adult Physical      Topic Date Due    Hepatitis A Vaccine (1 of 2 - Risk 2-dose series) Never done       Action(s) Taken:   Schedule a Adult Preventative    Type of outreach:    Sent Headplay message.    Questions for provider review:    None         Raeann Mccallum MA  Chart routed to None.

## (undated) RX ORDER — FENTANYL CITRATE 50 UG/ML
INJECTION, SOLUTION INTRAMUSCULAR; INTRAVENOUS
Status: DISPENSED
Start: 2025-03-14